# Patient Record
Sex: FEMALE | Race: WHITE | Employment: OTHER | URBAN - METROPOLITAN AREA
[De-identification: names, ages, dates, MRNs, and addresses within clinical notes are randomized per-mention and may not be internally consistent; named-entity substitution may affect disease eponyms.]

---

## 2017-01-12 RX ORDER — SIMVASTATIN 20 MG/1
20 TABLET, FILM COATED ORAL
Qty: 90 TAB | Refills: 0 | Status: SHIPPED | OUTPATIENT
Start: 2017-01-12 | End: 2017-04-09 | Stop reason: SDUPTHER

## 2017-01-12 NOTE — TELEPHONE ENCOUNTER
From: Pat Echeverria  To: Maria Isabel Murray MD  Sent: 1/11/2017 7:56 PM EST  Subject: Medication Renewal Request    Original authorizing provider: MD Pat Cunningham would like a refill of the following medications:  simvastatin (ZOCOR) 20 mg tablet Maria Isabel Murray MD]    Preferred pharmacy: [facility]/PHARMACY #[id number] [place] [address] [address]:

## 2017-01-25 DIAGNOSIS — E11.21 TYPE II DIABETES MELLITUS WITH NEPHROPATHY (HCC): ICD-10-CM

## 2017-01-26 RX ORDER — LEVOTHYROXINE SODIUM 25 UG/1
TABLET ORAL
Qty: 90 TAB | Refills: 0 | Status: SHIPPED | OUTPATIENT
Start: 2017-01-26 | End: 2017-05-14 | Stop reason: SDUPTHER

## 2017-01-26 RX ORDER — LOSARTAN POTASSIUM 25 MG/1
TABLET ORAL
Qty: 90 TAB | Refills: 0 | Status: SHIPPED | OUTPATIENT
Start: 2017-01-26 | End: 2017-05-01 | Stop reason: SDUPTHER

## 2017-03-02 RX ORDER — LOSARTAN POTASSIUM AND HYDROCHLOROTHIAZIDE 12.5; 5 MG/1; MG/1
1 TABLET ORAL 2 TIMES DAILY
Qty: 180 TAB | Refills: 0 | Status: SHIPPED | OUTPATIENT
Start: 2017-03-02 | End: 2017-05-29 | Stop reason: SDUPTHER

## 2017-03-02 NOTE — TELEPHONE ENCOUNTER
From: Divina Canales  To: Zeferino Chowdhury MD  Sent: 3/2/2017 8:33 AM EST  Subject: Medication Renewal Request    Original authorizing provider: MD Divina Martinez would like a refill of the following medications:  losartan-hydrochlorothiazide (HYZAAR) 50-12.5 mg per tablet Zeferino Chowdhury MD]    Preferred pharmacy: [facility]/PHARMACY #[id number]:

## 2017-03-22 RX ORDER — SERTRALINE HYDROCHLORIDE 100 MG/1
TABLET, FILM COATED ORAL
Qty: 90 TAB | Refills: 3 | Status: SHIPPED | OUTPATIENT
Start: 2017-03-22 | End: 2018-03-18 | Stop reason: SDUPTHER

## 2017-04-06 ENCOUNTER — HOSPITAL ENCOUNTER (OUTPATIENT)
Dept: LAB | Age: 72
Discharge: HOME | End: 2017-04-06
Payer: MEDICARE

## 2017-04-06 ENCOUNTER — OFFICE VISIT (OUTPATIENT)
Dept: INTERNAL MEDICINE CLINIC | Age: 72
End: 2017-04-06

## 2017-04-06 VITALS
DIASTOLIC BLOOD PRESSURE: 78 MMHG | HEIGHT: 62 IN | HEART RATE: 67 BPM | OXYGEN SATURATION: 96 % | WEIGHT: 205 LBS | TEMPERATURE: 97.8 F | BODY MASS INDEX: 37.73 KG/M2 | RESPIRATION RATE: 20 BRPM | SYSTOLIC BLOOD PRESSURE: 119 MMHG

## 2017-04-06 DIAGNOSIS — Z79.4 TYPE 2 DIABETES MELLITUS WITHOUT COMPLICATION, WITH LONG-TERM CURRENT USE OF INSULIN (HCC): ICD-10-CM

## 2017-04-06 DIAGNOSIS — I10 ESSENTIAL HYPERTENSION: Primary | ICD-10-CM

## 2017-04-06 DIAGNOSIS — I25.10 CORONARY ARTERY DISEASE DUE TO CALCIFIED CORONARY LESION: ICD-10-CM

## 2017-04-06 DIAGNOSIS — D72.825 BANDEMIA: ICD-10-CM

## 2017-04-06 DIAGNOSIS — E11.9 TYPE 2 DIABETES MELLITUS WITHOUT COMPLICATION, WITH LONG-TERM CURRENT USE OF INSULIN (HCC): ICD-10-CM

## 2017-04-06 DIAGNOSIS — E55.9 VITAMIN D DEFICIENCY: ICD-10-CM

## 2017-04-06 DIAGNOSIS — Z11.59 NEED FOR HEPATITIS C SCREENING TEST: ICD-10-CM

## 2017-04-06 DIAGNOSIS — I25.84 CORONARY ARTERY DISEASE DUE TO CALCIFIED CORONARY LESION: ICD-10-CM

## 2017-04-06 DIAGNOSIS — R60.0 LOCALIZED EDEMA: ICD-10-CM

## 2017-04-06 DIAGNOSIS — E03.4 HYPOTHYROIDISM DUE TO ACQUIRED ATROPHY OF THYROID: ICD-10-CM

## 2017-04-06 PROCEDURE — 80053 COMPREHEN METABOLIC PANEL: CPT

## 2017-04-06 PROCEDURE — 86803 HEPATITIS C AB TEST: CPT

## 2017-04-06 PROCEDURE — 84443 ASSAY THYROID STIM HORMONE: CPT

## 2017-04-06 PROCEDURE — 82043 UR ALBUMIN QUANTITATIVE: CPT

## 2017-04-06 PROCEDURE — 82306 VITAMIN D 25 HYDROXY: CPT

## 2017-04-06 PROCEDURE — 80061 LIPID PANEL: CPT

## 2017-04-06 PROCEDURE — 36415 COLL VENOUS BLD VENIPUNCTURE: CPT

## 2017-04-06 PROCEDURE — 83735 ASSAY OF MAGNESIUM: CPT

## 2017-04-06 NOTE — PROGRESS NOTES
Matthias Springer is a 70 y.o. female and presents with Leg Swelling and Labs  . Edema  Patient complains of edema. The location of the edema is lower leg(s) bilateral.  The edema has been mild. Onset of symptoms was 1 day ago, unchanged since that time. The edema is present on awakening. The patient states the problem is intermittent for 3 months. The swelling has been aggravated by dependency of involved area, hot weather, relieved by diuretics, and been associated with nothing. Cardiac risk factors include dyslipidemia, diabetes mellitus, obesity, sedentary life style, hypertension, post-menopausal.    cad  She was seen by Dr. Marc hamilton. An ecg was done as well as peripheral dopplers. Both were negative for cv findings. She will follow up in 3 months. Diabetes  BS have been increasing  Saw Dr. Beverly & Arnold who is managing care  a1c is stable 7.3 but no lows so will continue current care  No se of meds    Subjective:   Matthias Springer is a 70 y.o. female with hypertension. Hypertension ROS: taking medications as instructed, no medication side effects noted, no TIA's, no chest pain on exertion, no dyspnea on exertion, no swelling of ankles. New concerns:  but did not wear compression hose today    Sleep apnea  Saw Dr. Maria Elena Santo, pt is on CPAP machine  She had pressure changed but now has settled into new change  Sleeping 6-7 hours/night now     SUBJECTIVE: Matthias Springer is a 70 y.o. female here for follow up of hypothyroidism. Lab Results   Component Value Date/Time    TSH 2.290 04/27/2016 09:25 AM     Thyroid ROS: denies fatigue, weight changes, heat/cold intolerance, bowel/skin changes or CVS symptoms. Nephropathy reviewed with pt re: labs  Review of Systems  Constitutional: negative for fevers, chills, anorexia and weight loss  Eyes:   negative for visual disturbance and irritation  ENT:   negative for tinnitus,sore throat,nasal congestion,ear pains. hoarseness  Respiratory:  negative for cough, hemoptysis, dyspnea,wheezing  CV:   negative for chest pain, palpitations, lower extremity edema  GI:   negative for nausea, vomiting, diarrhea, abdominal pain,melena  Endo:               negative for polyuria,polydipsia,polyphagia,heat intolerance  Genitourinary: negative for frequency, dysuria and hematuria  Integument:  negative for rash and pruritus  Hematologic:  negative for easy bruising and gum/nose bleeding  Musculoskel: negative for myalgias, arthralgias, back pain, muscle weakness, joint pain  Neurological:  negative for headaches, dizziness, vertigo, memory problems and gait   Behavl/Psych: negative for feelings of anxiety, depression, mood changes    Past Medical History:   Diagnosis Date    Arthritis     CAD (coronary artery disease) 2006    STENT PLACED>PLAQUE BROKE OFF--HAD \"MINOR MI\"    Deviated septum 13    HAS TROUBLE BREATHING THROUGH LEFT SIDE; CT SHOWED POLYP    Diabetes ([facility] [address]     Heart disease     Hypertension     Incontinence     Nasal polyp     Unspecified sleep apnea 13    CAN'T TOLERATE CPAP     Past Surgical History:   Procedure Laterality Date    CARDIAC SURG PROCEDURE UNLIST      CARDIAC CATH;ANGIOPLASTY WITH STENT    HX  SECTION      X3    HX COLONOSCOPY  2011    HX HEENT  ?     SEPTOPLASTY     Social History     Social History    Marital status:      Spouse name: N/A    Number of children: N/A    Years of education: N/A     Social History Main Topics    Smoking status: Former Smoker     Packs/day: 1.00     Years: 5.00     Quit date: 1970    Smokeless tobacco: None    Alcohol use Yes      Comment: \"VERY LITTLE ALCOHOL\"    Drug use: No    Sexual activity: Not Asked     Other Topics Concern    None     Social History Narrative            3 children women 36, 40, 37 healthy other than skin issues     Family History   Problem Relation Age of Onset    Heart Disease Mother     Heart Surgery Mother      VALVE REPLACED AT AGE 72    Heart Attack Father 46     MASSIVE     Anesth Problems Neg Hx      Current Outpatient Prescriptions   Medication Sig Dispense Refill    sertraline (ZOLOFT) 100 mg tablet TAKE 1 TAB BY MOUTH EVERY EVENING. 90 Tab 3    losartan-hydroCHLOROthiazide (HYZAAR) 50-12.5 mg per tablet Take 1 Tab by mouth two (2) times a day. 180 Tab 0    losartan (COZAAR) 25 mg tablet TAKE 1 TABLET BY MOUTH EVERY DAY 90 Tab 0    levothyroxine (SYNTHROID) 25 mcg tablet TAKE 1 TABLET BY MOUTH EVERY DAY BEFORE BREAKFAST 90 Tab 0    simvastatin (ZOCOR) 20 mg tablet Take 1 Tab by mouth nightly. 90 Tab 0    metoprolol tartrate (LOPRESSOR) 50 mg tablet Take 1 Tab by mouth daily. 90 Tab 1    Blood Sugar Diagnostic, Drum (ACCU-CHEK COMPACT PLUS TEST) strp Use to test blood sugar 4 times daily. DX E11.9 360 Strip 6    insulin glargine (LANTUS) 100 unit/mL injection 20 Units by SubCUTAneous route two (2) times a day. 1 Vial 3    HUMALOG KWIKPEN 100 unit/mL kwikpen   3    Cholecalciferol, Vitamin D3, (VITAMIN D3) 1,000 unit cap Take 1 Cap by mouth nightly. 30 Cap 3    glimepiride (AMARYL) 4 mg tablet Take  by mouth every morning.  Aspirin, Buffered 81 mg tab Take  by mouth daily.  metFORMIN (GLUCOPHAGE) 1,000 mg tablet Take 1,000 mg by mouth two (2) times a day.  omega-3 acid ethyl esters (LOVAZA) 1 gram capsule Take 2 g by mouth two (2) times a day.  Cetirizine (ZYRTEC) 10 mg cap Take  by mouth daily.  mometasone (NASONEX) 50 mcg/actuation nasal spray 2 Sprays by Both Nostrils route daily.        Allergies   Allergen Reactions    Adhesive Rash and Itching    Advil [Ibuprofen] Swelling     FEET SWELL    Ceftin [Cefuroxime Axetil] Itching    Lotensin [Benazepril] Cough    Penicillins Swelling    Sulfa (Sulfonamide Antibiotics) Rash, Itching and Swelling       Objective:  Visit Vitals    /78 (BP 1 Location: Left arm, BP Patient Position: Sitting)    Pulse 67    Temp 97.8 °F (36.6 °C) (Oral)    Resp 20    Ht 5' 2\" (1.575 m)    Wt 205 lb (93 kg)    SpO2 96%    BMI 37.49 kg/m2     Physical Exam:   General appearance - alert, well appearing, and in no distress  Mental status - alert, oriented to person, place, and time  EYE-SHARAD, EOMI, corneas normal, no foreign bodies, visual acuity normal both eyes, no periorbital cellulitis  ENT-ENT exam normal, no neck nodes or sinus tenderness  Nose - normal and patent, no erythema, discharge or polyps  Mouth - mucous membranes moist, pharynx normal without lesions  Neck - supple, no significant adenopathy   Chest - clear to auscultation, no wheezes, rales or rhonchi, symmetric air entry   Heart - normal rate, regular rhythm, normal S1, S2, no murmurs, rubs, clicks or gallops   Abdomen - soft, nontender, nondistended, no masses or organomegaly  Lymph- no adenopathy palpable  Ext-peripheral pulses normal, no pedal edema, no clubbing or cyanosis, + 1 edema  Skin-Warm and dry. no hyperpigmentation, vitiligo, or suspicious lesions, scattered resolved superifical skin bites, no erythema  Neuro -alert, oriented, normal speech, no focal findings or movement disorder noted  Neck-normal C-spine, no tenderness, full ROM without pain      Results for orders placed or performed in visit on 07/27/16   CBC WITH AUTOMATED DIFF   Result Value Ref Range    WBC 11.7 (H) 3.4 - 10.8 x10E3/uL    RBC 4.33 3.77 - 5.28 x10E6/uL    HGB 13.2 11.1 - 15.9 g/dL    HCT 38.7 34.0 - 46.6 %    MCV 89 79 - 97 fL    MCH 30.5 26.6 - 33.0 pg    MCHC 34.1 31.5 - 35.7 g/dL    RDW 14.0 12.3 - 15.4 %    PLATELET 548 978 - 569 x10E3/uL    NEUTROPHILS 63 %    Lymphocytes 26 %    MONOCYTES 6 %    EOSINOPHILS 4 %    BASOPHILS 1 %    ABS. NEUTROPHILS 7.3 (H) 1.4 - 7.0 x10E3/uL    Abs Lymphocytes 3.0 0.7 - 3.1 x10E3/uL    ABS. MONOCYTES 0.7 0.1 - 0.9 x10E3/uL    ABS. EOSINOPHILS 0.5 (H) 0.0 - 0.4 x10E3/uL    ABS. BASOPHILS 0.1 0.0 - 0.2 x10E3/uL    IMMATURE GRANULOCYTES 0 %    ABS. IMM.  GRANS. 0.0 0.0 - 0.1 [id number]/FA   METABOLIC PANEL, COMPREHENSIVE   Result Value Ref Range    Glucose 114 (H) 65 - 99 mg/dL    BUN 20 8 - 27 mg/dL    Creatinine 0.83 0.57 - 1.00 mg/dL    GFR est non-AA 71 >59 mL/min/1.73    GFR est AA 82 >59 mL/min/1.73    BUN/Creatinine ratio 24 11 - 26    Sodium 140 134 - 144 mmol/L    Potassium 4.4 3.5 - 5.2 mmol/L    Chloride 100 97 - 108 mmol/L    CO2 20 18 - 29 mmol/L    Calcium 9.7 8.7 - 10.3 mg/dL    Protein, total 6.8 6.0 - 8.5 g/dL    Albumin 4.4 3.5 - 4.8 g/dL    GLOBULIN, TOTAL 2.4 1.5 - 4.5 g/dL    A-G Ratio 1.8 1.1 - 2.5    Bilirubin, total 0.4 0.0 - 1.2 mg/dL    Alk. phosphatase 96 39 - 117 IU/L    AST (SGOT) 23 0 - 40 IU/L    ALT (SGPT) 27 0 - 32 IU/L   MICROALBUMIN, UR, RAND W/ MICROALBUMIN/CREA RATIO   Result Value Ref Range    Creatinine, urine 81.9 Not Estab. mg/dL    Microalbumin, urine 91.2 Not Estab. ug/mL    Microalb/Creat ratio (ug/mg creat.) 111.4 (H) 0.0 - 30.0 mg/g creat     Prevention    Cardiovascular profile  Family hx  Exercising:  Water aerobics at [employer]  Blood pressure:  Health healthy diet:  Diabetes:  Cholesterol:  Renal function:      Cancer risk profile  Mammogram ordered at [address] [place] [address]  Colonoscopy 2013 at [facility], was told did not need repeat unless sx  Skin nonhealing in 2 weeks  Gyn abnormal bleeding/discharge/abd pain/pressure no issues, no symptoms      Thyroid sx  Fatigue, sleep apnea    Osteopenia prevention  Calcium 1000mg/day yes  Vitamin D 800iu/day yes    Mental health scale: 8/10  zoloft correct dose  Depression  Anxiety  Sleep # of hours:  Energy Level:        Immunizations  TDAP  Pneumonia vaccine  Flu vaccine  Shingles vaccine  HPV    Manuel Flatness was seen today for leg swelling and labs.     Diagnoses and all orders for this visit:    Need for hepatitis C screening test  -     HEPATITIS C AB    Type 2 diabetes mellitus without complication, with long-term current use of insulin (HCC)  Cont labs  -     LIPID PANEL  -     MICROALBUMIN, UR, RAND W/ MICROALBUMIN/CREA RATIO    Essential hypertension  Stable  Added hctz last visit due to edema, check labs  -     LIPID PANEL  -     MICROALBUMIN, UR, RAND W/ MICROALBUMIN/CREA RATIO  -     MAGNESIUM    Localized edema  stable  -     MAGNESIUM  -     AMB SUPPLY ORDER    Coronary artery disease due to calcified coronary lesion  -     METABOLIC PANEL, COMPREHENSIVE    Hypothyroidism due to acquired atrophy of thyroid  -     TSH 3RD GENERATION    Bandemia  Follow up with hematology    Vitamin D deficiency  -     VITAMIN D, 25 HYDROXY        Edema  Use compression moderate strength (new Pair)  ini emely increase hydrochlorothiazide, labs today mag and losartan cont    Diabetes  Leo follow up  Foot exam done today  Opthalmology dr. Bryce Baker cataract 12/216        This note will not be viewable in MyChart.

## 2017-04-06 NOTE — MR AVS SNAPSHOT
Visit Information Date & Time Provider Department Dept. Phone Encounter #  
 4/6/2017  8:15 AM Anna Marie Garcia MD [facility] [address] [id number] Your Appointments 7/17/2017  9:00 AM  
Any with Sharyn Smalls MD  
[address] [facility] ([facility]) Appt Note: Yearly F/U, bring machine. [address] [address] [facility]  
  
   
 [address] [address] Upcoming Health Maintenance Date Due Hepatitis C Screening 1945 FOOT EXAM Q1 6/20/1955 EYE EXAM RETINAL OR DILATED Q1 6/20/1955 BREAST CANCER SCRN MAMMOGRAM 6/20/1995 FOBT Q 1 YEAR AGE 50-75 6/20/1995 GLAUCOMA SCREENING Q2Y 6/20/2010 OSTEOPOROSIS SCREENING (DEXA) 6/20/2010 MEDICARE YEARLY EXAM 6/20/2010 Pneumococcal 65+ High/Highest Risk (2 of 2 - PCV13) 11/30/2011 INFLUENZA AGE 9 TO ADULT 8/1/2016 HEMOGLOBIN A1C Q6M 10/27/2016 MICROALBUMIN Q1 11/4/2017 LIPID PANEL Q1 11/4/2017 DTaP/Tdap/Td series (2 - Td) 1/15/2025 Allergies as of 4/6/2017  Review Complete On: 4/6/2017 By: Anna Marie Garcia MD  
  
 Severity Noted Reaction Type Reactions Adhesive  12/20/2013    Rash, Itching Advil [Ibuprofen]  12/20/2013    Swelling FEET SWELL Ceftin [Cefuroxime Axetil]  12/20/2013    Itching Lotensin [Benazepril]  12/20/2013    Cough Penicillins  12/20/2013    Swelling Sulfa (Sulfonamide Antibiotics)  12/20/2013    Rash, Itching, Swelling Current Immunizations  Never Reviewed Name Date Influenza High Dose Vaccine PF 9/22/2015 Pneumococcal Polysaccharide (PPSV-23) 11/30/2010 Tdap 1/15/2015 Zoster Vaccine, Live 7/27/2011 Not reviewed this visit You Were Diagnosed With   
  
 Codes Comments  Need for hepatitis C screening test    -  Primary ICD-10-CM: Z11.59 
ICD-9-CM: V73.89   
 Type 2 diabetes mellitus without complication, with long-term current use of insulin (HCC)     ICD-10-CM: E11.9, Z79.4 ICD-9-CM: 250.00, V58.67 Essential hypertension     ICD-10-CM: I10 
ICD-9-CM: 401.9 Localized edema     ICD-10-CM: R60.0 ICD-9-CM: 458. 3 Coronary artery disease due to calcified coronary lesion     ICD-10-CM: I25.10, I25.84 ICD-9-CM: 414.00, 414.4 Hypothyroidism due to acquired atrophy of thyroid     ICD-10-CM: E03.4 ICD-9-CM: 244.8, 246.8 Bandemia     ICD-10-CM: O37.479 ICD-9-CM: 420.33 Vitamin D deficiency     ICD-10-CM: E55.9 ICD-9-CM: 268.9 Vitals BP Pulse Temp Resp Height(growth percentile) Weight(growth percentile) 119/78 (BP 1 Location: Left arm, BP Patient Position: Sitting) 67 97.8 °F (36.6 °C) (Oral) 20 5' 2\" (1.575 m) 205 lb (93 kg) SpO2 BMI OB Status Smoking Status 96% 37.49 kg/m2 Postmenopausal Former Smoker Vitals History BMI and BSA Data Body Mass Index Body Surface Area  
 37.49 kg/m 2 2.02 m 2 Preferred Pharmacy Pharmacy Name Phone [facility]/PHARMACY #[id number]- [place], [address] [facility] of [phone] Your Updated Medication List  
  
   
This list is accurate as of: 4/6/17  9:04 AM.  Always use your most recent med list.  
  
  
  
  
 aspirin, buffered 81 mg Tab Take  by mouth daily. Blood Sugar Diagnostic, Drum Strp Commonly known as:  ACCU-CHEK COMPACT PLUS TEST Use to test blood sugar 4 times daily. DX E11.9 cholecalciferol 1,000 unit Cap Commonly known as:  VITAMIN D3 Take 1 Cap by mouth nightly. glimepiride 4 mg tablet Commonly known as:  AMARYL Take  by mouth every morning. HumaLOG KwikPen 100 unit/mL kwikpen Generic drug:  insulin lispro  
  
 insulin glargine 100 unit/mL injection Commonly known as:  LANTUS  
20 Units by SubCUTAneous route two (2) times a day. levothyroxine 25 mcg tablet Commonly known as:  SYNTHROID  
TAKE 1 TABLET BY MOUTH EVERY DAY BEFORE BREAKFAST  
  
 losartan 25 mg tablet Commonly known as:  COZAAR  
TAKE 1 TABLET BY MOUTH EVERY DAY  
  
 losartan-hydroCHLOROthiazide 50-12.5 mg per tablet Commonly known as:  HYZAAR Take 1 Tab by mouth two (2) times a day. LOVAZA 1 gram capsule Generic drug:  omega-3 acid ethyl esters Take 2 g by mouth two (2) times a day. metFORMIN 1,000 mg tablet Commonly known as:  GLUCOPHAGE Take 1,000 mg by mouth two (2) times a day. metoprolol tartrate 50 mg tablet Commonly known as:  LOPRESSOR Take 1 Tab by mouth daily. NASONEX 50 mcg/actuation nasal spray Generic drug:  mometasone 2 Sprays by Both Nostrils route daily. sertraline 100 mg tablet Commonly known as:  ZOLOFT  
TAKE 1 TAB BY MOUTH EVERY EVENING. simvastatin 20 mg tablet Commonly known as:  ZOCOR Take 1 Tab by mouth nightly. ZyrTEC 10 mg Cap Generic drug:  Cetirizine Take  by mouth daily. We Performed the Following AMB SUPPLY ORDER [[id number] Custom] Comments: Moderate strength 15-20 mmHg knee highs. Please show pt how to use compression hose sock nathen. HEPATITIS C AB [33890 CPT(R)] LIPID PANEL [28369 CPT(R)] MAGNESIUM [id number] CPT(R)] METABOLIC PANEL, COMPREHENSIVE [39418 CPT(R)] MICROALBUMIN, UR, RAND W/ MICROALBUMIN/CREA RATIO [id number] CPT(R)] TSH 3RD GENERATION [08228 CPT(R)] VITAMIN D, 25 HYDROXY [id number] CPT(R)] Introducing [facility]! Dear Lucila Ludwig: Thank you for requesting a [employer] account. Our records indicate that you already have an active [employer] account. You can access your account anytime at [email or web address] Did you know that you can access your hospital and ER discharge instructions at any time in [employer]? You can also review all of your test results from your hospital stay or ER visit. Additional Information If you have questions, please visit the Frequently Asked Questions section of the [employer] website at [email or web address] Remember, [employer] is NOT to be used for urgent needs. For medical emergencies, dial 911. Now available from your iPhone and Android! Please provide this summary of care documentation to your next provider. Your primary care clinician is listed as Jennifer Norris. If you have any questions after today's visit, please call [phone].

## 2017-04-07 LAB
25(OH)D3+25(OH)D2 SERPL-MCNC: 41.1 NG/ML (ref 30–100)
ALBUMIN SERPL-MCNC: 4.4 G/DL (ref 3.5–4.8)
ALBUMIN/CREAT UR: 161.6 MG/G CREAT (ref 0–30)
ALBUMIN/GLOB SERPL: 1.8 {RATIO} (ref 1.2–2.2)
ALP SERPL-CCNC: 92 IU/L (ref 39–117)
ALT SERPL-CCNC: 27 IU/L (ref 0–32)
AST SERPL-CCNC: 24 IU/L (ref 0–40)
BILIRUB SERPL-MCNC: 0.4 MG/DL (ref 0–1.2)
BUN SERPL-MCNC: 23 MG/DL (ref 8–27)
BUN/CREAT SERPL: 29 (ref 12–28)
CALCIUM SERPL-MCNC: 9.5 MG/DL (ref 8.7–10.3)
CHLORIDE SERPL-SCNC: 99 MMOL/L (ref 96–106)
CHOLEST SERPL-MCNC: 161 MG/DL (ref 100–199)
CO2 SERPL-SCNC: 20 MMOL/L (ref 18–29)
CREAT SERPL-MCNC: 0.79 MG/DL (ref 0.57–1)
CREAT UR-MCNC: 72.2 MG/DL
GLOBULIN SER CALC-MCNC: 2.5 G/DL (ref 1.5–4.5)
GLUCOSE SERPL-MCNC: 105 MG/DL (ref 65–99)
HCV AB S/CO SERPL IA: <0.1 S/CO RATIO (ref 0–0.9)
HDLC SERPL-MCNC: 59 MG/DL
INTERPRETATION, 910389: NORMAL
LDLC SERPL CALC-MCNC: 60 MG/DL (ref 0–99)
Lab: NORMAL
MAGNESIUM SERPL-MCNC: 1.3 MG/DL (ref 1.6–2.3)
MICROALBUMIN UR-MCNC: 116.7 UG/ML
POTASSIUM SERPL-SCNC: 4.1 MMOL/L (ref 3.5–5.2)
PROT SERPL-MCNC: 6.9 G/DL (ref 6–8.5)
SODIUM SERPL-SCNC: 139 MMOL/L (ref 134–144)
TRIGL SERPL-MCNC: 211 MG/DL (ref 0–149)
TSH SERPL DL<=0.005 MIU/L-ACNC: 3.72 UIU/ML (ref 0.45–4.5)
VLDLC SERPL CALC-MCNC: 42 MG/DL (ref 5–40)

## 2017-04-08 RX ORDER — LANOLIN ALCOHOL/MO/W.PET/CERES
CREAM (GRAM) TOPICAL
Qty: 40 TAB | Refills: 0 | Status: SHIPPED | OUTPATIENT
Start: 2017-04-08 | End: 2017-05-01 | Stop reason: SDUPTHER

## 2017-04-10 RX ORDER — SIMVASTATIN 20 MG/1
20 TABLET, FILM COATED ORAL
Qty: 90 TAB | Refills: 1 | Status: SHIPPED | OUTPATIENT
Start: 2017-04-10 | End: 2017-10-17 | Stop reason: SDUPTHER

## 2017-04-10 NOTE — TELEPHONE ENCOUNTER
From: Shani Lange  To: Eliezer Ch MD  Sent: 4/9/2017 4:34 PM EDT  Subject: Medication Renewal Request    Original authorizing provider: MD Shani Betancourt would like a refill of the following medications:  simvastatin (ZOCOR) 20 mg tablet Eliezer Ch MD]    Preferred pharmacy: [facility]/PHARMACY #[id number], [facility] [address]:  Please refill my prescription for Simvastatin 20 mg. tablet at [facility] .  Phone @ [phone] Thank you Navi Garrido

## 2017-05-01 DIAGNOSIS — E11.21 TYPE II DIABETES MELLITUS WITH NEPHROPATHY (HCC): ICD-10-CM

## 2017-05-02 RX ORDER — LANOLIN ALCOHOL/MO/W.PET/CERES
CREAM (GRAM) TOPICAL
Qty: 40 TAB | Refills: 0 | Status: SHIPPED | OUTPATIENT
Start: 2017-05-02 | End: 2017-05-31 | Stop reason: SDUPTHER

## 2017-05-02 RX ORDER — LOSARTAN POTASSIUM 25 MG/1
TABLET ORAL
Qty: 90 TAB | Refills: 0 | Status: SHIPPED | OUTPATIENT
Start: 2017-05-02 | End: 2017-08-07 | Stop reason: SDUPTHER

## 2017-05-09 DIAGNOSIS — E11.21 TYPE II DIABETES MELLITUS WITH NEPHROPATHY (HCC): ICD-10-CM

## 2017-05-10 RX ORDER — METOPROLOL TARTRATE 50 MG/1
TABLET ORAL
Qty: 90 TAB | Refills: 1 | Status: SHIPPED | OUTPATIENT
Start: 2017-05-10 | End: 2017-11-10 | Stop reason: SDUPTHER

## 2017-05-10 RX ORDER — LOSARTAN POTASSIUM 25 MG/1
TABLET ORAL
Qty: 90 TAB | Refills: 0 | Status: SHIPPED | OUTPATIENT
Start: 2017-05-10 | End: 2017-06-23 | Stop reason: SDUPTHER

## 2017-05-16 RX ORDER — LEVOTHYROXINE SODIUM 25 UG/1
TABLET ORAL
Qty: 90 TAB | Refills: 0 | Status: SHIPPED | OUTPATIENT
Start: 2017-05-16 | End: 2017-08-10 | Stop reason: SDUPTHER

## 2017-05-30 RX ORDER — LOSARTAN POTASSIUM AND HYDROCHLOROTHIAZIDE 12.5; 5 MG/1; MG/1
TABLET ORAL
Qty: 180 TAB | Refills: 0 | Status: SHIPPED | OUTPATIENT
Start: 2017-05-30 | End: 2017-08-24 | Stop reason: SDUPTHER

## 2017-06-01 RX ORDER — LANOLIN ALCOHOL/MO/W.PET/CERES
400 CREAM (GRAM) TOPICAL DAILY
Qty: 30 TAB | Refills: 0 | Status: SHIPPED | OUTPATIENT
Start: 2017-06-01 | End: 2017-07-03 | Stop reason: SDUPTHER

## 2017-06-18 ENCOUNTER — HOSPITAL ENCOUNTER (EMERGENCY)
Age: 72
Discharge: HOME | End: 2017-06-18
Attending: STUDENT IN AN ORGANIZED HEALTH CARE EDUCATION/TRAINING PROGRAM
Payer: MEDICARE

## 2017-06-18 ENCOUNTER — APPOINTMENT (OUTPATIENT)
Dept: GENERAL RADIOLOGY | Age: 72
End: 2017-06-18
Attending: EMERGENCY MEDICINE
Payer: MEDICARE

## 2017-06-18 VITALS
WEIGHT: 198.6 LBS | OXYGEN SATURATION: 91 % | RESPIRATION RATE: 17 BRPM | SYSTOLIC BLOOD PRESSURE: 166 MMHG | TEMPERATURE: 99.1 F | DIASTOLIC BLOOD PRESSURE: 83 MMHG | HEIGHT: 62 IN | BODY MASS INDEX: 36.55 KG/M2 | HEART RATE: 70 BPM

## 2017-06-18 DIAGNOSIS — R11.2 NAUSEA AND VOMITING, INTRACTABILITY OF VOMITING NOT SPECIFIED, UNSPECIFIED VOMITING TYPE: Primary | ICD-10-CM

## 2017-06-18 DIAGNOSIS — R19.7 DIARRHEA, UNSPECIFIED TYPE: ICD-10-CM

## 2017-06-18 DIAGNOSIS — E86.0 DEHYDRATION: ICD-10-CM

## 2017-06-18 LAB
ALBUMIN SERPL BCP-MCNC: 3.9 G/DL (ref 3.5–5)
ALBUMIN/GLOB SERPL: 1 {RATIO} (ref 1.1–2.2)
ALP SERPL-CCNC: 93 U/L (ref 45–117)
ALT SERPL-CCNC: 40 U/L (ref 12–78)
ANION GAP BLD CALC-SCNC: 10 MMOL/L (ref 5–15)
APPEARANCE UR: CLEAR
AST SERPL W P-5'-P-CCNC: 27 U/L (ref 15–37)
BACTERIA URNS QL MICRO: NEGATIVE /HPF
BASOPHILS # BLD AUTO: 0 K/UL (ref 0–0.1)
BASOPHILS # BLD: 0 % (ref 0–1)
BILIRUB SERPL-MCNC: 0.6 MG/DL (ref 0.2–1)
BILIRUB UR QL: NEGATIVE
BUN SERPL-MCNC: 22 MG/DL (ref 6–20)
BUN/CREAT SERPL: 20 (ref 12–20)
CALCIUM SERPL-MCNC: 9.3 MG/DL (ref 8.5–10.1)
CHLORIDE SERPL-SCNC: 103 MMOL/L (ref 97–108)
CO2 SERPL-SCNC: 25 MMOL/L (ref 21–32)
COLOR UR: ABNORMAL
CREAT SERPL-MCNC: 1.1 MG/DL (ref 0.55–1.02)
DIFFERENTIAL METHOD BLD: NORMAL
EOSINOPHIL # BLD: 0 K/UL (ref 0–0.4)
EOSINOPHIL NFR BLD: 0 % (ref 0–7)
EPITH CASTS URNS QL MICRO: ABNORMAL /LPF
ERYTHROCYTE [DISTWIDTH] IN BLOOD BY AUTOMATED COUNT: 13.2 % (ref 11.5–14.5)
GLOBULIN SER CALC-MCNC: 4.1 G/DL (ref 2–4)
GLUCOSE SERPL-MCNC: 115 MG/DL (ref 65–100)
GLUCOSE UR STRIP.AUTO-MCNC: NEGATIVE MG/DL
HCT VFR BLD AUTO: 39.2 % (ref 35–47)
HGB BLD-MCNC: 13.7 G/DL (ref 11.5–16)
HGB UR QL STRIP: ABNORMAL
HYALINE CASTS URNS QL MICRO: ABNORMAL /LPF (ref 0–5)
KETONES UR QL STRIP.AUTO: NEGATIVE MG/DL
LEUKOCYTE ESTERASE UR QL STRIP.AUTO: NEGATIVE
LIPASE SERPL-CCNC: 160 U/L (ref 73–393)
LYMPHOCYTES # BLD AUTO: 28 % (ref 12–49)
LYMPHOCYTES # BLD: 3.1 K/UL (ref 0.8–3.5)
MCH RBC QN AUTO: 30.2 PG (ref 26–34)
MCHC RBC AUTO-ENTMCNC: 34.9 G/DL (ref 30–36.5)
MCV RBC AUTO: 86.5 FL (ref 80–99)
MONOCYTES # BLD: 0.9 K/UL (ref 0–1)
MONOCYTES NFR BLD AUTO: 8 % (ref 5–13)
NEUTS SEG # BLD: 6.9 K/UL (ref 1.8–8)
NEUTS SEG NFR BLD AUTO: 64 % (ref 32–75)
NITRITE UR QL STRIP.AUTO: NEGATIVE
PH UR STRIP: 6 [PH] (ref 5–8)
PLATELET # BLD AUTO: 252 K/UL (ref 150–400)
POTASSIUM SERPL-SCNC: 3.6 MMOL/L (ref 3.5–5.1)
PROT SERPL-MCNC: 8 G/DL (ref 6.4–8.2)
PROT UR STRIP-MCNC: 300 MG/DL
RBC # BLD AUTO: 4.53 M/UL (ref 3.8–5.2)
RBC #/AREA URNS HPF: ABNORMAL /HPF (ref 0–5)
RBC MORPH BLD: NORMAL
SODIUM SERPL-SCNC: 138 MMOL/L (ref 136–145)
SP GR UR REFRACTOMETRY: 1.02 (ref 1–1.03)
UROBILINOGEN UR QL STRIP.AUTO: 0.2 EU/DL (ref 0.2–1)
WBC # BLD AUTO: 10.9 K/UL (ref 3.6–11)
WBC URNS QL MICRO: ABNORMAL /HPF (ref 0–4)

## 2017-06-18 PROCEDURE — 80053 COMPREHEN METABOLIC PANEL: CPT | Performed by: EMERGENCY MEDICINE

## 2017-06-18 PROCEDURE — 83690 ASSAY OF LIPASE: CPT | Performed by: EMERGENCY MEDICINE

## 2017-06-18 PROCEDURE — 96375 TX/PRO/DX INJ NEW DRUG ADDON: CPT

## 2017-06-18 PROCEDURE — 36415 COLL VENOUS BLD VENIPUNCTURE: CPT | Performed by: EMERGENCY MEDICINE

## 2017-06-18 PROCEDURE — 96376 TX/PRO/DX INJ SAME DRUG ADON: CPT

## 2017-06-18 PROCEDURE — 74020 XR ABD FLAT/ ERECT: CPT

## 2017-06-18 PROCEDURE — 99285 EMERGENCY DEPT VISIT HI MDM: CPT

## 2017-06-18 PROCEDURE — 85025 COMPLETE CBC W/AUTO DIFF WBC: CPT | Performed by: EMERGENCY MEDICINE

## 2017-06-18 PROCEDURE — 74011250636 HC RX REV CODE- 250/636: Performed by: EMERGENCY MEDICINE

## 2017-06-18 PROCEDURE — 74011000250 HC RX REV CODE- 250: Performed by: EMERGENCY MEDICINE

## 2017-06-18 PROCEDURE — 96374 THER/PROPH/DIAG INJ IV PUSH: CPT

## 2017-06-18 PROCEDURE — 96361 HYDRATE IV INFUSION ADD-ON: CPT

## 2017-06-18 PROCEDURE — 81001 URINALYSIS AUTO W/SCOPE: CPT | Performed by: EMERGENCY MEDICINE

## 2017-06-18 RX ORDER — ONDANSETRON 2 MG/ML
4 INJECTION INTRAMUSCULAR; INTRAVENOUS
Status: COMPLETED | OUTPATIENT
Start: 2017-06-18 | End: 2017-06-18

## 2017-06-18 RX ORDER — FAMOTIDINE 10 MG/ML
20 INJECTION INTRAVENOUS
Status: COMPLETED | OUTPATIENT
Start: 2017-06-18 | End: 2017-06-18

## 2017-06-18 RX ORDER — PROMETHAZINE HYDROCHLORIDE 25 MG/1
25 TABLET ORAL
Qty: 12 TAB | Refills: 0 | Status: SHIPPED | OUTPATIENT
Start: 2017-06-18 | End: 2017-07-21 | Stop reason: ALTCHOICE

## 2017-06-18 RX ORDER — ONDANSETRON 4 MG/1
4 TABLET, FILM COATED ORAL
Qty: 12 TAB | Refills: 0 | Status: SHIPPED | OUTPATIENT
Start: 2017-06-18 | End: 2017-07-21 | Stop reason: ALTCHOICE

## 2017-06-18 RX ADMIN — FAMOTIDINE 20 MG: 10 INJECTION, SOLUTION INTRAVENOUS at 14:27

## 2017-06-18 RX ADMIN — ONDANSETRON 4 MG: 2 INJECTION INTRAMUSCULAR; INTRAVENOUS at 14:27

## 2017-06-18 RX ADMIN — ONDANSETRON 4 MG: 2 INJECTION INTRAMUSCULAR; INTRAVENOUS at 15:26

## 2017-06-18 RX ADMIN — SODIUM CHLORIDE 1000 ML: 900 INJECTION, SOLUTION INTRAVENOUS at 14:27

## 2017-06-18 NOTE — ED PROVIDER NOTES
Patient is a 70 y.o. female presenting with diarrhea and vomiting. Diarrhea    Associated symptoms include diarrhea and vomiting. Pertinent negatives include no dysuria, no headaches and no back pain. Vomiting    Associated symptoms include diarrhea. Pertinent negatives include no abdominal pain, no headaches and no headaches. Pt reports abrupt onset of nausea, non bloody vomiting and non bloody diarrhea yesterday morning at home. She states that her symptoms continued throughout the day but slept well at night. She took her morning meds with water and was able to keep them down. [facility] and was sent to the ED for further evaluation. Denies fever, cold symptoms,headache, neck pain, visual changes, focal weakness or rash. Denies any difficulty breathing, difficulty swallowing, SOB, chest pain or abdominal pain. Denies any urinary symptoms. Pt. Reports that she has not had any solid food since yesterday morning. Old charts reviewed. Past Medical History:   Diagnosis Date    Arthritis     CAD (coronary artery disease)     STENT PLACED>PLAQUE BROKE OFF--HAD \"MINOR MI\"    Deviated septum 13    HAS TROUBLE BREATHING THROUGH LEFT SIDE; CT SHOWED POLYP    Diabetes ([address]     Heart disease     Hypertension     Incontinence     Nasal polyp     Unspecified sleep apnea 13    CAN'T TOLERATE CPAP       Past Surgical History:   Procedure Laterality Date    CARDIAC SURG PROCEDURE UNLIST      CARDIAC CATH;ANGIOPLASTY WITH STENT    HX  SECTION      X3    HX COLONOSCOPY  2011    HX HEENT  ?     SEPTOPLASTY         Family History:   Problem Relation Age of Onset    Heart Disease Mother     Heart Surgery Mother      VALVE REPLACED AT AGE 72    Heart Attack Father 46     MASSIVE     Anesth Problems Neg Hx        Social History     Social History    Marital status:      Spouse name: N/A    Number of children: N/A    Years of education: N/A Occupational History    Not on file. Social History Main Topics    Smoking status: Former Smoker     Packs/day: 1.00     Years: 5.00     Quit date: 12/20/1970    Smokeless tobacco: Not on file    Alcohol use Yes      Comment: \"VERY LITTLE ALCOHOL\"    Drug use: No    Sexual activity: Not on file     Other Topics Concern    Not on file     Social History Narrative            3 children women 40, 40, 37 healthy other than skin issues         ALLERGIES: Adhesive; Advil [ibuprofen]; Ceftin [cefuroxime axetil]; Lotensin [benazepril]; Penicillins; and Sulfa (sulfonamide antibiotics)    Review of Systems   Constitutional: Negative for activity change and appetite change. HENT: Negative for facial swelling, sore throat and trouble swallowing. Eyes: Negative. Respiratory: Negative for shortness of breath. Cardiovascular: Negative. Gastrointestinal: Positive for diarrhea and vomiting. Negative for abdominal pain. Genitourinary: Negative for dysuria. Musculoskeletal: Negative for back pain and neck pain. Skin: Negative for color change. Neurological: Negative for headaches. Psychiatric/Behavioral: Negative. Vitals:    06/18/17 1312 06/18/17 1352 06/18/17 1354   BP: 145/70 151/76    Pulse: 75     Resp: 16     Temp: 98.7 °F (37.1 °C)     SpO2: 95%  94%   Weight: 90.1 kg (198 lb 9.6 oz)     Height: 5' 2\" (1.575 m)              Physical Exam   Constitutional: She is oriented to person, place, and time. Obese elderly white female; former smoker;retired;    HENT:   Head: Normocephalic. Right Ear: External ear normal.   Left Ear: External ear normal.   Mouth/Throat: Oropharynx is clear and moist.   Eyes: Pupils are equal, round, and reactive to light. Neck: Normal range of motion. Neck supple. Cardiovascular: Normal rate and regular rhythm. Pulmonary/Chest: Effort normal and breath sounds normal.   Abdominal: Soft.  Bowel sounds are normal. She exhibits no distension and no mass. There is no tenderness. There is no rebound and no guarding. Musculoskeletal: Normal range of motion. She exhibits no edema. Lymphadenopathy:     She has no cervical adenopathy. Neurological: She is alert and oriented to person, place, and time. Skin: Skin is warm and dry. No rash noted. Nursing note and vitals reviewed. [facility]  ED Course       Procedures      Pt has been re-examined and is feeling slightly better. She is tolerating fluids well.  3:42 PM  Patient's results and plan of care has been reviewed with her and her . Patient and/or family have verbally conveyed their understanding and agreement of the patient's signs, symptoms, diagnosis, treatment and prognosis and additionally agree to follow up as recommended or return to the Emergency Room should her condition change prior to follow-up. Discharge instructions have also been provided to the patient with some educational information regarding her diagnosis as well a list of reasons why she would want to return to the ER prior to her follow-up appointment should her condition change. Sushma Myers NP

## 2017-06-18 NOTE — ED TRIAGE NOTES
TRIAGE NOTE: [facility] sent patient here for fluids. Patient reports vomiting and diarrhea yesterday into the night. Denies either today. Patient hasn't been able to eat. Denies abdominal pain. Labs from [facility] in hand.

## 2017-06-18 NOTE — DISCHARGE INSTRUCTIONS
We hope that we have addressed all of your medical concerns. The examination and treatment you received in the Emergency Department were for an emergent problem and were not intended as complete care. It is important that you follow up with your healthcare provider(s) for ongoing care. If your symptoms worsen or do not improve as expected, and you are unable to reach your usual health care provider(s), you should return to the Emergency Department. Today's healthcare is undergoing tremendous change, and patient satisfaction surveys are one of the many tools to assess the quality of medical care. You may receive a survey from the [employer] regarding your experience in the Emergency Department. I hope that your experience has been completely positive, particularly the medical care that I provided. As such, please participate in the survey; anything less than excellent does not meet my expectations or intentions. [facility] and [employer] participate in nationally recognized quality of care measures. If your blood pressure is greater than 120/80, as reported below, we urge that you seek medical care to address the potential of high blood pressure, commonly known as hypertension. Hypertension can be hereditary or can be caused by certain medical conditions, pain, stress, or \"white coat syndrome. \"       Please make an appointment with your health care provider(s) for follow up of your Emergency Department visit. VITALS:   Patient Vitals for the past 8 hrs:   Temp Pulse Resp BP SpO2   06/18/17 1354 - - - - 94 %   06/18/17 1352 - - - 151/76 -   06/18/17 1312 98.7 °F (37.1 °C) 75 16 145/70 95 %          Thank you for allowing us to provide you with medical care today. We realize that you have many choices for your emergency care needs. Please choose us in the future for any continued health care needs.       Shagufta Rodriguez Candie, MARLEN    [address] [facility].   Office: [phone]            Recent Results (from the past 24 hour(s))   CBC WITH AUTOMATED DIFF    Collection Time: 06/18/17  2:22 PM   Result Value Ref Range    WBC 10.9 3.6 - 11.0 K/uL    RBC 4.53 3.80 - 5.20 M/uL    HGB 13.7 11.5 - 16.0 g/dL    HCT 39.2 35.0 - 47.0 %    MCV 86.5 80.0 - 99.0 FL    MCH 30.2 26.0 - 34.0 PG    MCHC 34.9 30.0 - 36.5 g/dL    RDW 13.2 11.5 - 14.5 %    PLATELET 913 631 - 270 K/uL    NEUTROPHILS 64 32 - 75 %    LYMPHOCYTES 28 12 - 49 %    MONOCYTES 8 5 - 13 %    EOSINOPHILS 0 0 - 7 %    BASOPHILS 0 0 - 1 %    ABS. NEUTROPHILS 6.9 1.8 - 8.0 K/UL    ABS. LYMPHOCYTES 3.1 0.8 - 3.5 K/UL    ABS. MONOCYTES 0.9 0.0 - 1.0 K/UL    ABS. EOSINOPHILS 0.0 0.0 - 0.4 K/UL    ABS. BASOPHILS 0.0 0.0 - 0.1 K/UL    DF SMEAR SCANNED      RBC COMMENTS NORMOCYTIC, NORMOCHROMIC     METABOLIC PANEL, COMPREHENSIVE    Collection Time: 06/18/17  2:22 PM   Result Value Ref Range    Sodium 138 136 - 145 mmol/L    Potassium 3.6 3.5 - 5.1 mmol/L    Chloride 103 97 - 108 mmol/L    CO2 25 21 - 32 mmol/L    Anion gap 10 5 - 15 mmol/L    Glucose 115 (H) 65 - 100 mg/dL    BUN 22 (H) 6 - 20 MG/DL    Creatinine 1.10 (H) 0.55 - 1.02 MG/DL    BUN/Creatinine ratio 20 12 - 20      GFR est AA 59 (L) >60 ml/min/1.73m2    GFR est non-AA 49 (L) >60 ml/min/1.73m2    Calcium 9.3 8.5 - 10.1 MG/DL    Bilirubin, total 0.6 0.2 - 1.0 MG/DL    ALT (SGPT) 40 12 - 78 U/L    AST (SGOT) 27 15 - 37 U/L    Alk.  phosphatase 93 45 - 117 U/L    Protein, total 8.0 6.4 - 8.2 g/dL    Albumin 3.9 3.5 - 5.0 g/dL    Globulin 4.1 (H) 2.0 - 4.0 g/dL    A-G Ratio 1.0 (L) 1.1 - 2.2     LIPASE    Collection Time: 06/18/17  2:22 PM   Result Value Ref Range    Lipase 160 73 - 393 U/L   URINALYSIS W/ RFLX MICROSCOPIC    Collection Time: 06/18/17  2:29 PM   Result Value Ref Range    Color YELLOW/STRAW      Appearance CLEAR CLEAR      Specific gravity 1.022 1.003 - 1.030      pH (UA) 6.0 5.0 - 8.0      Protein 300 (A) NEG mg/dL    Glucose NEGATIVE  NEG mg/dL    Ketone NEGATIVE  NEG mg/dL    Bilirubin NEGATIVE  NEG      Blood TRACE (A) NEG      Urobilinogen 0.2 0.2 - 1.0 EU/dL    Nitrites NEGATIVE  NEG      Leukocyte Esterase NEGATIVE  NEG      WBC 0-4 0 - 4 /hpf    RBC 5-10 0 - 5 /hpf    Epithelial cells FEW FEW /lpf    Bacteria NEGATIVE  NEG /hpf    Hyaline cast 0-2 0 - 5 /lpf       Xr Abd Flat/ Erect    Result Date: 6/18/2017  Exam: 2 view abdomen Indication: Vomiting and diarrhea Supine and upright views of the abdomen demonstrate a normal bowel gas pattern. Lung bases are clear. No free air. Osseous structures are unremarkable. Impression: Normal bowel gas pattern. Dehydration: Care Instructions  Your Care Instructions  Dehydration happens when your body loses too much fluid. This might happen when you do not drink enough water or you lose large amounts of fluids from your body because of diarrhea, vomiting, or sweating. Severe dehydration can be life-threatening. Water and minerals called electrolytes help put your body fluids back in balance. Learn the early signs of fluid loss, and drink more fluids to prevent dehydration. Follow-up care is a key part of your treatment and safety. Be sure to make and go to all appointments, and call your doctor if you are having problems. It's also a good idea to know your test results and keep a list of the medicines you take. How can you care for yourself at home? · To prevent dehydration, drink plenty of fluids, enough so that your urine is light yellow or clear like water. Choose water and other caffeine-free clear liquids until you feel better. If you have kidney, heart, or liver disease and have to limit fluids, talk with your doctor before you increase the amount of fluids you drink. · If you do not feel like eating or drinking, try taking small sips of water, sports drinks, or other rehydration drinks.   · Get plenty of rest.  To prevent dehydration  · Add more fluids to your diet and daily routine, unless your doctor has told you not to. · During hot weather, drink more fluids. Drink even more fluids if you exercise a lot. Stay away from drinks with alcohol or caffeine. · Watch for the symptoms of dehydration. These include:  ¨ A dry, sticky mouth. ¨ Dark yellow urine, and not much of it. ¨ Dry and sunken eyes. ¨ Feeling very tired. · Learn what problems can lead to dehydration. These include:  ¨ Diarrhea, fever, and vomiting. ¨ Any illness with a fever, such as pneumonia or the flu. ¨ Activities that cause heavy sweating, such as endurance races and heavy outdoor work in hot or humid weather. ¨ Alcohol or drug abuse or withdrawal.  ¨ Certain medicines, such as cold and allergy pills (antihistamines), diet pills (diuretics), and laxatives. ¨ Certain diseases, such as diabetes, cancer, and heart or kidney disease. When should you call for help? Call 911 anytime you think you may need emergency care. For example, call if:  · You passed out (lost consciousness). Call your doctor now or seek immediate medical care if:  · You are confused and cannot think clearly. · You are dizzy or lightheaded, or you feel like you may faint. · You have signs of needing more fluids. You have sunken eyes and a dry mouth, and you pass only a little dark urine. · You cannot keep fluids down. Watch closely for changes in your health, and be sure to contact your doctor if:  · You are not making tears. · Your skin is very dry and sags slowly back into place after you pinch it. · Your mouth and eyes are very dry. Where can you learn more? Go to http://sammy-lainey.info/. Enter K891 in the search box to learn more about \"Dehydration: Care Instructions. \"  Current as of: May 27, 2016  Content Version: 11.2  © 7776-7598 [employer].  Care instructions adapted under license by [employer] (which disclaims liability or warranty for this information). If you have questions about a medical condition or this instruction, always ask your healthcare professional. [address] any warranty or liability for your use of this information. Diarrhea: Care Instructions  Your Care Instructions    Diarrhea is loose, watery stools (bowel movements). The exact cause is often hard to find. Sometimes diarrhea is your body's way of getting rid of what caused an upset stomach. Viruses, food poisoning, and many medicines can cause diarrhea. Some people get diarrhea in response to emotional stress, anxiety, or certain foods. Almost everyone has diarrhea now and then. It usually isn't serious, and your stools will return to normal soon. The important thing to do is replace the fluids you have lost, so you can prevent dehydration. The doctor has checked you carefully, but problems can develop later. If you notice any problems or new symptoms, get medical treatment right away. Follow-up care is a key part of your treatment and safety. Be sure to make and go to all appointments, and call your doctor if you are having problems. It's also a good idea to know your test results and keep a list of the medicines you take. How can you care for yourself at home? · Watch for signs of dehydration, which means your body has lost too much water. Dehydration is a serious condition and should be treated right away. Signs of dehydration are:  ¨ Increasing thirst and dry eyes and mouth. ¨ Feeling faint or lightheaded. ¨ Darker urine, and a smaller amount of urine than normal.  · To prevent dehydration, drink plenty of fluids, enough so that your urine is light yellow or clear like water. Choose water and other caffeine-free clear liquids until you feel better. If you have kidney, heart, or liver disease and have to limit fluids, talk with your doctor before you increase the amount of fluids you drink.   · Begin eating small amounts of mild foods the next day, if you feel like it. ¨ Try yogurt that has live cultures of Lactobacillus. (Check the label.)  ¨ Avoid spicy foods, fruits, alcohol, and caffeine until 48 hours after all symptoms are gone. ¨ Avoid chewing gum that contains sorbitol. ¨ Avoid dairy products (except for yogurt with Lactobacillus) while you have diarrhea and for 3 days after symptoms are gone. · The doctor may recommend that you take over-the-counter medicine, such as loperamide (Imodium), if you still have diarrhea after 6 hours. Read and follow all instructions on the label. Do not use this medicine if you have bloody diarrhea, a high fever, or other signs of serious illness. Call your doctor if you think you are having a problem with your medicine. When should you call for help? Call 911 anytime you think you may need emergency care. For example, call if:  · You passed out (lost consciousness). · Your stools are maroon or very bloody. Call your doctor now or seek immediate medical care if:  · You are dizzy or lightheaded, or you feel like you may faint. · Your stools are black and look like tar, or they have streaks of blood. · You have new or worse belly pain. · You have symptoms of dehydration, such as:  ¨ Dry eyes and a dry mouth. ¨ Passing only a little dark urine. ¨ Feeling thirstier than usual.  · You have a new or higher fever. Watch closely for changes in your health, and be sure to contact your doctor if:  · Your diarrhea is getting worse. · You see pus in the diarrhea. · You are not getting better after 2 days (48 hours). Where can you learn more? Go to http://sammy-lainey.info/. Enter L821 in the search box to learn more about \"Diarrhea: Care Instructions. \"  Current as of: May 27, 2016  Content Version: 11.2  © 1994-7670 [employer]. Care instructions adapted under license by [employer] (which disclaims liability or warranty for this information).  If you have questions about a medical condition or this instruction, always ask your healthcare professional. [address] any warranty or liability for your use of this information. Nausea and Vomiting: Care Instructions  Your Care Instructions    When you are nauseated, you may feel weak and sweaty and notice a lot of saliva in your mouth. Nausea often leads to vomiting. Most of the time you do not need to worry about nausea and vomiting, but they can be signs of other illnesses. Two common causes of nausea and vomiting are stomach flu and food poisoning. Nausea and vomiting from viral stomach flu will usually start to improve within 24 hours. Nausea and vomiting from food poisoning may last from 12 to 48 hours. The doctor has checked you carefully, but problems can develop later. If you notice any problems or new symptoms, get medical treatment right away. Follow-up care is a key part of your treatment and safety. Be sure to make and go to all appointments, and call your doctor if you are having problems. It's also a good idea to know your test results and keep a list of the medicines you take. How can you care for yourself at home? · To prevent dehydration, drink plenty of fluids, enough so that your urine is light yellow or clear like water. Choose water and other caffeine-free clear liquids until you feel better. If you have kidney, heart, or liver disease and have to limit fluids, talk with your doctor before you increase the amount of fluids you drink. · Rest in bed until you feel better. · When you are able to eat, try clear soups, mild foods, and liquids until all symptoms are gone for 12 to 48 hours. Other good choices include dry toast, crackers, cooked cereal, and gelatin dessert, such as Jell-O. When should you call for help? Call 911 anytime you think you may need emergency care. For example, call if:  · You passed out (lost consciousness).   Call your doctor now or seek immediate medical care if:  · You have symptoms of dehydration, such as:  ¨ Dry eyes and a dry mouth. ¨ Passing only a little dark urine. ¨ Feeling thirstier than usual.  · You have new or worsening belly pain. · You have a new or higher fever. · You vomit blood or what looks like coffee grounds. Watch closely for changes in your health, and be sure to contact your doctor if:  · You have ongoing nausea and vomiting. · Your vomiting is getting worse. · Your vomiting lasts longer than 2 days. · You are not getting better as expected. Where can you learn more? Go to http://sammy-lainey.info/. Enter 25 [id number] in the search box to learn more about \"Nausea and Vomiting: Care Instructions. \"  Current as of: May 27, 2016  Content Version: 11.2  © 3122-8702 [employer]. Care instructions adapted under license by [employer] (which disclaims liability or warranty for this information). If you have questions about a medical condition or this instruction, always ask your healthcare professional. [place] [address] any warranty or liability for your use of this information.

## 2017-06-21 ENCOUNTER — PATIENT OUTREACH (OUTPATIENT)
Dept: INTERNAL MEDICINE CLINIC | Age: 72
End: 2017-06-21

## 2017-06-21 NOTE — PROGRESS NOTES
NNTOCED Call    Patient discharged on 6/18/17 from [facility]. Diagnosis:     Diagnoses   Nausea and vomiting, intractability of vomiting not specified, unspecified vomiting type    Diarrhea, unspecified type    Dehydration      Spoke with patient's  briefly, pt sleeping (identified by 2 patient identifiers) today. Per , pt continues to have diarrhea, but doesn't know how often or if pt has blood in stool.  just picked up imodium from store to give to pt. Advised  not to give imodium unless pt is having diarrhea an excessively. Explained that if his wife has a bacterial infection in stool, that it's best to rid it this from the body.  also stated that his wife has a 101 temp today, taking Tylenol. Denies N/V. Encouraged a bland diet and increase hydration.  also reports pt's BS's have been fluctuating anywhere from 230 down to 60. Scheduled an appt to see endo tomorrow. Pt has a f/u with PCP 6/23/17 & endo 6/22/17. Explained to  that this NN will send a staff message to PCP to see if pt should  stool kit before f/u appt. Message sent. This note will not be viewable in [address]

## 2017-06-23 ENCOUNTER — OFFICE VISIT (OUTPATIENT)
Dept: INTERNAL MEDICINE CLINIC | Age: 72
End: 2017-06-23

## 2017-06-23 VITALS
DIASTOLIC BLOOD PRESSURE: 54 MMHG | RESPIRATION RATE: 18 BRPM | HEIGHT: 62 IN | TEMPERATURE: 98.4 F | OXYGEN SATURATION: 98 % | HEART RATE: 61 BPM | WEIGHT: 193.13 LBS | BODY MASS INDEX: 35.54 KG/M2 | SYSTOLIC BLOOD PRESSURE: 100 MMHG

## 2017-06-23 DIAGNOSIS — I10 ESSENTIAL HYPERTENSION: ICD-10-CM

## 2017-06-23 DIAGNOSIS — E11.9 TYPE 2 DIABETES MELLITUS WITHOUT COMPLICATION, WITH LONG-TERM CURRENT USE OF INSULIN (HCC): ICD-10-CM

## 2017-06-23 DIAGNOSIS — R19.7 DIARRHEA OF PRESUMED INFECTIOUS ORIGIN: Primary | ICD-10-CM

## 2017-06-23 DIAGNOSIS — Z79.4 TYPE 2 DIABETES MELLITUS WITHOUT COMPLICATION, WITH LONG-TERM CURRENT USE OF INSULIN (HCC): ICD-10-CM

## 2017-06-23 RX ORDER — INSULIN GLARGINE 100 [IU]/ML
INJECTION, SOLUTION SUBCUTANEOUS
Refills: 1 | COMMUNITY
Start: 2017-05-31 | End: 2017-06-23 | Stop reason: SDUPTHER

## 2017-06-23 RX ORDER — BETAMETHASONE DIPROPIONATE 0.5 MG/G
OINTMENT TOPICAL
Refills: 1 | COMMUNITY
Start: 2017-05-22 | End: 2017-06-23 | Stop reason: ALTCHOICE

## 2017-06-23 RX ORDER — METFORMIN HYDROCHLORIDE 500 MG/1
1000 TABLET, EXTENDED RELEASE ORAL 2 TIMES DAILY
Refills: 3 | COMMUNITY
Start: 2017-05-04

## 2017-06-23 NOTE — PATIENT INSTRUCTIONS
Diarrhea: Care Instructions  Your Care Instructions    Diarrhea is loose, watery stools (bowel movements). The exact cause is often hard to find. Sometimes diarrhea is your body's way of getting rid of what caused an upset stomach. Viruses, food poisoning, and many medicines can cause diarrhea. Some people get diarrhea in response to emotional stress, anxiety, or certain foods. Almost everyone has diarrhea now and then. It usually isn't serious, and your stools will return to normal soon. The important thing to do is replace the fluids you have lost, so you can prevent dehydration. The doctor has checked you carefully, but problems can develop later. If you notice any problems or new symptoms, get medical treatment right away. Follow-up care is a key part of your treatment and safety. Be sure to make and go to all appointments, and call your doctor if you are having problems. It's also a good idea to know your test results and keep a list of the medicines you take. How can you care for yourself at home? · Watch for signs of dehydration, which means your body has lost too much water. Dehydration is a serious condition and should be treated right away. Signs of dehydration are:  ¨ Increasing thirst and dry eyes and mouth. ¨ Feeling faint or lightheaded. ¨ Darker urine, and a smaller amount of urine than normal.  · To prevent dehydration, drink plenty of fluids, enough so that your urine is light yellow or clear like water. Choose water and other caffeine-free clear liquids until you feel better. If you have kidney, heart, or liver disease and have to limit fluids, talk with your doctor before you increase the amount of fluids you drink. · Begin eating small amounts of mild foods the next day, if you feel like it. ¨ Try yogurt that has live cultures of Lactobacillus. (Check the label.)  ¨ Avoid spicy foods, fruits, alcohol, and caffeine until 48 hours after all symptoms are gone.   ¨ Avoid chewing gum that contains sorbitol. ¨ Avoid dairy products (except for yogurt with Lactobacillus) while you have diarrhea and for 3 days after symptoms are gone. · The doctor may recommend that you take over-the-counter medicine, such as loperamide (Imodium), if you still have diarrhea after 6 hours. Read and follow all instructions on the label. Do not use this medicine if you have bloody diarrhea, a high fever, or other signs of serious illness. Call your doctor if you think you are having a problem with your medicine. When should you call for help? Call 911 anytime you think you may need emergency care. For example, call if:  · You passed out (lost consciousness). · Your stools are maroon or very bloody. Call your doctor now or seek immediate medical care if:  · You are dizzy or lightheaded, or you feel like you may faint. · Your stools are black and look like tar, or they have streaks of blood. · You have new or worse belly pain. · You have symptoms of dehydration, such as:  ¨ Dry eyes and a dry mouth. ¨ Passing only a little dark urine. ¨ Feeling thirstier than usual.  · You have a new or higher fever. Watch closely for changes in your health, and be sure to contact your doctor if:  · Your diarrhea is getting worse. · You see pus in the diarrhea. · You are not getting better after 2 days (48 hours). Where can you learn more? Go to http://sammy-lainey.info/. Enter H893 in the search box to learn more about \"Diarrhea: Care Instructions. \"  Current as of: March 20, 2017  Content Version: 11.3  © 5074-1281 [employer]. Care instructions adapted under license by [employer] (which disclaims liability or warranty for this information). If you have questions about a medical condition or this instruction, always ask your healthcare professional. [address] any warranty or liability for your use of this information.

## 2017-06-23 NOTE — PROGRESS NOTES
Yaneth Rhoades is a 67 y.o. female and presents with Transitions Of Care  . diarrhea  No diarrhea since yesterday 6/22/17. She reports sx started 6 days ago with profuse diarrhea, vomitting and fever. She had nausea. She took imodium otc. Her fever was intermittent and finally broke on her [birth year]. She is voiding 5-6 times per day and clear urine. She feels like she is getting a little better. She is off metformin. She is not as much nauseas as well. Diabetes  BS have been labile  She was seen by dr. Zackary Mcdonald who recommended metformin with gastroenteritis sx  Will follow up with Dr. Zackary Mcdonald after able to eat consistent food     Subjective:   Yaneth Rhoades is a 67 y.o. female with hypertension. Hypertension ROS: taking medications as instructed, no medication side effects noted, no TIA's, no chest pain on exertion, no dyspnea on exertion, no swelling of ankles. New concerns: she reports feeling a little light headed but no falls. Sleep apnea  Saw Dr. Mayra Granados, pt is on CPAP machine  She had pressure changed but now has settled into new change  Sleeping 6-7 hours/night now     SUBJECTIVE: Yaneth Rhoades is a 67 y.o. female here for follow up of hypothyroidism. Lab Results   Component Value Date/Time    TSH 3.720 04/06/2017 09:29 AM     Thyroid ROS: denies fatigue, weight changes, heat/cold intolerance, bowel/skin changes or CVS symptoms. Review of Systems  Constitutional: negative for fevers, chills, anorexia and weight loss  Eyes:   negative for visual disturbance and irritation  ENT:   negative for tinnitus,sore throat,nasal congestion,ear pains. hoarseness  Respiratory:  negative for cough, hemoptysis, dyspnea,wheezing  CV:   negative for chest pain, palpitations, lower extremity edema  GI:   negative for nausea, vomiting, diarrhea, abdominal pain,melena  Endo:               negative for polyuria,polydipsia,polyphagia,heat intolerance  Genitourinary: negative for frequency, dysuria and hematuria  Integument:  negative for rash and pruritus  Hematologic:  negative for easy bruising and gum/nose bleeding  Musculoskel: negative for myalgias, arthralgias, back pain, muscle weakness, joint pain  Neurological:  negative for headaches, dizziness, vertigo, memory problems and gait   Behavl/Psych: negative for feelings of anxiety, depression, mood changes    Past Medical History:   Diagnosis Date    Arthritis     CAD (coronary artery disease) 2006    STENT PLACED>PLAQUE BROKE OFF--HAD \"MINOR MI\"    Deviated septum 13    HAS TROUBLE BREATHING THROUGH LEFT SIDE; CT SHOWED POLYP    Diabetes ([address]     Heart disease     Hypertension     Incontinence     Nasal polyp     Unspecified sleep apnea 13    CAN'T TOLERATE CPAP     Past Surgical History:   Procedure Laterality Date    CARDIAC SURG PROCEDURE UNLIST      CARDIAC CATH;ANGIOPLASTY WITH STENT    HX  SECTION      X3    HX COLONOSCOPY      HX HEENT  ? SEPTOPLASTY     Social History     Social History    Marital status:      Spouse name: N/A    Number of children: N/A    Years of education: N/A     Social History Main Topics    Smoking status: Former Smoker     Packs/day: 1.00     Years: 5.00     Quit date: 1970    Smokeless tobacco: None    Alcohol use Yes      Comment: \"VERY LITTLE ALCOHOL\"    Drug use: No    Sexual activity: Not Asked     Other Topics Concern    None     Social History Narrative            3 children women 36, 40, 40 healthy other than skin issues     Family History   Problem Relation Age of Onset    Heart Disease Mother     Heart Surgery Mother      VALVE REPLACED AT AGE 72    Heart Attack Father 46     MASSIVE     Anesth Problems Neg Hx      Current Outpatient Prescriptions   Medication Sig Dispense Refill    ondansetron hcl (ZOFRAN) 4 mg tablet Take 1 Tab by mouth every eight (8) hours as needed for Nausea.  12 Tab 0    promethazine (PHENERGAN) 25 mg tablet Take 1 Tab by mouth every six (6) hours as needed. 12 Tab 0    magnesium oxide (MAG-OX) 400 mg tablet Take 1 Tab by mouth daily. 30 Tab 0    losartan-hydroCHLOROthiazide (HYZAAR) 50-12.5 mg per tablet TAKE 1 TAB BY MOUTH TWO (2) TIMES A DAY. 180 Tab 0    levothyroxine (SYNTHROID) 25 mcg tablet TAKE 1 TABLET BY MOUTH EVERY DAY BEFORE BREAKFAST 90 Tab 0    metoprolol tartrate (LOPRESSOR) 50 mg tablet TAKE 1 TAB BY MOUTH DAILY. 90 Tab 1    losartan (COZAAR) 25 mg tablet TAKE 1 TABLET BY MOUTH EVERY DAY 90 Tab 0    simvastatin (ZOCOR) 20 mg tablet Take 1 Tab by mouth nightly. 90 Tab 1    sertraline (ZOLOFT) 100 mg tablet TAKE 1 TAB BY MOUTH EVERY EVENING. 90 Tab 3    Blood Sugar Diagnostic, Drum (ACCU-CHEK COMPACT PLUS TEST) strp Use to test blood sugar 4 times daily. DX E11.9 360 Strip 6    insulin glargine (LANTUS) 100 unit/mL injection 20 Units by SubCUTAneous route two (2) times a day. 1 Vial 3    HUMALOG KWIKPEN 100 unit/mL kwikpen   3    Cholecalciferol, Vitamin D3, (VITAMIN D3) 1,000 unit cap Take 1 Cap by mouth nightly. 30 Cap 3    glimepiride (AMARYL) 4 mg tablet Take  by mouth every morning.  Aspirin, Buffered 81 mg tab Take  by mouth daily.  Cetirizine (ZYRTEC) 10 mg cap Take  by mouth as needed.  omega-3 acid ethyl esters (LOVAZA) 1 gram capsule Take 2 g by mouth two (2) times a day.       metFORMIN ER (GLUCOPHAGE XR) 500 mg tablet TAKE 4 TABLETS BY MOUTH EVERY DAY  3     Allergies   Allergen Reactions    Adhesive Rash and Itching    Advil [Ibuprofen] Swelling     FEET SWELL    Ceftin [Cefuroxime Axetil] Itching    Lotensin [Benazepril] Cough    Penicillins Swelling    Sulfa (Sulfonamide Antibiotics) Rash, Itching and Swelling       Objective:  Visit Vitals    /54 (BP 1 Location: Left arm, BP Patient Position: Sitting)    Pulse 61    Temp 98.4 °F (36.9 °C) (Oral)    Resp 18    Ht 5' 2\" (1.575 m)    Wt 193 lb 2 oz (87.6 kg)    SpO2 98%    BMI 35.32 kg/m2     Physical Exam:   General appearance - alert, well appearing, and in no distress  Mental status - alert, oriented to person, place, and time  EYE-SHARAD, EOMI, corneas normal, no foreign bodies, visual acuity normal both eyes, no periorbital cellulitis  ENT-ENT exam normal, no neck nodes or sinus tenderness  Nose - normal and patent, no erythema, discharge or polyps  Mouth - mucous membranes moist, pharynx normal without lesions  Neck - supple, no significant adenopathy   Chest - clear to auscultation, no wheezes, rales or rhonchi, symmetric air entry   Heart - normal rate, regular rhythm, normal S1, S2, no murmurs, rubs, clicks or gallops   Abdomen - soft, nontender, nondistended, no masses or organomegaly, obese, no rebound  Lymph- no adenopathy palpable  Ext-peripheral pulses normal, no pedal edema, no clubbing or cyanosis, + 1 edema  Skin-Warm and dry. no hyperpigmentation, vitiligo, or suspicious lesions, scattered resolved superifical skin bites, no erythema  Neuro -alert, oriented, normal speech, no focal findings or movement disorder noted  Neck-normal C-spine, no tenderness, full ROM without pain      Results for orders placed or performed during the hospital encounter of 06/18/17   CBC WITH AUTOMATED DIFF   Result Value Ref Range    WBC 10.9 3.6 - 11.0 K/uL    RBC 4.53 3.80 - 5.20 M/uL    HGB 13.7 11.5 - 16.0 g/dL    HCT 39.2 35.0 - 47.0 %    MCV 86.5 80.0 - 99.0 FL    MCH 30.2 26.0 - 34.0 PG    MCHC 34.9 30.0 - 36.5 g/dL    RDW 13.2 11.5 - 14.5 %    PLATELET 889 101 - 215 K/uL    NEUTROPHILS 64 32 - 75 %    LYMPHOCYTES 28 12 - 49 %    MONOCYTES 8 5 - 13 %    EOSINOPHILS 0 0 - 7 %    BASOPHILS 0 0 - 1 %    ABS. NEUTROPHILS 6.9 1.8 - 8.0 K/UL    ABS. LYMPHOCYTES 3.1 0.8 - 3.5 K/UL    ABS. MONOCYTES 0.9 0.0 - 1.0 K/UL    ABS. EOSINOPHILS 0.0 0.0 - 0.4 K/UL    ABS.  BASOPHILS 0.0 0.0 - 0.1 K/UL    DF SMEAR SCANNED      RBC COMMENTS NORMOCYTIC, NORMOCHROMIC     METABOLIC PANEL, COMPREHENSIVE   Result Value Ref Range Sodium 138 136 - 145 mmol/L    Potassium 3.6 3.5 - 5.1 mmol/L    Chloride 103 97 - 108 mmol/L    CO2 25 21 - 32 mmol/L    Anion gap 10 5 - 15 mmol/L    Glucose 115 (H) 65 - 100 mg/dL    BUN 22 (H) 6 - 20 MG/DL    Creatinine 1.10 (H) 0.55 - 1.02 MG/DL    BUN/Creatinine ratio 20 12 - 20      GFR est AA 59 (L) >60 ml/min/1.73m2    GFR est non-AA 49 (L) >60 ml/min/1.73m2    Calcium 9.3 8.5 - 10.1 MG/DL    Bilirubin, total 0.6 0.2 - 1.0 MG/DL    ALT (SGPT) 40 12 - 78 U/L    AST (SGOT) 27 15 - 37 U/L    Alk.  phosphatase 93 45 - 117 U/L    Protein, total 8.0 6.4 - 8.2 g/dL    Albumin 3.9 3.5 - 5.0 g/dL    Globulin 4.1 (H) 2.0 - 4.0 g/dL    A-G Ratio 1.0 (L) 1.1 - 2.2     LIPASE   Result Value Ref Range    Lipase 160 73 - 393 U/L   URINALYSIS W/ RFLX MICROSCOPIC   Result Value Ref Range    Color YELLOW/STRAW      Appearance CLEAR CLEAR      Specific gravity 1.022 1.003 - 1.030      pH (UA) 6.0 5.0 - 8.0      Protein 300 (A) NEG mg/dL    Glucose NEGATIVE  NEG mg/dL    Ketone NEGATIVE  NEG mg/dL    Bilirubin NEGATIVE  NEG      Blood TRACE (A) NEG      Urobilinogen 0.2 0.2 - 1.0 EU/dL    Nitrites NEGATIVE  NEG      Leukocyte Esterase NEGATIVE  NEG      WBC 0-4 0 - 4 /hpf    RBC 5-10 0 - 5 /hpf    Epithelial cells FEW FEW /lpf    Bacteria NEGATIVE  NEG /hpf    Hyaline cast 0-2 0 - 5 /lpf     Prevention    Cardiovascular profile  Family hx  Exercising:  Water aerobics at [employer]  Blood pressure:  Health healthy diet:  Diabetes:  Cholesterol:  Renal function:      Cancer risk profile  Mammogram ordered [address]  Colonoscopy 2013 at [facility], was told did not need repeat unless sx  Skin nonhealing in 2 weeks  Gyn abnormal bleeding/discharge/abd pain/pressure no issues, no symptoms      Thyroid sx  Fatigue, sleep apnea    Osteopenia prevention  Calcium 1000mg/day yes  Vitamin D 800iu/day yes    Mental health scale: 8/10  zoloft correct dose  Depression  Anxiety  Sleep # of hours:  Energy Level: Immunizations  TDAP  Pneumonia vaccine  Flu vaccine  Shingles vaccine  HPV    Francoise Garnica was seen today for transitions of care. Diagnoses and all orders for this visit:    Diarrhea of presumed infectious origin  Reviewed labs from Dr. Candelario Briecno with pt, wnl  Discussed BRAT diet, increase po fluids to void every 4-5 hours  rest    Essential hypertension  Will discontinue losartan 25 mg daily for now because of hx of lightheadedness and diarrhea  Will monitor off rx    Type 2 diabetes mellitus without complication, with long-term current use of insulin ([facility] [address]  Cont off metformin      Cholesterol  If develops muscle ache/pain can stop statin for 2 weeks    This note will not be viewable in MyChart.   Follow up in 1-3 months for bp check or earlier if needed

## 2017-06-23 NOTE — MR AVS SNAPSHOT
Visit Information Date & Time Provider Department Dept. Phone Encounter #  
 6/23/2017  9:30 AM Anna Marie Garcia MD [facility] [address]  [id number] Your Appointments 7/17/2017  9:00 AM  
Any with Sharyn Smalls MD  
[address] [facility] ([place]) Appt Note: Yearly F/U, bring machine. [address] [facility] [address]  
  
   
 [address] [facility] [address] Upcoming Health Maintenance Date Due  
 FOOT EXAM Q1 6/20/1955 EYE EXAM RETINAL OR DILATED Q1 6/20/1955 FOBT Q 1 YEAR AGE 50-75 6/20/1995 GLAUCOMA SCREENING Q2Y 6/20/2010 OSTEOPOROSIS SCREENING (DEXA) 6/20/2010 MEDICARE YEARLY EXAM 6/20/2010 Pneumococcal 65+ High/Highest Risk (2 of 2 - PCV13) 11/30/2011 HEMOGLOBIN A1C Q6M 10/27/2016 INFLUENZA AGE 9 TO ADULT 8/1/2017 MICROALBUMIN Q1 4/6/2018 LIPID PANEL Q1 4/6/2018 BREAST CANCER SCRN MAMMOGRAM 4/19/2019 DTaP/Tdap/Td series (2 - Td) 1/15/2025 Allergies as of 6/23/2017  Review Complete On: 6/23/2017 By: Anna Marie Garcia MD  
  
 Severity Noted Reaction Type Reactions Adhesive  12/20/2013    Rash, Itching Advil [Ibuprofen]  12/20/2013    Swelling FEET SWELL Ceftin [Cefuroxime Axetil]  12/20/2013    Itching Lotensin [Benazepril]  12/20/2013    Cough Penicillins  12/20/2013    Swelling Sulfa (Sulfonamide Antibiotics)  12/20/2013    Rash, Itching, Swelling Current Immunizations  Never Reviewed Name Date Influenza High Dose Vaccine PF 10/1/2016, 9/22/2015 Pneumococcal Polysaccharide (PPSV-23) 11/30/2010 Tdap 1/15/2015 Zoster Vaccine, Live 7/27/2011 Not reviewed this visit Vitals BP Pulse Temp Resp Height(growth percentile) Weight(growth percentile)  100/54 (BP 1 Location: Left arm, BP Patient Position: Sitting) 61 98.4 °F (36.9 °C) (Oral) 18 5' 2\" (1.575 m) 193 lb 2 oz (87.6 kg) SpO2 BMI OB Status Smoking Status 98% 35.32 kg/m2 Postmenopausal Former Smoker Vitals History BMI and BSA Data Body Mass Index Body Surface Area  
 35.32 kg/m 2 1.96 m 2 Preferred Pharmacy Pharmacy Name Phone [id number]- [place], [address] [phone] Your Updated Medication List  
  
   
This list is accurate as of: 6/23/17 10:18 AM.  Always use your most recent med list.  
  
  
  
  
 aspirin, buffered 81 mg Tab Take  by mouth daily. Blood Sugar Diagnostic, Drum Strp Commonly known as:  ACCU-CHEK COMPACT PLUS TEST Use to test blood sugar 4 times daily. DX E11.9 cholecalciferol 1,000 unit Cap Commonly known as:  VITAMIN D3 Take 1 Cap by mouth nightly. glimepiride 4 mg tablet Commonly known as:  AMARYL Take  by mouth every morning. HumaLOG KwikPen 100 unit/mL kwikpen Generic drug:  insulin lispro  
  
 insulin glargine 100 unit/mL injection Commonly known as:  LANTUS  
20 Units by SubCUTAneous route two (2) times a day. levothyroxine 25 mcg tablet Commonly known as:  SYNTHROID  
TAKE 1 TABLET BY MOUTH EVERY DAY BEFORE BREAKFAST  
  
 losartan 25 mg tablet Commonly known as:  COZAAR  
TAKE 1 TABLET BY MOUTH EVERY DAY  
  
 losartan-hydroCHLOROthiazide 50-12.5 mg per tablet Commonly known as:  HYZAAR  
TAKE 1 TAB BY MOUTH TWO (2) TIMES A DAY. LOVAZA 1 gram capsule Generic drug:  omega-3 acid ethyl esters Take 2 g by mouth two (2) times a day. magnesium oxide 400 mg tablet Commonly known as:  MAG-OX Take 1 Tab by mouth daily. metFORMIN  mg tablet Commonly known as:  GLUCOPHAGE XR  
TAKE 4 TABLETS BY MOUTH EVERY DAY  
  
 metoprolol tartrate 50 mg tablet Commonly known as:  LOPRESSOR  
TAKE 1 TAB BY MOUTH DAILY. ondansetron hcl 4 mg tablet Commonly known as:  Anisa Motto Take 1 Tab by mouth every eight (8) hours as needed for Nausea. promethazine 25 mg tablet Commonly known as:  PHENERGAN Take 1 Tab by mouth every six (6) hours as needed. sertraline 100 mg tablet Commonly known as:  ZOLOFT  
TAKE 1 TAB BY MOUTH EVERY EVENING. simvastatin 20 mg tablet Commonly known as:  ZOCOR Take 1 Tab by mouth nightly. ZyrTEC 10 mg Cap Generic drug:  Cetirizine Take  by mouth as needed. Patient Instructions Diarrhea: Care Instructions Your Care Instructions Diarrhea is loose, watery stools (bowel movements). The exact cause is often hard to find. Sometimes diarrhea is your body's way of getting rid of what caused an upset stomach. Viruses, food poisoning, and many medicines can cause diarrhea. Some people get diarrhea in response to emotional stress, anxiety, or certain foods. Almost everyone has diarrhea now and then. It usually isn't serious, and your stools will return to normal soon. The important thing to do is replace the fluids you have lost, so you can prevent dehydration. The doctor has checked you carefully, but problems can develop later. If you notice any problems or new symptoms, get medical treatment right away. Follow-up care is a key part of your treatment and safety. Be sure to make and go to all appointments, and call your doctor if you are having problems. It's also a good idea to know your test results and keep a list of the medicines you take. How can you care for yourself at home? · Watch for signs of dehydration, which means your body has lost too much water. Dehydration is a serious condition and should be treated right away. Signs of dehydration are: 
¨ Increasing thirst and dry eyes and mouth. ¨ Feeling faint or lightheaded.  
¨ Darker urine, and a smaller amount of urine than normal. 
· To prevent dehydration, drink plenty of fluids, enough so that your urine is light yellow or clear like water. Choose water and other caffeine-free clear liquids until you feel better. If you have kidney, heart, or liver disease and have to limit fluids, talk with your doctor before you increase the amount of fluids you drink. · Begin eating small amounts of mild foods the next day, if you feel like it. ¨ Try yogurt that has live cultures of Lactobacillus. (Check the label.) ¨ Avoid spicy foods, fruits, alcohol, and caffeine until 48 hours after all symptoms are gone. ¨ Avoid chewing gum that contains sorbitol. ¨ Avoid dairy products (except for yogurt with Lactobacillus) while you have diarrhea and for 3 days after symptoms are gone. · The doctor may recommend that you take over-the-counter medicine, such as loperamide (Imodium), if you still have diarrhea after 6 hours. Read and follow all instructions on the label. Do not use this medicine if you have bloody diarrhea, a high fever, or other signs of serious illness. Call your doctor if you think you are having a problem with your medicine. When should you call for help? Call 911 anytime you think you may need emergency care. For example, call if: 
· You passed out (lost consciousness). · Your stools are maroon or very bloody. Call your doctor now or seek immediate medical care if: 
· You are dizzy or lightheaded, or you feel like you may faint. · Your stools are black and look like tar, or they have streaks of blood. · You have new or worse belly pain. · You have symptoms of dehydration, such as: ¨ Dry eyes and a dry mouth. ¨ Passing only a little dark urine. ¨ Feeling thirstier than usual. 
· You have a new or higher fever. Watch closely for changes in your health, and be sure to contact your doctor if: 
· Your diarrhea is getting worse. · You see pus in the diarrhea. · You are not getting better after 2 days (48 hours). Where can you learn more? Go to http://sammy-lainey.info/. Enter L699 in the search box to learn more about \"Diarrhea: Care Instructions. \" Current as of: March 20, 2017 Content Version: 11.3 © 6239-7198 [employer]. Care instructions adapted under license by [employer] (which disclaims liability or warranty for this information). If you have questions about a medical condition or this instruction, always ask your healthcare professional. [address] any warranty or liability for your use of this information. Introducing [facility]! Dear Dianne Grimes: Thank you for requesting a [employer] Perch account. Our records indicate that you already have an active Blue Perch account. You can access your account anytime at [email or web address] Did you know that you can access your hospital and ER discharge instructions at any time in Blue Perch? You can also review all of your test results from your hospital stay or ER visit. Additional Information If you have questions, please visit the Frequently Asked Questions section of the [employer] Perch website at [email or web address] Remember, Blue Perch is NOT to be used for urgent needs. For medical emergencies, dial 911. Now available from your iPhone and Android! Please provide this summary of care documentation to your next provider. Your primary care clinician is listed as Beka Seo. If you have any questions after today's visit, please call [phone].

## 2017-07-06 RX ORDER — LANOLIN ALCOHOL/MO/W.PET/CERES
CREAM (GRAM) TOPICAL
Qty: 30 TAB | Refills: 0 | Status: SHIPPED | OUTPATIENT
Start: 2017-07-06 | End: 2017-11-05 | Stop reason: SDUPTHER

## 2017-07-10 DIAGNOSIS — E11.9 DIABETES MELLITUS WITHOUT COMPLICATION (HCC): ICD-10-CM

## 2017-07-10 RX ORDER — LANCETS
EACH MISCELLANEOUS
Qty: 357 STRIP | Refills: 3 | Status: SHIPPED | OUTPATIENT
Start: 2017-07-10 | End: 2018-02-19 | Stop reason: SDUPTHER

## 2017-07-11 ENCOUNTER — TELEPHONE (OUTPATIENT)
Dept: INTERNAL MEDICINE CLINIC | Age: 72
End: 2017-07-11

## 2017-07-11 NOTE — TELEPHONE ENCOUNTER
Please call [facility] Pharm Audit- [phone], They are looking for a form they had faxed over to you. When you call back have Dr Trini Angeles NPI number handy.

## 2017-07-17 ENCOUNTER — OFFICE VISIT (OUTPATIENT)
Dept: SLEEP MEDICINE | Age: 72
End: 2017-07-17

## 2017-07-17 VITALS
DIASTOLIC BLOOD PRESSURE: 69 MMHG | WEIGHT: 199.5 LBS | HEIGHT: 62 IN | BODY MASS INDEX: 36.71 KG/M2 | SYSTOLIC BLOOD PRESSURE: 112 MMHG | OXYGEN SATURATION: 96 % | HEART RATE: 60 BPM

## 2017-07-17 DIAGNOSIS — I10 ESSENTIAL HYPERTENSION WITH GOAL BLOOD PRESSURE LESS THAN 130/80: ICD-10-CM

## 2017-07-17 DIAGNOSIS — G47.33 OBSTRUCTIVE SLEEP APNEA SYNDROME: Primary | ICD-10-CM

## 2017-07-17 NOTE — PROGRESS NOTES
[address] [facility] [place] [place], [address]  Tel.  [phone]  Fax. [address] [facility] [address]  [place], [address] [facility]  Tel.  [phone]  Fax. [phone] [address] [address] [address]  Tel.  [phone]  Fax. [phone]     S>Quentin Sandhu is a 67 y.o. female seen for a positive airway pressure follow-up. She reports moderate problems using the device. The following problems are identified:    Drowsiness no Problems exhaling no   Snoring no Forget to put on Yes, her bedtime routing has been irregular and she has often been falling asleep in her chair for several hours at night and when she transfers to the bed, she does not always put on the CPAP   Mask Comfortable yes Can't fall asleep no   Dry Mouth yes Mask falls off no   Air Leaking no Frequent awakenings no     She falls asleep in her chair    She admits that her sleep has improved. Therapy Apnea Index averaged over PAP use: 3 /hr which reflects significantly improved sleep breathing condition. Allergies   Allergen Reactions    Adhesive Rash and Itching    Advil [Ibuprofen] Swelling     FEET SWELL    Ceftin [Cefuroxime Axetil] Itching    Lotensin [Benazepril] Cough    Penicillins Swelling    Sulfa (Sulfonamide Antibiotics) Rash, Itching and Swelling       She has a current medication list which includes the following prescription(s): accu-chek compact plus test, magnesium oxide, metformin er, losartan-hydrochlorothiazide, levothyroxine, metoprolol tartrate, losartan, simvastatin, sertraline, insulin glargine, humalog kwikpen, cholecalciferol, aspirin, buffered, omega-3 acid ethyl esters, ondansetron hcl, promethazine, glimepiride, and cetirizine. .      She  has a past medical history of Arthritis; CAD (coronary artery disease) (2006); Deviated septum (12/20/13); Diabetes ([facility] [address] Heart disease; Hypertension; Incontinence; Nasal polyp; and Unspecified sleep apnea (12/20/13).     [place] Sleepiness Score: 11   and Modified F.O.S.Q. Score Total / 2: 16.5  O>    Visit Vitals    /69 (BP 1 Location: Left arm, BP Patient Position: Sitting)    Pulse 60    Ht 5' 2\" (1.575 m)    Wt 199 lb 8 oz (90.5 kg)    SpO2 96%    BMI 36.49 kg/m2           General:   Alert, oriented, not in distress   Neck:   No JVD    Chest/Lungs:  symetrical lung expansion , no accessory muscle use    Extremities:  no obvious rashes , negative edema    Neuro:  No focal deficits ; No obvious tremor    Psych:  Normal affect ,  Normal countenance ;           A>    ICD-10-CM ICD-9-CM    1. Obstructive sleep apnea syndrome G47.33 327.23    2. Essential hypertension with goal blood pressure less than 130/80 I10 401.9      AHI = 19(3-16). On CPAP :  5-10 cmH2O. Compliant:      no    Therapeutic Response:  Positive    P>      * Follow-up Disposition:  Return in about 1 month (around 8/17/2017). Increase humidity setting. she will continue on her current pressure settings. Return in 30 days and order supplies. I have reviewed medicare requirements regarding PAP usage    * She was asked to contact our office for any problems regarding PAP therapy. *Counseling was provided regarding the importance of regular PAP use and on proper sleep hygiene and safe driving. * Re-enforced proper and regular cleaning for the device. I have counseled the patient regarding the benefits of weight loss. 2. Hypertension - she continues on her current regimen. I have reviewed the relationship between hypertension as it relates to sleep-disordered breathing.      Elio De La Rosa MD  Diplomate in Sleep Medicine  [facility]

## 2017-07-17 NOTE — PATIENT INSTRUCTIONS
[address] [place]. [place], [address]  Tel.  [phone]  Fax. [address] [facility] 60  [place], [address] [facility]  Tel.  [phone]  Fax. [phone] [address] [facility] Kacie Campos  Tel.  [phone]  Fax. [phone]     PROPER SLEEP HYGIENE    What to avoid  · Do not have drinks with caffeine, such as coffee or black tea, for 8 hours before bed. · Do not smoke or use other types of tobacco near bedtime. Nicotine is a stimulant and can keep you awake. · Avoid drinking alcohol late in the evening, because it can cause you to wake in the middle of the night. · Do not eat a big meal close to bedtime. If you are hungry, eat a light snack. · Do not drink a lot of water close to bedtime, because the need to urinate may wake you up during the night. · Do not read or watch TV in bed. Use the bed only for sleeping and sexual activity. What to try  · Go to bed at the same time every night, and wake up at the same time every morning. Do not take naps during the day. · Keep your bedroom quiet, dark, and cool. · Get regular exercise, but not within 3 to 4 hours of your bedtime. .  · Sleep on a comfortable pillow and mattress. · If watching the clock makes you anxious, turn it facing away from you so you cannot see the time. · If you worry when you lie down, start a worry book. Well before bedtime, write down your worries, and then set the book and your concerns aside. · Try meditation or other relaxation techniques before you go to bed. · If you cannot fall asleep, get up and go to another room until you feel sleepy. Do something relaxing. Repeat your bedtime routine before you go to bed again. · Make your house quiet and calm about an hour before bedtime. Turn down the lights, turn off the TV, log off the computer, and turn down the volume on music. This can help you relax after a busy day.     Drowsy Driving  The [address] [place] [address] Road Traffic Safety Administration cites drowsiness as a causing factor in more than 455,629 police reported crashes annually, resulting in 76,000 injuries and 1,500 deaths. Other surveys suggest 55% of people polled have driven while drowsy in the past year, 23% had fallen asleep but not crashed, 3% crashed, and 2% had and accident due to drowsy driving. Who is at risk? Young Drivers: One study of drowsy driving accidents states that 55% of the drivers were under 25 years. Of those, 75% were male. Shift Workers and Travelers: People who work overnight or travel across time zones frequently are at higher risk of experiencing Circadian Rhythm Disorders. They are trying to work and function when their body is programed to sleep. Sleep Deprived: Lack of sleep has a serious impact on your ability to pay attention or focus on a task. Consistently getting less than the average of 8 hours your body needs creates partial or cumulative sleep deprivation. Untreated Sleep Disorders: Sleep Apnea, Narcolepsy, R.L.S., and other sleep disorders (untreated) prevent a person from getting enough restful sleep. This leads to excessive daytime sleepiness and increases the risk for drowsy driving accidents by up to 7 times. Medications / Alcohol: Even over the counter medications can cause drowsiness. Medications that impair a drivers attention should have a warning label. Alcohol naturally makes you sleepy and on its own can cause accidents. Combined with excessive drowsiness its effects are amplified. Signs of Drowsy Driving:   * You don't remember driving the last few miles   * You may drift out of your jerry   * You are unable to focus and your thoughts wander   * You may yawn more often than normal   * You have difficulty keeping your eyes open / nodding off   * Missing traffic signs, speeding, or tailgating  Prevention-   Good sleep hygiene, lifestyle and behavioral choices have the most impact on drowsy driving.  There is no substitute for sleep and the average person requires 8 hours nightly. If you find yourself driving drowsy, stop and sleep. Consider the sleep hygiene tips provided during your visit as well. Medication Refill Policy: Refills for all medications require 1 week advance notice. Please have your pharmacy fax a refill request. We are unable to fax, or call in \"controled substance\" medications and you will need to pick these prescriptions up from our office. [employer] Activation    Thank you for requesting access to lark. Please follow the instructions below to securely access and download your online medical record. lark allows you to send messages to your doctor, view your test results, renew your prescriptions, schedule appointments, and more. How Do I Sign Up? 1. In your internet browser, go to [email or web address]. [email or web address]. 2. Click on the First Time User? Click Here link in the Sign In box. You will see the New Member Sign Up page. 3. Enter your lark Access Code exactly as it appears below. You will not need to use this code after youve completed the sign-up process. If you do not sign up before the expiration date, you must request a new code. lark Access Code: Activation code not generated  Current lark Status: Active (This is the date your lark access code will )    4. Enter the last four digits of your Social Security Number (xxxx) and Date of Birth (mm/dd/yyyy) as indicated and click Submit. You will be taken to the next sign-up page. 5. Create a lark ID. This will be your lark login ID and cannot be changed, so think of one that is secure and easy to remember. 6. Create a lark password. You can change your password at any time. 7. Enter your Password Reset Question and Answer. This can be used at a later time if you forget your password. 8. Enter your e-mail address. You will receive e-mail notification when new information is available in [address]   9. Click Sign Up. You can now view and download portions of your medical record. 10. Click the Download Summary menu link to download a portable copy of your medical information. Additional Information    If you have questions, please call [phone]. Remember, [employer] is NOT to be used for urgent needs. For medical emergencies, dial 911.

## 2017-07-21 ENCOUNTER — HOSPITAL ENCOUNTER (OUTPATIENT)
Dept: LAB | Age: 72
Discharge: HOME | End: 2017-07-21
Payer: MEDICARE

## 2017-07-21 ENCOUNTER — OFFICE VISIT (OUTPATIENT)
Dept: INTERNAL MEDICINE CLINIC | Age: 72
End: 2017-07-21

## 2017-07-21 VITALS
HEART RATE: 50 BPM | BODY MASS INDEX: 36.51 KG/M2 | DIASTOLIC BLOOD PRESSURE: 55 MMHG | HEIGHT: 62 IN | RESPIRATION RATE: 14 BRPM | OXYGEN SATURATION: 97 % | WEIGHT: 198.4 LBS | TEMPERATURE: 97.9 F | SYSTOLIC BLOOD PRESSURE: 128 MMHG

## 2017-07-21 DIAGNOSIS — E83.42 HYPOMAGNESEMIA: ICD-10-CM

## 2017-07-21 DIAGNOSIS — Z13.39 SCREENING FOR ALCOHOLISM: ICD-10-CM

## 2017-07-21 DIAGNOSIS — Z79.4 TYPE 2 DIABETES MELLITUS WITHOUT COMPLICATION, WITH LONG-TERM CURRENT USE OF INSULIN (HCC): ICD-10-CM

## 2017-07-21 DIAGNOSIS — I10 ESSENTIAL HYPERTENSION: ICD-10-CM

## 2017-07-21 DIAGNOSIS — Z13.31 SCREENING FOR DEPRESSION: ICD-10-CM

## 2017-07-21 DIAGNOSIS — E11.9 TYPE 2 DIABETES MELLITUS WITHOUT COMPLICATION, WITH LONG-TERM CURRENT USE OF INSULIN (HCC): ICD-10-CM

## 2017-07-21 DIAGNOSIS — Z00.00 MEDICARE ANNUAL WELLNESS VISIT, INITIAL: Primary | ICD-10-CM

## 2017-07-21 DIAGNOSIS — Z71.89 ADVANCED CARE PLANNING/COUNSELING DISCUSSION: ICD-10-CM

## 2017-07-21 DIAGNOSIS — Z00.00 ROUTINE GENERAL MEDICAL EXAMINATION AT A HEALTH CARE FACILITY: ICD-10-CM

## 2017-07-21 PROCEDURE — 80053 COMPREHEN METABOLIC PANEL: CPT

## 2017-07-21 PROCEDURE — 83735 ASSAY OF MAGNESIUM: CPT

## 2017-07-21 PROCEDURE — 36415 COLL VENOUS BLD VENIPUNCTURE: CPT

## 2017-07-21 NOTE — PROGRESS NOTES
Nurse Navigator Medicare Wellness Visit performed by DARRIAN Seo    This is an Initial Silvio Exam (AWV) (Performed 12 months after IPPE or effective date of Medicare Part B enrollment, Once in a lifetime)    I have reviewed the patient's medical history in detail and updated the computerized patient record. History     Past Medical History:   Diagnosis Date    Arthritis     CAD (coronary artery disease)     STENT PLACED>PLAQUE BROKE OFF--HAD \"MINOR MI\"    Deviated septum 13    HAS TROUBLE BREATHING THROUGH LEFT SIDE; CT SHOWED POLYP    Diabetes ([address]     Heart disease     Hypertension     Incontinence     Nasal polyp     Unspecified sleep apnea 13    CAN'T TOLERATE CPAP      Past Surgical History:   Procedure Laterality Date    CARDIAC SURG PROCEDURE UNLIST      CARDIAC CATH;ANGIOPLASTY WITH STENT    HX  SECTION      X3    HX COLONOSCOPY      HX HEENT  ? SEPTOPLASTY     Current Outpatient Prescriptions   Medication Sig Dispense Refill    pneumococcal 13 teresa conj dip (PREVNAR-13) 0.5 mL syrg injection 0.5 mL by IntraMUSCular route once for 1 dose. Indications: PREVENTION OF STREPTOCOCCUS PNEUMONIAE INFECTION 0.5 mL 0    ACCU-CHEK COMPACT PLUS TEST strp USE TO TEST BLOOD SUGAR 4 TIMES DAILY. DX E11.9 357 Strip 3    magnesium oxide (MAG-OX) 400 mg tablet TAKE 1 TABLET BY MOUTH EVERY DAY 30 Tab 0    metFORMIN ER (GLUCOPHAGE XR) 500 mg tablet TAKE 4 TABLETS BY MOUTH EVERY DAY  3    losartan-hydroCHLOROthiazide (HYZAAR) 50-12.5 mg per tablet TAKE 1 TAB BY MOUTH TWO (2) TIMES A DAY. 180 Tab 0    levothyroxine (SYNTHROID) 25 mcg tablet TAKE 1 TABLET BY MOUTH EVERY DAY BEFORE BREAKFAST 90 Tab 0    metoprolol tartrate (LOPRESSOR) 50 mg tablet TAKE 1 TAB BY MOUTH DAILY. 90 Tab 1    losartan (COZAAR) 25 mg tablet TAKE 1 TABLET BY MOUTH EVERY DAY 90 Tab 0    simvastatin (ZOCOR) 20 mg tablet Take 1 Tab by mouth nightly.  90 Tab 1    sertraline (ZOLOFT) 100 mg tablet TAKE 1 TAB BY MOUTH EVERY EVENING. 90 Tab 3    insulin glargine (LANTUS) 100 unit/mL injection 20 Units by SubCUTAneous route two (2) times a day. (Patient taking differently: 20 Units by SubCUTAneous route two (2) times a day. 44 in the evening) 1 Vial 3    HUMALOG KWIKPEN 100 unit/mL kwikpen   3    Cholecalciferol, Vitamin D3, (VITAMIN D3) 1,000 unit cap Take 1 Cap by mouth nightly. 30 Cap 3    Aspirin, Buffered 81 mg tab Take  by mouth daily.  omega-3 acid ethyl esters (LOVAZA) 1 gram capsule Take 2 g by mouth two (2) times a day.  glimepiride (AMARYL) 4 mg tablet Take  by mouth every morning.  Cetirizine (ZYRTEC) 10 mg cap Take  by mouth as needed. Allergies   Allergen Reactions    Adhesive Rash and Itching    Advil [Ibuprofen] Swelling     FEET SWELL    Ceftin [Cefuroxime Axetil] Itching    Lotensin [Benazepril] Cough    Penicillins Swelling    Sulfa (Sulfonamide Antibiotics) Rash, Itching and Swelling     Family History   Problem Relation Age of Onset    Heart Disease Mother     Heart Surgery Mother      VALVE REPLACED AT AGE 72    Heart Attack Father 46     MASSIVE     Anesth Problems Neg Hx      Social History   Substance Use Topics    Smoking status: Former Smoker     Packs/day: 1.00     Years: 5.00     Quit date: 12/20/1970    Smokeless tobacco: Never Used    Alcohol use Yes      Comment: \"VERY LITTLE ALCOHOL\"     Patient Active Problem List   Diagnosis Code    CAD (coronary artery disease) I25.10    Diabetes ([facility] [address] E11.9    Hypertension I10    Arthritis M19.90    Heart disease I51.9    Advanced care planning/counseling discussion Z71.89         Depression Risk Factor Screening:   Patient denies feelings of being down, depressed or hopeless at this time. Patient states that they have a strong support system within their family & friends.    PHQ over the last two weeks 7/21/2017   PHQ Not Done -   Little interest or pleasure in doing things Not at all   Feeling down, depressed or hopeless Not at all   Total Score PHQ 2 0     Alcohol Risk Factor Screening: On any occasion during the past 3 months, have you had more than 3 drinks containing alcohol? No    Do you average more than 7 drinks per week? No      Functional Ability and Level of Safety:     Hearing Loss   normal-to-mild    Activities of Daily Living   Self-care. Patient states that she lives with her  in a private residence. Patient states independence in all ADLs & denies the use of assistive devices for ambulation. NN encouraged patient to continue and/ or introduce routine physical exercise into their daily routine as applicable & as recommended by PCP. Patient verbalized understanding & agreement to take this into consideration. Patient shares that she wears a CPAP machine at night & she will provided PCP with an update today. Requires assistance with:   ADL Assessment 7/21/2017   Feeding yourself No Help Needed   Getting from bed to chair No Help Needed   Getting dressed No Help Needed   Bathing or showering No Help Needed   Walk across the room (includes cane/walker) No Help Needed   Using the telphone No Help Needed   Taking your medications No Help Needed   Preparing meals No Help Needed   Managing money (expenses/bills) No Help Needed   Moderately strenuous housework (laundry) No Help Needed   Shopping for personal items (toiletries/medicines) No Help Needed   Shopping for groceries No Help Needed   Driving No Help Needed   Climbing a flight of stairs No Help Needed   Getting to places beyond walking distances No Help Needed       Fall Risk   Fall Risk Assessment, last 12 mths 7/21/2017   Able to walk? Yes   Fall in past 12 months? No   Patient denies falls within the past year & verbalizes awareness of fall prevention strategies. Abuse Screen   Patient is not abused     Abuse Screening Questionnaire 7/21/2017   Do you ever feel afraid of your partner?  Coby Patel Are you in a relationship with someone who physically or mentally threatens you? N   Is it safe for you to go home? Y       Review of Systems   Medicare Wellness Visit    Physical Examination     No exam data present    Evaluation of Cognitive Function:  Mood/affect:  happy  Appearance: age appropriate and casually dressed  Family member/caregiver input: None present; however, patient reports a strong support system. No exam performed today, Medicare Wellness Visit. Patient Care Team:  Guido Elizondo MD as PCP - General (Internal Medicine)  Cecilio Braun RN as Ambulatory Care Navigator (Internal Medicine)    Advice/Referrals/Counseling   Education and counseling provided:  End-of-Life planning (with patient's consent)  Pneumococcal Vaccine  Influenza Vaccine  Screening Mammography  Screening Pap and pelvic (covered once every 2 years)  Colorectal cancer screening tests  Bone mass measurement (DEXA)  Screening for glaucoma  tdap & shingles vaccinations      Assessment/Plan   1. Patient states that a completed Advanced Medical Directive is at home. NN encouraged patient to bring a copy of the completed Advanced Medical Directive to the office for scanning into the medical record. Patient verbalized understanding & agreement. 2. Patient is up to date on the following immunizations:  Immunization History   Administered Date(s) Administered    Influenza High Dose Vaccine PF 09/22/2015, 10/01/2016    Pneumococcal Conjugate (PCV-13) 10/17/2016    Pneumococcal Polysaccharide (PPSV-23) 11/30/2010    Tdap 01/15/2015    Zoster Vaccine, Live 07/27/2011   Patient confirmed the aforementioned preventative immunization dates are correct. Patient's health maintenance immunization record has been updated & is current. 3. Patient states that she follows the PCP's recommendations for the following screenings: Mammography (report on file from 4/2017), pap/ pelvic, DEXA scan & colonoscopy.  Patient states that she had a screening dexa scan completed about 3 years ago at the [facility]. [facility] faxed requesting a copy of patient's last screening dexa scan report with patient's verbal approval. Patient reports having a screening colonoscopy completed in 2013 at [facility] with normal results & recommended screening follow-up in 10 years. [facility] faxed requesting a copy of patient's last colonoscopy screening report with patient's verbal approval.     4. Patient was not wearing corrective lenses. Patient reports having a routine eye exam & glaucoma screening within the last year performed by Dr. Kelsea Watts at [facility]. [facility] faxed requesting a copy of patient's last eye exam with glaucoma screening with patient's verbal approval.     Patient verbalized understanding of all information discussed. Patient was given the opportunity to ask questions. Medication reconciliation completed by MA/ LPN and reviewed by PCP. Patient provided AVS, either a printed version or electronic version in [employer], which includes Medicare Wellness Preventative Screening Table.

## 2017-07-21 NOTE — PATIENT INSTRUCTIONS
Medicare Part B Preventive Services Guidelines/Limitations Date last completed and Frequency Due Date   Bone Mass Measurement  (age 72 & older, biennial) Requires diagnosis related to osteoporosis or estrogen deficiency. Biennial benefit unless patient has history of long-term glucocorticoid tx or baseline is needed because initial test was by other method Completed about 3 years ago at [address] every 2 years As recommended by your PCP or Specialist     Cardiovascular Screening Blood Tests (every 5 years)  Total cholesterol, HDL, Triglycerides Order as a panel if possible Completed 4/2017    As recommended by your PCP As recommended by your PCP or Specialist   Colorectal Cancer Screening  -Fecal occult blood test (annual)  -Flexible sigmoidoscopy (5y)  -Screening colonoscopy (10y)  -Barium Enema Age 49-80; After age [de-identified] if history of abnormal results Completed 2013 at [address] every 5 to 10 years  As recommended by your PCP or Specialist     Counseling to Prevent Tobacco Use (up to 8 sessions per year)  - Counseling greater than 3 and up to 10 minutes  - Counseling greater than 10 minutes Patients must be asymptomatic of tobacco-related conditions to receive as preventive service N/A N/A   Diabetes Screening Tests (at least every 3 years, Medicare covers annually or at 6-month intervals for prediabetic patients)    Fasting blood sugar (FBS) or glucose tolerance test (GTT) Patient must be diagnosed with one of the following:  -Hypertension, Dyslipidemia, obesity, previous impaired FBS or GTT  Or any two of the following: overweight, FH of diabetes, age ? 72, history of gestational diabetes, birth of baby weighing more than 9 pounds Completed 4/2016    Recommended every 3-6 months for Pre-Diabetics and Diabetics As recommended by your PCP or Specialist     Glaucoma Screening (no USPSTF recommendation) Diabetes mellitus, family history, , age 48 or over,  American, age 72 or over Completed within the last year at [address] annually As recommended by your PCP or Specialist   Seasonal Influenza Vaccination (annually)  Completed 10/2016    Recommended Annually Due Fall 2017   TDAP Vaccination  Completed 1/2015    Recommended every 10 years As recommended by your PCP or Specialist   Zoster (Shingles) Vaccination Covered by Medicare Part D through the pharmacy- PCP provides prescription Completed 7/2011    Recommended once over age 48  Complete   Pneumococcal Vaccination (once after 72)  Pneumo 23- 11/2010  Recommended once over the age of 72    Prevnar 15- 10/2016 Recommended once over the age of 72 Complete        Complete   Screening Mammography (biennial age 54-69) Annually (age 36 or over) Completed 4/2017   As recommended by your PCP or Specialist     Screening Pap Tests and Pelvic Examination (up to age 79 and after 79 if unknown history or abnormal study last 8 years) Every 25 months except high risk As recommended by your PCP or Specialist   As recommended by your PCP or Specialist     Ultrasound Screening for Abdominal Aortic Aneurysm (AAA) (once) Patient must be referred through IPPE and not have had a screening for abdominal aortic aneurysm before under Medicare. Limited to patients who meet one of the following criteria:  - Men who are 73-68 years old and have smoked more than 100 cigarettes in their lifetime.  -Anyone with a FH of AAA  -Anyone recommended for screening by USPSTF Not indicated unless recommended by PCP   Not indicated unless recommended by PCP     Family Practice Management 2011    Please bring a copy of your completed advance medical directive to the office so it may be added to your medical record. Thank you. If you have any questions or concerns please feel free to contact me at [phone]. It was a pleasure meeting you today and participating in your healthcare.   Judith Richardson RN

## 2017-07-21 NOTE — PROGRESS NOTES
Sherlyn Burkett is a 67 y.o. female and presents with Medication Evaluation  . diarrhea  Improved  Noted hypomagnesia and wants to know if needs to continue magnesium    Diabetes  BS have been labile but have stabilized  She was seen by dr. Muna Art and has restarted metformin       Subjective:   Sherlyn Burkett is a 67 y.o. female with hypertension. Hypertension ROS: taking medications as instructed, no medication side effects noted, no TIA's, no chest pain on exertion, no dyspnea on exertion, no swelling of ankles. New concerns she never decreased her losartan /hctz and denied lightheadedness. Labs reviewed with pt        Sleep apnea  Saw Dr. Suki Eller, pt is on CPAP machine  She had pressure changed but now has settled into new change  Sleeping 6-7 hours/night now     SUBJECTIVE: Sherlyn Burkett is a 67 y.o. female here for follow up of hypothyroidism. Lab Results   Component Value Date/Time    TSH 3.720 04/06/2017 09:29 AM     Thyroid ROS: denies fatigue, weight changes, heat/cold intolerance, bowel/skin changes or CVS symptoms. Review of Systems  Constitutional: negative for fevers, chills, anorexia and weight loss  Eyes:   negative for visual disturbance and irritation  ENT:   negative for tinnitus,sore throat,nasal congestion,ear pains. hoarseness  Respiratory:  negative for cough, hemoptysis, dyspnea,wheezing  CV:   negative for chest pain, palpitations, lower extremity edema  GI:   negative for nausea, vomiting, diarrhea, abdominal pain,melena  Endo:               negative for polyuria,polydipsia,polyphagia,heat intolerance  Genitourinary: negative for frequency, dysuria and hematuria  Integument:  negative for rash and pruritus  Hematologic:  negative for easy bruising and gum/nose bleeding  Musculoskel: negative for myalgias, arthralgias, back pain, muscle weakness, joint pain  Neurological:  negative for headaches, dizziness, vertigo, memory problems and gait   Behavl/Psych: negative for feelings of anxiety, depression, mood changes    Past Medical History:   Diagnosis Date    Arthritis     CAD (coronary artery disease) 2006    STENT PLACED>PLAQUE BROKE OFF--HAD \"MINOR MI\"    Deviated septum 13    HAS TROUBLE BREATHING THROUGH LEFT SIDE; CT SHOWED POLYP    Diabetes ([address]     Heart disease     Hypertension     Incontinence     Nasal polyp     Unspecified sleep apnea 13    CAN'T TOLERATE CPAP     Past Surgical History:   Procedure Laterality Date    CARDIAC SURG PROCEDURE UNLIST      CARDIAC CATH;ANGIOPLASTY WITH STENT    HX  SECTION      X3    HX COLONOSCOPY      HX HEENT  ? SEPTOPLASTY     Social History     Social History    Marital status:      Spouse name: N/A    Number of children: N/A    Years of education: N/A     Social History Main Topics    Smoking status: Former Smoker     Packs/day: 1.00     Years: 5.00     Quit date: 1970    Smokeless tobacco: Never Used    Alcohol use Yes      Comment: \"VERY LITTLE ALCOHOL\"    Drug use: No    Sexual activity: Not Asked     Other Topics Concern    None     Social History Narrative            3 children women 36, 40, 40 healthy other than skin issues     Family History   Problem Relation Age of Onset    Heart Disease Mother     Heart Surgery Mother      VALVE REPLACED AT AGE 72    Heart Attack Father 46     MASSIVE     Anesth Problems Neg Hx      Current Outpatient Prescriptions   Medication Sig Dispense Refill    ACCU-CHEK COMPACT PLUS TEST strp USE TO TEST BLOOD SUGAR 4 TIMES DAILY. DX E11.9 357 Strip 3    magnesium oxide (MAG-OX) 400 mg tablet TAKE 1 TABLET BY MOUTH EVERY DAY 30 Tab 0    metFORMIN ER (GLUCOPHAGE XR) 500 mg tablet TAKE 4 TABLETS BY MOUTH EVERY DAY  3    losartan-hydroCHLOROthiazide (HYZAAR) 50-12.5 mg per tablet TAKE 1 TAB BY MOUTH TWO (2) TIMES A DAY.  180 Tab 0    levothyroxine (SYNTHROID) 25 mcg tablet TAKE 1 TABLET BY MOUTH EVERY DAY BEFORE BREAKFAST 90 Tab 0    metoprolol tartrate (LOPRESSOR) 50 mg tablet TAKE 1 TAB BY MOUTH DAILY. 90 Tab 1    losartan (COZAAR) 25 mg tablet TAKE 1 TABLET BY MOUTH EVERY DAY 90 Tab 0    simvastatin (ZOCOR) 20 mg tablet Take 1 Tab by mouth nightly. 90 Tab 1    sertraline (ZOLOFT) 100 mg tablet TAKE 1 TAB BY MOUTH EVERY EVENING. 90 Tab 3    insulin glargine (LANTUS) 100 unit/mL injection 20 Units by SubCUTAneous route two (2) times a day. (Patient taking differently: 20 Units by SubCUTAneous route two (2) times a day. 44 in the evening) 1 Vial 3    HUMALOG KWIKPEN 100 unit/mL kwikpen   3    Cholecalciferol, Vitamin D3, (VITAMIN D3) 1,000 unit cap Take 1 Cap by mouth nightly. 30 Cap 3    Aspirin, Buffered 81 mg tab Take  by mouth daily.  omega-3 acid ethyl esters (LOVAZA) 1 gram capsule Take 2 g by mouth two (2) times a day.  glimepiride (AMARYL) 4 mg tablet Take  by mouth every morning.  Cetirizine (ZYRTEC) 10 mg cap Take  by mouth as needed.        Allergies   Allergen Reactions    Adhesive Rash and Itching    Advil [Ibuprofen] Swelling     FEET SWELL    Ceftin [Cefuroxime Axetil] Itching    Lotensin [Benazepril] Cough    Penicillins Swelling    Sulfa (Sulfonamide Antibiotics) Rash, Itching and Swelling       Objective:  Visit Vitals    /55 (BP 1 Location: Left arm, BP Patient Position: Sitting)    Pulse (!) 50    Temp 97.9 °F (36.6 °C) (Oral)    Resp 14    Ht 5' 2\" (1.575 m)    Wt 198 lb 6.4 oz (90 kg)    SpO2 97%    BMI 36.29 kg/m2     Physical Exam:   General appearance - alert, well appearing, and in no distress  Mental status - alert, oriented to person, place, and time  EYE-SHARAD, EOMI, corneas normal, no foreign bodies, visual acuity normal both eyes, no periorbital cellulitis  ENT-ENT exam normal, no neck nodes or sinus tenderness  Nose - normal and patent, no erythema, discharge or polyps  Mouth - mucous membranes moist, pharynx normal without lesions  Neck - supple, no significant adenopathy Chest - clear to auscultation, no wheezes, rales or rhonchi, symmetric air entry   Heart - normal rate, regular rhythm, normal S1, S2, no murmurs, rubs, clicks or gallops   Abdomen - soft, nontender, nondistended, no masses or organomegaly, obese, no rebound  Lymph- no adenopathy palpable  Ext-peripheral pulses normal, no pedal edema, no clubbing or cyanosis, + 1 edema  Skin-Warm and dry. no hyperpigmentation, vitiligo, or suspicious lesions, scattered resolved superifical skin bites, no erythema  Neuro -alert, oriented, normal speech, no focal findings or movement disorder noted  Neck-normal C-spine, no tenderness, full ROM without pain      Results for orders placed or performed during the hospital encounter of 06/18/17   CBC WITH AUTOMATED DIFF   Result Value Ref Range    WBC 10.9 3.6 - 11.0 K/uL    RBC 4.53 3.80 - 5.20 M/uL    HGB 13.7 11.5 - 16.0 g/dL    HCT 39.2 35.0 - 47.0 %    MCV 86.5 80.0 - 99.0 FL    MCH 30.2 26.0 - 34.0 PG    MCHC 34.9 30.0 - 36.5 g/dL    RDW 13.2 11.5 - 14.5 %    PLATELET 711 443 - 767 K/uL    NEUTROPHILS 64 32 - 75 %    LYMPHOCYTES 28 12 - 49 %    MONOCYTES 8 5 - 13 %    EOSINOPHILS 0 0 - 7 %    BASOPHILS 0 0 - 1 %    ABS. NEUTROPHILS 6.9 1.8 - 8.0 K/UL    ABS. LYMPHOCYTES 3.1 0.8 - 3.5 K/UL    ABS. MONOCYTES 0.9 0.0 - 1.0 K/UL    ABS. EOSINOPHILS 0.0 0.0 - 0.4 K/UL    ABS.  BASOPHILS 0.0 0.0 - 0.1 K/UL    DF SMEAR SCANNED      RBC COMMENTS NORMOCYTIC, NORMOCHROMIC     METABOLIC PANEL, COMPREHENSIVE   Result Value Ref Range    Sodium 138 136 - 145 mmol/L    Potassium 3.6 3.5 - 5.1 mmol/L    Chloride 103 97 - 108 mmol/L    CO2 25 21 - 32 mmol/L    Anion gap 10 5 - 15 mmol/L    Glucose 115 (H) 65 - 100 mg/dL    BUN 22 (H) 6 - 20 MG/DL    Creatinine 1.10 (H) 0.55 - 1.02 MG/DL    BUN/Creatinine ratio 20 12 - 20      GFR est AA 59 (L) >60 ml/min/1.73m2    GFR est non-AA 49 (L) >60 ml/min/1.73m2    Calcium 9.3 8.5 - 10.1 MG/DL    Bilirubin, total 0.6 0.2 - 1.0 MG/DL    ALT (SGPT) 40 12 - 78 U/L    AST (SGOT) 27 15 - 37 U/L    Alk. phosphatase 93 45 - 117 U/L    Protein, total 8.0 6.4 - 8.2 g/dL    Albumin 3.9 3.5 - 5.0 g/dL    Globulin 4.1 (H) 2.0 - 4.0 g/dL    A-G Ratio 1.0 (L) 1.1 - 2.2     LIPASE   Result Value Ref Range    Lipase 160 73 - 393 U/L   URINALYSIS W/ RFLX MICROSCOPIC   Result Value Ref Range    Color YELLOW/STRAW      Appearance CLEAR CLEAR      Specific gravity 1.022 1.003 - 1.030      pH (UA) 6.0 5.0 - 8.0      Protein 300 (A) NEG mg/dL    Glucose NEGATIVE  NEG mg/dL    Ketone NEGATIVE  NEG mg/dL    Bilirubin NEGATIVE  NEG      Blood TRACE (A) NEG      Urobilinogen 0.2 0.2 - 1.0 EU/dL    Nitrites NEGATIVE  NEG      Leukocyte Esterase NEGATIVE  NEG      WBC 0-4 0 - 4 /hpf    RBC 5-10 0 - 5 /hpf    Epithelial cells FEW FEW /lpf    Bacteria NEGATIVE  NEG /hpf    Hyaline cast 0-2 0 - 5 /lpf     Tish Alatorre was seen today for medication evaluation and annual wellness visit. Diagnoses and all orders for this visit:    Hypomagnesemia  Will recheck labs and replete if needed  Discussed importance /relationship with mag and potassium so need to ensure mag optimal level  No diarrhea  -     METABOLIC PANEL, COMPREHENSIVE  -     MAGNESIUM    Type 2 diabetes mellitus without complication, with long-term current use of insulin (HCC)  Cont meds  -     METABOLIC PANEL, COMPREHENSIVE    Essential hypertension  Cont meds  I disucssed with her re: increasing her losartan to 100 mg if she can tolerate due to nephropathy and renal protection  She will montor bp at home  -     METABOLIC PANEL, COMPREHENSIVE    Advanced care planning/counseling discussion    Other orders  -     pneumococcal 13 teresa conj dip (PREVNAR-13) 0.5 mL syrg injection; 0.5 mL by IntraMUSCular route once for 1 dose. Indications: PREVENTION OF STREPTOCOCCUS PNEUMONIAE INFECTION        This note will not be viewable in [employer]. Please see VIKAS Gastelum note re: medicare wellness visit.   Pt did not have any additional questions for me re: this medicare part of visit        This note will not be viewable in MyChart.

## 2017-07-21 NOTE — MR AVS SNAPSHOT
Visit Information Date & Time Provider Department Dept. Phone Encounter #  
 7/21/2017  9:30 AM Gilford Banning, MD [facility] [address] [id number] Your Appointments 8/21/2017 11:40 AM  
Any with Gillian Stokes MD  
[address] [facility] ([facility]) Appt Note: 1 month f/up bring machine Class Central [address] [address]  
  
   
 [address] [address] Upcoming Health Maintenance Date Due  
 FOOT EXAM Q1 6/20/1955 EYE EXAM RETINAL OR DILATED Q1 6[birth year]20/1955 FOBT Q 1 YEAR AGE 50-75 6/20/1995 GLAUCOMA SCREENING Q2Y 6/20/2010 OSTEOPOROSIS SCREENING (DEXA) 6/20/2010 MEDICARE YEARLY EXAM 6/20/2010 Pneumococcal 65+ High/Highest Risk (2 of 2 - PCV13) 11/30/2011 HEMOGLOBIN A1C Q6M 10/27/2016 INFLUENZA AGE 9 TO ADULT 8/1/2017 MICROALBUMIN Q1 4/6/2018 LIPID PANEL Q1 4/6/2018 BREAST CANCER SCRN MAMMOGRAM 4/19/2019 DTaP/Tdap/Td series (2 - Td) 1/15/2025 Allergies as of 7/21/2017  Review Complete On: 7/21/2017 By: Gilford Banning, MD  
  
 Severity Noted Reaction Type Reactions Adhesive  12/20/2013    Rash, Itching Advil [Ibuprofen]  12/20/2013    Swelling FEET SWELL Ceftin [Cefuroxime Axetil]  12/20/2013    Itching Lotensin [Benazepril]  12/20/2013    Cough Penicillins  12/20/2013    Swelling Sulfa (Sulfonamide Antibiotics)  12/20/2013    Rash, Itching, Swelling Current Immunizations  Never Reviewed Name Date Influenza High Dose Vaccine PF 10/1/2016, 9/22/2015 Pneumococcal Polysaccharide (PPSV-23) 11/30/2010 Tdap 1/15/2015 Zoster Vaccine, Live 7/27/2011 Not reviewed this visit You Were Diagnosed With   
  
 Codes Comments Hypomagnesemia    -  Primary ICD-10-CM: O85.44 
ICD-9-CM: 275.2  Type 2 diabetes mellitus without complication, with long-term current use of insulin ([facility]     ICD-10-CM: E11.9, Z79.4 ICD-9-CM: 250.00, V58.67 Essential hypertension     ICD-10-CM: I10 
ICD-9-CM: 401.9 Vitals BP Pulse Temp Resp Height(growth percentile) Weight(growth percentile) 128/55 (BP 1 Location: Left arm, BP Patient Position: Sitting) (!) 50 97.9 °F (36.6 °C) (Oral) 14 5' 2\" (1.575 m) 198 lb 6.4 oz (90 kg) SpO2 BMI OB Status Smoking Status 97% 36.29 kg/m2 Postmenopausal Former Smoker Vitals History BMI and BSA Data Body Mass Index Body Surface Area  
 36.29 kg/m 2 1.98 m 2 Preferred Pharmacy Pharmacy Name Phone [id number] [place], [address] [phone] Your Updated Medication List  
  
   
This list is accurate as of: 7/21/17 10:24 AM.  Always use your most recent med list.  
  
  
  
  
 ACCU-CHEK COMPACT PLUS TEST Strp Generic drug:  Blood Sugar Diagnostic, Drum USE TO TEST BLOOD SUGAR 4 TIMES DAILY. DX E11.9  
  
 aspirin, buffered 81 mg Tab Take  by mouth daily. cholecalciferol 1,000 unit Cap Commonly known as:  VITAMIN D3 Take 1 Cap by mouth nightly. glimepiride 4 mg tablet Commonly known as:  AMARYL Take  by mouth every morning. HumaLOG KwikPen 100 unit/mL kwikpen Generic drug:  insulin lispro  
  
 insulin glargine 100 unit/mL injection Commonly known as:  LANTUS  
20 Units by SubCUTAneous route two (2) times a day. levothyroxine 25 mcg tablet Commonly known as:  SYNTHROID  
TAKE 1 TABLET BY MOUTH EVERY DAY BEFORE BREAKFAST  
  
 losartan 25 mg tablet Commonly known as:  COZAAR  
TAKE 1 TABLET BY MOUTH EVERY DAY  
  
 losartan-hydroCHLOROthiazide 50-12.5 mg per tablet Commonly known as:  HYZAAR  
TAKE 1 TAB BY MOUTH TWO (2) TIMES A DAY. LOVAZA 1 gram capsule Generic drug:  omega-3 acid ethyl esters Take 2 g by mouth two (2) times a day. magnesium oxide 400 mg tablet Commonly known as:  MAG-OX  
TAKE 1 TABLET BY MOUTH EVERY DAY  
  
 metFORMIN  mg tablet Commonly known as:  GLUCOPHAGE XR  
TAKE 4 TABLETS BY MOUTH EVERY DAY  
  
 metoprolol tartrate 50 mg tablet Commonly known as:  LOPRESSOR  
TAKE 1 TAB BY MOUTH DAILY. pneumococcal 13 teresa conj dip 0.5 mL Syrg injection Commonly known as:  PREVNAR-13  
0.5 mL by IntraMUSCular route once for 1 dose. Indications: PREVENTION OF STREPTOCOCCUS PNEUMONIAE INFECTION  
  
 sertraline 100 mg tablet Commonly known as:  ZOLOFT  
TAKE 1 TAB BY MOUTH EVERY EVENING. simvastatin 20 mg tablet Commonly known as:  ZOCOR Take 1 Tab by mouth nightly. ZyrTEC 10 mg Cap Generic drug:  Cetirizine Take  by mouth as needed. Prescriptions Printed Refills  
 pneumococcal 13 teresa conj dip (PREVNAR-13) 0.5 mL syrg injection 0 Si.5 mL by IntraMUSCular route once for 1 dose. Indications: PREVENTION OF STREPTOCOCCUS PNEUMONIAE INFECTION Class: Print Route: IntraMUSCular We Performed the Following MAGNESIUM [id number] CPT(R)] METABOLIC PANEL, COMPREHENSIVE [85694 CPT(R)] Introducing [facility]! Dear Lucio Harp: Thank you for requesting a [employer] account. Our records indicate that you already have an active [employer] account. You can access your account anytime at [email or web address] Did you know that you can access your hospital and ER discharge instructions at any time in [employer]? You can also review all of your test results from your hospital stay or ER visit. Additional Information If you have questions, please visit the Frequently Asked Questions section of the [employer] website at [email or web address] Remember, [employer] is NOT to be used for urgent needs. For medical emergencies, dial 911. Now available from your iPhone and Android! Please provide this summary of care documentation to your next provider. Your primary care clinician is listed as Laura Vargas. If you have any questions after today's visit, please call [phone].

## 2017-07-22 LAB
ALBUMIN SERPL-MCNC: 4.6 G/DL (ref 3.5–4.8)
ALBUMIN/GLOB SERPL: 1.9 {RATIO} (ref 1.2–2.2)
ALP SERPL-CCNC: 80 IU/L (ref 39–117)
ALT SERPL-CCNC: 22 IU/L (ref 0–32)
AST SERPL-CCNC: 23 IU/L (ref 0–40)
BILIRUB SERPL-MCNC: 0.5 MG/DL (ref 0–1.2)
BUN SERPL-MCNC: 27 MG/DL (ref 8–27)
BUN/CREAT SERPL: 29 (ref 12–28)
CALCIUM SERPL-MCNC: 9.9 MG/DL (ref 8.7–10.3)
CHLORIDE SERPL-SCNC: 98 MMOL/L (ref 96–106)
CO2 SERPL-SCNC: 20 MMOL/L (ref 18–29)
CREAT SERPL-MCNC: 0.94 MG/DL (ref 0.57–1)
GLOBULIN SER CALC-MCNC: 2.4 G/DL (ref 1.5–4.5)
GLUCOSE SERPL-MCNC: 96 MG/DL (ref 65–99)
MAGNESIUM SERPL-MCNC: 1.6 MG/DL (ref 1.6–2.3)
POTASSIUM SERPL-SCNC: 4.5 MMOL/L (ref 3.5–5.2)
PROT SERPL-MCNC: 7 G/DL (ref 6–8.5)
SODIUM SERPL-SCNC: 139 MMOL/L (ref 134–144)

## 2017-10-16 ENCOUNTER — OFFICE VISIT (OUTPATIENT)
Dept: SLEEP MEDICINE | Age: 72
End: 2017-10-16

## 2017-10-16 VITALS
OXYGEN SATURATION: 95 % | HEART RATE: 57 BPM | HEIGHT: 62 IN | SYSTOLIC BLOOD PRESSURE: 133 MMHG | DIASTOLIC BLOOD PRESSURE: 72 MMHG | BODY MASS INDEX: 37.45 KG/M2 | WEIGHT: 203.5 LBS

## 2017-10-16 DIAGNOSIS — I10 ESSENTIAL HYPERTENSION WITH GOAL BLOOD PRESSURE LESS THAN 130/80: ICD-10-CM

## 2017-10-16 DIAGNOSIS — G47.33 OBSTRUCTIVE SLEEP APNEA SYNDROME: Primary | ICD-10-CM

## 2017-10-16 NOTE — PATIENT INSTRUCTIONS
[address] [facility] [place]. [place], [address]  Tel.  [phone]  Fax. 100 [facility] 60  [place], [address]  Tel.  [phone]  Fax. [phone] [address]  Tel.  [phone]  Fax. [phone]     PROPER SLEEP HYGIENE    What to avoid  · Do not have drinks with caffeine, such as coffee or black tea, for 8 hours before bed. · Do not smoke or use other types of tobacco near bedtime. Nicotine is a stimulant and can keep you awake. · Avoid drinking alcohol late in the evening, because it can cause you to wake in the middle of the night. · Do not eat a big meal close to bedtime. If you are hungry, eat a light snack. · Do not drink a lot of water close to bedtime, because the need to urinate may wake you up during the night. · Do not read or watch TV in bed. Use the bed only for sleeping and sexual activity. What to try  · Go to bed at the same time every night, and wake up at the same time every morning. Do not take naps during the day. · Keep your bedroom quiet, dark, and cool. · Get regular exercise, but not within 3 to 4 hours of your bedtime. .  · Sleep on a comfortable pillow and mattress. · If watching the clock makes you anxious, turn it facing away from you so you cannot see the time. · If you worry when you lie down, start a worry book. Well before bedtime, write down your worries, and then set the book and your concerns aside. · Try meditation or other relaxation techniques before you go to bed. · If you cannot fall asleep, get up and go to another room until you feel sleepy. Do something relaxing. Repeat your bedtime routine before you go to bed again. · Make your house quiet and calm about an hour before bedtime. Turn down the lights, turn off the TV, log off the computer, and turn down the volume on music. This can help you relax after a busy day.     Drowsy Driving  The [address] [place] [address] Road Traffic Safety Administration cites drowsiness as a causing factor in more than 908,373 police reported crashes annually, resulting in 76,000 injuries and 1,500 deaths. Other surveys suggest 55% of people polled have driven while drowsy in the past year, 23% had fallen asleep but not crashed, 3% crashed, and 2% had and accident due to drowsy driving. Who is at risk? Young Drivers: One study of drowsy driving accidents states that 55% of the drivers were under 25 years. Of those, 75% were male. Shift Workers and Travelers: People who work overnight or travel across time zones frequently are at higher risk of experiencing Circadian Rhythm Disorders. They are trying to work and function when their body is programed to sleep. Sleep Deprived: Lack of sleep has a serious impact on your ability to pay attention or focus on a task. Consistently getting less than the average of 8 hours your body needs creates partial or cumulative sleep deprivation. Untreated Sleep Disorders: Sleep Apnea, Narcolepsy, R.L.S., and other sleep disorders (untreated) prevent a person from getting enough restful sleep. This leads to excessive daytime sleepiness and increases the risk for drowsy driving accidents by up to 7 times. Medications / Alcohol: Even over the counter medications can cause drowsiness. Medications that impair a drivers attention should have a warning label. Alcohol naturally makes you sleepy and on its own can cause accidents. Combined with excessive drowsiness its effects are amplified. Signs of Drowsy Driving:   * You don't remember driving the last few miles   * You may drift out of your jerry   * You are unable to focus and your thoughts wander   * You may yawn more often than normal   * You have difficulty keeping your eyes open / nodding off   * Missing traffic signs, speeding, or tailgating  Prevention-   Good sleep hygiene, lifestyle and behavioral choices have the most impact on drowsy driving.  There is no substitute for sleep and the average person requires 8 hours nightly. If you find yourself driving drowsy, stop and sleep. Consider the sleep hygiene tips provided during your visit as well. Medication Refill Policy: Refills for all medications require 1 week advance notice. Please have your pharmacy fax a refill request. We are unable to fax, or call in \"controled substance\" medications and you will need to pick these prescriptions up from our office. [employer] Activation    Thank you for requesting access to [employer]. Please follow the instructions below to securely access and download your online medical record. [employer] allows you to send messages to your doctor, view your test results, renew your prescriptions, schedule appointments, and more. How Do I Sign Up? 1. In your internet browser, go to [email or web address]. 2. Click on the First Time User? Click Here link in the Sign In box. You will see the New Member Sign Up page. 3. Enter your [employer] Access Code exactly as it appears below. You will not need to use this code after youve completed the sign-up process. If you do not sign up before the expiration date, you must request a new code. [employer] Access Code: Activation code not generated  Current [employer] Status: Active (This is the date your [employer] access code will )    4. Enter the last four digits of your Social Security Number (xxxx) and Date of Birth (mm/dd/yyyy) as indicated and click Submit. You will be taken to the next sign-up page. 5. Create a [employer] ID. This will be your [employer] login ID and cannot be changed, so think of one that is secure and easy to remember. 6. Create a [employer] password. You can change your password at any time. 7. Enter your Password Reset Question and Answer. This can be used at a later time if you forget your password. 8. Enter your e-mail address. You will receive e-mail notification when new information is available in [address]   9. Click Sign Up. You can now view and download portions of your medical record. 10. Click the Download Summary menu link to download a portable copy of your medical information. Additional Information    If you have questions, please call [phone]. Remember, [employer] is NOT to be used for urgent needs. For medical emergencies, dial 911.

## 2017-10-16 NOTE — PROGRESS NOTES
[address] [facility] [place]. [place], [address]  Tel.  [phone]  Fax. 100 [facility] [address]  [place], [address] [facility]  Tel.  [phone]  Fax. [phone] [address]   Tel.  [phone]  Fax. [phone]     S>Quentin Madison is a 67 y.o. female seen for a positive airway pressure follow-up. She reports no problems using the device. The following problems are identified:    Drowsiness no Problems exhaling no   Snoring no Forget to put on no   Mask Comfortable yes Can't fall asleep no   Dry Mouth no Mask falls off no   Air Leaking no Frequent awakenings no     Download reviewed. She admits that her sleep has improved. Therapy Apnea Index averaged over PAP use: 3 /hr which reflects improved sleep breathing condition. Allergies   Allergen Reactions    Adhesive Rash and Itching    Advil [Ibuprofen] Swelling     FEET SWELL    Ceftin [Cefuroxime Axetil] Itching    Lotensin [Benazepril] Cough    Penicillins Swelling    Sulfa (Sulfonamide Antibiotics) Rash, Itching and Swelling       She has a current medication list which includes the following prescription(s): losartan-hydrochlorothiazide, levothyroxine, losartan, accu-chek compact plus test, magnesium oxide, metformin er, metoprolol tartrate, simvastatin, sertraline, insulin glargine, humalog kwikpen, cholecalciferol, aspirin, buffered, omega-3 acid ethyl esters, glimepiride, and cetirizine. .      She  has a past medical history of Arthritis; CAD (coronary artery disease) (2006); Deviated septum (12/20/13); Diabetes ([facility] [address] Heart disease; Hypertension; Incontinence; Nasal polyp; and Unspecified sleep apnea (12/20/13). [facility] Sleepiness Score: 11   and Modified F.O.S.Q. Score Total / 2: 17   which reflect improved sleep quality over therapy time.     O>    Visit Vitals    /72    Pulse (!) 57    Ht 5' 2\" (1.575 m)    Wt 203 lb 8 oz (92.3 kg)    SpO2 95%    BMI 37.22 kg/m2 General:   Alert, oriented, not in distress   Neck:   No JVD    Chest/Lungs:  symetrical lung expansion , no accessory muscle use    Extremities:  no obvious rashes , negative edema    Neuro:  No focal deficits ; No obvious tremor    Psych:  Normal affect ,  Normal countenance ;           A>    ICD-10-CM ICD-9-CM    1. Obstructive sleep apnea syndrome G47.33 327.23 AMB SUPPLY ORDER   2. Essential hypertension with goal blood pressure less than 130/80 I10 401.9      AH = 19(3-16). On CPAP :  5-10 cmH2O. Compliant:      yes    Therapeutic Response:  Positive    P>      * Follow-up Disposition:  Return in about 1 year (around 10/16/2018). I have ordered replacement supplies  she will continue on her current pressure settings. * She was asked to contact our office for any problems regarding PAP therapy. * Counseling was provided regarding the importance of regular PAP use and on proper sleep hygiene and safe driving. * Re-enforced proper and regular cleaning for the device. I have counseled the patient regarding the benefits of weight loss. 2.Hypertension - she continues on her current regimen. I have reviewed the relationship between hypertension as it relates to sleep-disordered breathing.      Tena Aguilar MD  Diplomate in Sleep Medicine  [facility]

## 2017-10-20 ENCOUNTER — DOCUMENTATION ONLY (OUTPATIENT)
Dept: SLEEP MEDICINE | Age: 72
End: 2017-10-20

## 2017-10-26 ENCOUNTER — OFFICE VISIT (OUTPATIENT)
Dept: INTERNAL MEDICINE CLINIC | Age: 72
End: 2017-10-26

## 2017-10-26 VITALS
TEMPERATURE: 97.9 F | RESPIRATION RATE: 12 BRPM | WEIGHT: 205 LBS | HEART RATE: 58 BPM | OXYGEN SATURATION: 96 % | DIASTOLIC BLOOD PRESSURE: 58 MMHG | HEIGHT: 62 IN | SYSTOLIC BLOOD PRESSURE: 109 MMHG | BODY MASS INDEX: 37.73 KG/M2

## 2017-10-26 DIAGNOSIS — F51.01 PRIMARY INSOMNIA: ICD-10-CM

## 2017-10-26 DIAGNOSIS — I10 ESSENTIAL HYPERTENSION: Primary | ICD-10-CM

## 2017-10-26 DIAGNOSIS — I25.10 CORONARY ARTERY DISEASE DUE TO LIPID RICH PLAQUE: ICD-10-CM

## 2017-10-26 DIAGNOSIS — I25.83 CORONARY ARTERY DISEASE DUE TO LIPID RICH PLAQUE: ICD-10-CM

## 2017-10-26 DIAGNOSIS — I10 ESSENTIAL HYPERTENSION: ICD-10-CM

## 2017-10-26 DIAGNOSIS — R60.0 LOCALIZED EDEMA: ICD-10-CM

## 2017-10-26 NOTE — PROGRESS NOTES
Pat Echeverria is a 67 y.o. female and presents with Follow-up (3 month follow up )  . Diabetes  BS have been labile but have stabilized  She was seen by dr. Sowmya Ordoñez. Pt reports her a1c was 7.0 at last check. This morning she felt like her bs was low with shaking in side and bs was 90 so ate something and sx better now  She has not had in the past 3 months. She did receive her high dose flu shot  She is taking 46 units Lantus in the evening      Subjective:   Pat Echeverria is a 67 y.o. female with hypertension. Hypertension ROS: taking medications as instructed, no medication side effects noted, no TIA's, no chest pain on exertion, no dyspnea on exertion, no swelling of ankles. New concerns she is tolerating her losartan 50/12.5 and losartan 25 mg    Sleep apnea  Saw Dr. Jose M Pat, pt is on CPAP machine  She had pressure changed but now has settled into new change  Sleeping 6-7 hours/night still. When she wakes she feels well rested but by afternoon she feels tired. She has trouble initiating sleep. She is worried because she has 3 children and 6 grandchildren. SUBJECTIVE: Pat Echeverria is a 67 y.o. female here for follow up of hypothyroidism. Lab Results   Component Value Date/Time    TSH 3.720 04/06/2017 09:29 AM     Thyroid ROS: denies fatigue, weight changes, heat/cold intolerance, bowel/skin changes or CVS symptoms.   7/10   She is not sleeping well. Fatigue in the afternoon  She reprots being on higher dose of sertaline in the past. She thinks she was 150 mg in the past    CAD  She is going 3 times per week but prob 2 x/week  No cp or sob  No se of cholesterol medication    Review of Systems  Constitutional: negative for fevers, chills, anorexia and weight loss  Eyes:   negative for visual disturbance and irritation  ENT:   negative for tinnitus,sore throat,nasal congestion,ear pains. hoarseness  Respiratory:  negative for cough, hemoptysis, dyspnea,wheezing  CV:   negative for chest pain, palpitations, lower extremity edema  GI:   negative for nausea, vomiting, diarrhea, abdominal pain,melena  Endo:               negative for polyuria,polydipsia,polyphagia,heat intolerance  Genitourinary: negative for frequency, dysuria and hematuria  Integument:  negative for rash and pruritus  Hematologic:  negative for easy bruising and gum/nose bleeding  Musculoskel: negative for myalgias, arthralgias, back pain, muscle weakness, joint pain  Neurological:  negative for headaches, dizziness, vertigo, memory problems and gait   Behavl/Psych: negative for feelings of anxiety, depression, mood changes    Past Medical History:   Diagnosis Date    Arthritis     CAD (coronary artery disease) 2006    STENT PLACED>PLAQUE BROKE OFF--HAD \"MINOR MI\"    Deviated septum 13    HAS TROUBLE BREATHING THROUGH LEFT SIDE; CT SHOWED POLYP    Diabetes ([facility] [address]     Heart disease     Hypertension     Incontinence     Nasal polyp     Unspecified sleep apnea 13    CAN'T TOLERATE CPAP     Past Surgical History:   Procedure Laterality Date    CARDIAC SURG PROCEDURE UNLIST      CARDIAC CATH;ANGIOPLASTY WITH STENT    HX  SECTION      X3    HX COLONOSCOPY      HX HEENT  ?     SEPTOPLASTY     Social History     Social History    Marital status:      Spouse name: N/A    Number of children: N/A    Years of education: N/A     Social History Main Topics    Smoking status: Former Smoker     Packs/day: 1.00     Years: 5.00     Quit date: 1970    Smokeless tobacco: Never Used    Alcohol use Yes      Comment: \"VERY LITTLE ALCOHOL\"    Drug use: No    Sexual activity: Not Asked     Other Topics Concern    None     Social History Narrative            3 children women 36, 40, 37 healthy other than skin issues     Family History   Problem Relation Age of Onset    Heart Disease Mother     Heart Surgery Mother      VALVE REPLACED AT AGE 72    Heart Attack Father 46     MASSIVE     Anesth Problems Neg Hx      Current Outpatient Prescriptions   Medication Sig Dispense Refill    simvastatin (ZOCOR) 20 mg tablet TAKE 1 TABLET BY MOUTH EVERY DAY AT NIGHT 90 Tab 0    losartan-hydroCHLOROthiazide (HYZAAR) 50-12.5 mg per tablet TAKE 1 TAB BY MOUTH TWO (2) TIMES A DAY. 180 Tab 0    levothyroxine (SYNTHROID) 25 mcg tablet TAKE 1 TABLET BY MOUTH EVERY DAY BEFORE BREAKFAST 90 Tab 0    losartan (COZAAR) 25 mg tablet TAKE 1 TABLET BY MOUTH EVERY DAY 90 Tab 0    ACCU-CHEK COMPACT PLUS TEST strp USE TO TEST BLOOD SUGAR 4 TIMES DAILY. DX E11.9 357 Strip 3    magnesium oxide (MAG-OX) 400 mg tablet TAKE 1 TABLET BY MOUTH EVERY DAY 30 Tab 0    metFORMIN ER (GLUCOPHAGE XR) 500 mg tablet TAKE 4 TABLETS BY MOUTH EVERY DAY  3    metoprolol tartrate (LOPRESSOR) 50 mg tablet TAKE 1 TAB BY MOUTH DAILY. 90 Tab 1    sertraline (ZOLOFT) 100 mg tablet TAKE 1 TAB BY MOUTH EVERY EVENING. 90 Tab 3    insulin glargine (LANTUS) 100 unit/mL injection 20 Units by SubCUTAneous route two (2) times a day. (Patient taking differently: 46 Units by SubCUTAneous route two (2) times a day. 46 in the evening) 1 Vial 3    HUMALOG KWIKPEN 100 unit/mL kwikpen   3    Cholecalciferol, Vitamin D3, (VITAMIN D3) 1,000 unit cap Take 1 Cap by mouth nightly. 30 Cap 3    Aspirin, Buffered 81 mg tab Take  by mouth daily.  omega-3 acid ethyl esters (LOVAZA) 1 gram capsule Take 2 g by mouth two (2) times a day.  glimepiride (AMARYL) 4 mg tablet Take  by mouth every morning.  Cetirizine (ZYRTEC) 10 mg cap Take  by mouth as needed.        Allergies   Allergen Reactions    Adhesive Rash and Itching    Advil [Ibuprofen] Swelling     FEET SWELL    Ceftin [Cefuroxime Axetil] Itching    Lotensin [Benazepril] Cough    Penicillins Swelling    Sulfa (Sulfonamide Antibiotics) Rash, Itching and Swelling       Objective:  Visit Vitals    /58 (BP 1 Location: Left arm, BP Patient Position: Sitting)    Pulse (!) 58    Temp 97.9 °F (36.6 °C) (Oral)    Resp 12    Ht 5' 2\" (1.575 m)    Wt 205 lb (93 kg)    SpO2 96%    BMI 37.49 kg/m2     Physical Exam:   General appearance - alert, well appearing, and in no distress  Mental status - alert, oriented to person, place, and time  EYE-SHARAD, EOMI, corneas normal, no foreign bodies, visual acuity normal both eyes, no periorbital cellulitis  ENT-ENT exam normal, no neck nodes or sinus tenderness  Nose - normal and patent, no erythema, discharge or polyps  Mouth - mucous membranes moist, pharynx normal without lesions  Neck - supple, no significant adenopathy   Chest - clear to auscultation, no wheezes, rales or rhonchi, symmetric air entry   Heart - normal rate, regular rhythm, normal S1, S2, no murmurs, rubs, clicks or gallops   Abdomen - soft, nontender, nondistended, no masses or organomegaly, obese, no rebound  Lymph- no adenopathy palpable  Ext-peripheral pulses normal, no pedal edema, no clubbing or cyanosis, + 1 edema on left ankle  Skin-Warm and dry. no hyperpigmentation, vitiligo, or suspicious lesions, scattered resolved superifical skin bites, no erythema  Neuro -alert, oriented, normal speech, no focal findings or movement disorder noted  Neck-normal C-spine, no tenderness, full ROM without pain      Results for orders placed or performed in visit on 88/28/73   METABOLIC PANEL, COMPREHENSIVE   Result Value Ref Range    Glucose 96 65 - 99 mg/dL    BUN 27 8 - 27 mg/dL    Creatinine 0.94 0.57 - 1.00 mg/dL    GFR est non-AA 61 >59 mL/min/1.73    GFR est AA 70 >59 mL/min/1.73    BUN/Creatinine ratio 29 (H) 12 - 28    Sodium 139 134 - 144 mmol/L    Potassium 4.5 3.5 - 5.2 mmol/L    Chloride 98 96 - 106 mmol/L    CO2 20 18 - 29 mmol/L    Calcium 9.9 8.7 - 10.3 mg/dL    Protein, total 7.0 6.0 - 8.5 g/dL    Albumin 4.6 3.5 - 4.8 g/dL    GLOBULIN, TOTAL 2.4 1.5 - 4.5 g/dL    A-G Ratio 1.9 1.2 - 2.2    Bilirubin, total 0.5 0.0 - 1.2 mg/dL    Alk.  phosphatase 80 39 - 117 IU/L    AST (SGOT) 23 0 - 40 IU/L    ALT (SGPT) 22 0 - 32 IU/L   MAGNESIUM   Result Value Ref Range    Magnesium 1.6 1.6 - 2.3 mg/dL     Diagnoses and all orders for this visit:    1. Essential hypertension  Tolerating losartan 25 mg in addition to 50/12.5 losartan hctz  Cont meds  -     METABOLIC PANEL, COMPREHENSIVE; Future  -     MAGNESIUM; Future  -     MICROALBUMIN, UR, RAND W/ MICROALBUMIN/CREA RATIO; Future    2. Primary insomnia  Discussed use of melatonin  We discussed possibly increase in ssri to higher dose but I would like for her to try melatonin first  She defers counselor    3. Coronary artery disease due to lipid rich plaque  Cont chol meds    4. Localized edema  Compliance with stockings  Will not add lasix as bp already in good range and may cause hypotension        6 month follow up and recheck thryoid and cholesterol and cmp    This note will not be viewable in MyChart.

## 2017-10-26 NOTE — PATIENT INSTRUCTIONS
Melatonin (By mouth)   Melatonin (mannie-a-TOE-kavitha)  Treats insomnia. Brand Name(s): Advanced Sleep Melatonin, Basic's Melatonin, Finest Nutrition Melatonin, [facility] Melatonin, Nature's Blend Melatonin, Optimum Melatonin, PharmAssure Melatonin, Rite Aid Melatonin, [employer] [employer]aturals Melatonin   There may be other brand names for this medicine. When This Medicine Should Not Be Used: You should not use this medicine if you have had an allergic reaction to melatonin. How to Use This Medicine:   Capsule, Long Acting Capsule, Liquid, Tablet, Long Acting Tablet  · Your doctor will tell you how much medicine to use. Do not use more than directed. · Follow the instructions on the medicine label if you are using this medicine without a prescription. · Take your dose 20 minutes before your bedtime. You may take this medicine with or without food. · The liquid may be taken directly or combined with water or juice. If a dose is missed:   · If you miss a dose or forget to use your medicine, call your doctor or pharmacist for instructions. How to Store and Dispose of This Medicine:   · Store the medicine in a closed container at room temperature, away from heat, moisture, and direct light. · Keep all medicine out of the reach of children. Never share your medicine with anyone. · Ask your pharmacist, doctor, or health caregiver about the best way to dispose of any outdated medicine or medicine no longer needed. Drugs and Foods to Avoid:   Ask your doctor or pharmacist before using any other medicine, including over-the-counter medicines, vitamins, and herbal products. · Make sure your doctor knows if you are also using any tranquilizer medicines, or if you are also using any sedative medicines. Warnings While Using This Medicine:   · Make sure your doctor knows if you are pregnant or breast feeding, or if you have an autoimmune condition.  Make sure your doctor knows if you are feeling sad or depressed. · This medicine may make you drowsy. Avoid driving, using machines, or doing anything else that might be dangerous if you are not alert. Possible Side Effects While Using This Medicine: If you notice these less serious side effects, talk with your doctor:  · Feeling sluggish or tired in the morning. · Headache. If you notice other side effects that you think are caused by this medicine, tell your doctor. Call your doctor for medical advice about side effects. You may report side effects to FDA at 9-222-FDA-6940  © 2017 [address] Information is for End User's use only and may not be sold, redistributed or otherwise used for commercial purposes. The above information is an  only. It is not intended as medical advice for individual conditions or treatments. Talk to your doctor, nurse or pharmacist before following any medical regimen to see if it is safe and effective for you.

## 2017-10-26 NOTE — MR AVS SNAPSHOT
Visit Information Date & Time Provider Department Dept. Phone Encounter #  
 10/26/2017  8:00 AM Funmi Lauren MD [facility] [address] [facility] [id number] Your Appointments 10/22/2018 10:00 AM  
Any with Cameron Weller MD  
[address] [facility] ([facility]) Appt Note: Yearly PAP f/up bring machine [address] [facility] [address]  
  
   
 [address] [facility] [address] Upcoming Health Maintenance Date Due  
 FOOT EXAM Q1 6/20/1955 EYE EXAM RETINAL OR DILATED Q1 6/20/1955 FOBT Q 1 YEAR AGE 50-75 6/20/1995 GLAUCOMA SCREENING Q2Y 6/20/2010 OSTEOPOROSIS SCREENING (DEXA) 6/20/2010 HEMOGLOBIN A1C Q6M 10/27/2016 INFLUENZA AGE 9 TO ADULT 8/1/2017 MICROALBUMIN Q1 4/6/2018 LIPID PANEL Q1 4/6/2018 MEDICARE YEARLY EXAM 7/22/2018 BREAST CANCER SCRN MAMMOGRAM 4/19/2019 DTaP/Tdap/Td series (2 - Td) 1/15/2025 Allergies as of 10/26/2017  Review Complete On: 10/26/2017 By: Funmi Lauren MD  
  
 Severity Noted Reaction Type Reactions Adhesive  12/20/2013    Rash, Itching Advil [Ibuprofen]  12/20/2013    Swelling FEET SWELL Ceftin [Cefuroxime Axetil]  12/20/2013    Itching Lotensin [Benazepril]  12/20/2013    Cough Penicillins  12/20/2013    Swelling Sulfa (Sulfonamide Antibiotics)  12/20/2013    Rash, Itching, Swelling Current Immunizations  Never Reviewed Name Date Influenza High Dose Vaccine PF 10/1/2016, 9/22/2015 Pneumococcal Conjugate (PCV-13) 10/17/2016 Pneumococcal Polysaccharide (PPSV-23) 11/30/2010 Tdap 1/15/2015 Zoster Vaccine, Live 7/27/2011 Not reviewed this visit You Were Diagnosed With   
  
 Codes Comments Essential hypertension    -  Primary ICD-10-CM: I10 
ICD-9-CM: 401.9  Primary insomnia     ICD-10-CM: F51.01 
ICD-9-CM: 307.42   
 Coronary artery disease due to lipid rich plaque     ICD-10-CM: I25.10, I25.83 ICD-9-CM: 414.00, 414.3 Localized edema     ICD-10-CM: R60.0 ICD-9-CM: 681. 3 Vitals BP Pulse Temp Resp Height(growth percentile) Weight(growth percentile) 109/58 (BP 1 Location: Left arm, BP Patient Position: Sitting) (!) 58 97.9 °F (36.6 °C) (Oral) 12 5' 2\" (1.575 m) 205 lb (93 kg) SpO2 BMI OB Status Smoking Status 96% 37.49 kg/m2 Postmenopausal Former Smoker BMI and BSA Data Body Mass Index Body Surface Area  
 37.49 kg/m 2 2.02 m 2 Preferred Pharmacy Pharmacy Name Phone [id number] [place], [address] [phone] Your Updated Medication List  
  
   
This list is accurate as of: 10/26/17  8:57 AM.  Always use your most recent med list.  
  
  
  
  
 ACCU-CHEK COMPACT PLUS TEST Strp Generic drug:  Blood Sugar Diagnostic, Drum USE TO TEST BLOOD SUGAR 4 TIMES DAILY. DX E11.9  
  
 aspirin, buffered 81 mg Tab Take  by mouth daily. cholecalciferol 1,000 unit Cap Commonly known as:  VITAMIN D3 Take 1 Cap by mouth nightly. HumaLOG KwikPen 100 unit/mL kwikpen Generic drug:  insulin lispro  
  
 insulin glargine 100 unit/mL injection Commonly known as:  LANTUS  
20 Units by SubCUTAneous route two (2) times a day. levothyroxine 25 mcg tablet Commonly known as:  SYNTHROID  
TAKE 1 TABLET BY MOUTH EVERY DAY BEFORE BREAKFAST  
  
 losartan 25 mg tablet Commonly known as:  COZAAR  
TAKE 1 TABLET BY MOUTH EVERY DAY  
  
 losartan-hydroCHLOROthiazide 50-12.5 mg per tablet Commonly known as:  HYZAAR  
TAKE 1 TAB BY MOUTH TWO (2) TIMES A DAY. LOVAZA 1 gram capsule Generic drug:  omega-3 acid ethyl esters Take 2 g by mouth two (2) times a day. magnesium oxide 400 mg tablet Commonly known as:  MAG-OX  
TAKE 1 TABLET BY MOUTH EVERY DAY  
  
 metFORMIN  mg tablet Commonly known as:  GLUCOPHAGE XR  
TAKE 4 TABLETS BY MOUTH EVERY DAY  
  
 metoprolol tartrate 50 mg tablet Commonly known as:  LOPRESSOR  
TAKE 1 TAB BY MOUTH DAILY. sertraline 100 mg tablet Commonly known as:  ZOLOFT  
TAKE 1 TAB BY MOUTH EVERY EVENING. simvastatin 20 mg tablet Commonly known as:  ZOCOR  
TAKE 1 TABLET BY MOUTH EVERY DAY AT NIGHT To-Do List   
 10/26/2017 Lab:  MAGNESIUM   
  
 10/26/2017 Lab:  METABOLIC PANEL, COMPREHENSIVE   
  
 10/26/2017 Lab:  MICROALBUMIN, UR, RAND W/ MICROALBUMIN/CREA RATIO Patient Instructions Melatonin (By mouth) Melatonin (mannie-a-TOE-kavitha) Treats insomnia. Brand Name(s): Advanced Sleep Melatonin, Basic's Melatonin, Finest Nutrition Melatonin, [facility] Melatonin, Nature's Blend Melatonin, Optimum Melatonin, PharmAssure Melatonin, Rite Aid Melatonin, [employer]aturals Melatonin There may be other brand names for this medicine. When This Medicine Should Not Be Used: You should not use this medicine if you have had an allergic reaction to melatonin. How to Use This Medicine:  
Capsule, Long Acting Capsule, Liquid, Tablet, Long Acting Tablet · Your doctor will tell you how much medicine to use. Do not use more than directed. · Follow the instructions on the medicine label if you are using this medicine without a prescription. · Take your dose 20 minutes before your bedtime. You may take this medicine with or without food. · The liquid may be taken directly or combined with water or juice. If a dose is missed: · If you miss a dose or forget to use your medicine, call your doctor or pharmacist for instructions. How to Store and Dispose of This Medicine: · Store the medicine in a closed container at room temperature, away from heat, moisture, and direct light. · Keep all medicine out of the reach of children. Never share your medicine with anyone. · Ask your pharmacist, doctor, or health caregiver about the best way to dispose of any outdated medicine or medicine no longer needed. Drugs and Foods to Avoid: Ask your doctor or pharmacist before using any other medicine, including over-the-counter medicines, vitamins, and herbal products. · Make sure your doctor knows if you are also using any tranquilizer medicines, or if you are also using any sedative medicines. Warnings While Using This Medicine: · Make sure your doctor knows if you are pregnant or breast feeding, or if you have an autoimmune condition. Make sure your doctor knows if you are feeling sad or depressed. · This medicine may make you drowsy. Avoid driving, using machines, or doing anything else that might be dangerous if you are not alert. Possible Side Effects While Using This Medicine: If you notice these less serious side effects, talk with your doctor: · Feeling sluggish or tired in the morning. · Headache. If you notice other side effects that you think are caused by this medicine, tell your doctor. Call your doctor for medical advice about side effects. You may report side effects to FDA at 4-049-FDA-1300 © 2017 [address] Information is for End User's use only and may not be sold, redistributed or otherwise used for commercial purposes. The above information is an  only. It is not intended as medical advice for individual conditions or treatments. Talk to your doctor, nurse or pharmacist before following any medical regimen to see if it is safe and effective for you. Introducing [address]! Dear Andrea Breen: Thank you for requesting a [employer] account. Our records indicate that you already have an active [employer] account. You can access your account anytime at [email or web address]. [facility]/"[employer] Did you know that you can access your hospital and ER discharge instructions at any time in [employer]? You can also review all of your test results from your hospital stay or ER visit. Additional Information If you have questions, please visit the Frequently Asked Questions section of the [employer] website at [email or web address] Remember, [employer] is NOT to be used for urgent needs. For medical emergencies, dial 911. Now available from your iPhone and Android! Please provide this summary of care documentation to your next provider. Your primary care clinician is listed as Mela Brewer. If you have any questions after today's visit, please call [phone].

## 2017-11-02 ENCOUNTER — HOSPITAL ENCOUNTER (OUTPATIENT)
Dept: LAB | Age: 72
Discharge: HOME | End: 2017-11-02
Payer: MEDICARE

## 2017-11-02 PROCEDURE — 80053 COMPREHEN METABOLIC PANEL: CPT

## 2017-11-02 PROCEDURE — 36415 COLL VENOUS BLD VENIPUNCTURE: CPT

## 2017-11-02 PROCEDURE — 83735 ASSAY OF MAGNESIUM: CPT

## 2017-11-02 PROCEDURE — 82043 UR ALBUMIN QUANTITATIVE: CPT

## 2017-11-03 LAB
ALBUMIN SERPL-MCNC: 4.3 G/DL (ref 3.5–4.8)
ALBUMIN/CREAT UR: 710.6 MG/G CREAT (ref 0–30)
ALBUMIN/GLOB SERPL: 1.7 {RATIO} (ref 1.2–2.2)
ALP SERPL-CCNC: 90 IU/L (ref 39–117)
ALT SERPL-CCNC: 28 IU/L (ref 0–32)
AST SERPL-CCNC: 21 IU/L (ref 0–40)
BILIRUB SERPL-MCNC: 0.4 MG/DL (ref 0–1.2)
BUN SERPL-MCNC: 19 MG/DL (ref 8–27)
BUN/CREAT SERPL: 21 (ref 12–28)
CALCIUM SERPL-MCNC: 9.4 MG/DL (ref 8.7–10.3)
CHLORIDE SERPL-SCNC: 100 MMOL/L (ref 96–106)
CO2 SERPL-SCNC: 20 MMOL/L (ref 18–29)
CREAT SERPL-MCNC: 0.92 MG/DL (ref 0.57–1)
CREAT UR-MCNC: 111.8 MG/DL
GFR SERPLBLD CREATININE-BSD FMLA CKD-EPI: 62 ML/MIN/1.73
GFR SERPLBLD CREATININE-BSD FMLA CKD-EPI: 72 ML/MIN/1.73
GLOBULIN SER CALC-MCNC: 2.6 G/DL (ref 1.5–4.5)
GLUCOSE SERPL-MCNC: 148 MG/DL (ref 65–99)
MAGNESIUM SERPL-MCNC: 1.5 MG/DL (ref 1.6–2.3)
MICROALBUMIN UR-MCNC: 794.4 UG/ML
POTASSIUM SERPL-SCNC: 4.4 MMOL/L (ref 3.5–5.2)
PROT SERPL-MCNC: 6.9 G/DL (ref 6–8.5)
SODIUM SERPL-SCNC: 141 MMOL/L (ref 134–144)

## 2017-11-05 DIAGNOSIS — E83.42 HYPOMAGNESEMIA: Primary | ICD-10-CM

## 2017-11-05 RX ORDER — LANOLIN ALCOHOL/MO/W.PET/CERES
400 CREAM (GRAM) TOPICAL 2 TIMES DAILY
Qty: 60 TAB | Refills: 0 | Status: SHIPPED | OUTPATIENT
Start: 2017-11-05 | End: 2017-12-02 | Stop reason: SDUPTHER

## 2017-11-05 NOTE — PROGRESS NOTES
Please call pt and tell hr to take her mag bid. I ordered rx for her and need re check 11/20/17.  Please her mag lab

## 2017-11-08 RX ORDER — LEVOTHYROXINE SODIUM 25 UG/1
25 TABLET ORAL
Qty: 90 TAB | Refills: 0 | Status: SHIPPED | OUTPATIENT
Start: 2017-11-08 | End: 2018-02-03 | Stop reason: SDUPTHER

## 2017-11-08 RX ORDER — LANOLIN ALCOHOL/MO/W.PET/CERES
400 CREAM (GRAM) TOPICAL 2 TIMES DAILY
Qty: 60 TAB | Refills: 0 | Status: CANCELLED | OUTPATIENT
Start: 2017-11-08

## 2017-11-08 NOTE — TELEPHONE ENCOUNTER
From: Irving Goodpasture  To: Emma Chacon MD  Sent: 11/8/2017 12:56 AM EST  Subject: Medication Renewal Request    Original authorizing provider: Tish Lusher, MD Irving Goodpasture would like a refill of the following medications:  levothyroxine (SYNTHROID) 25 mcg tablet [Sophia Chung MD]  magnesium oxide (MAG-OX) 400 mg tablet Emma Chacon MD]    Preferred pharmacy: [id number]    Comment:

## 2017-11-20 DIAGNOSIS — E83.42 HYPOMAGNESEMIA: ICD-10-CM

## 2017-11-27 ENCOUNTER — HOSPITAL ENCOUNTER (OUTPATIENT)
Dept: LAB | Age: 72
Discharge: HOME | End: 2017-11-27
Payer: MEDICARE

## 2017-11-27 PROCEDURE — 36415 COLL VENOUS BLD VENIPUNCTURE: CPT

## 2017-11-27 PROCEDURE — 83735 ASSAY OF MAGNESIUM: CPT

## 2017-11-28 LAB — MAGNESIUM SERPL-MCNC: 1.8 MG/DL (ref 1.6–2.3)

## 2017-12-02 RX ORDER — LANOLIN ALCOHOL/MO/W.PET/CERES
400 CREAM (GRAM) TOPICAL 2 TIMES DAILY
Qty: 180 TAB | Refills: 1 | Status: SHIPPED | OUTPATIENT
Start: 2017-12-02 | End: 2018-05-19 | Stop reason: SDUPTHER

## 2017-12-03 NOTE — PROGRESS NOTES
Please tell pt her magnesium level is within normal limits. She can stay on current mag dose since her kidney function is wnl. If develops diarrhea may be from magnesium so she will need evaluation of mag if diarrhea develops.

## 2018-01-31 DIAGNOSIS — E11.21 TYPE II DIABETES MELLITUS WITH NEPHROPATHY (HCC): ICD-10-CM

## 2018-02-02 RX ORDER — LOSARTAN POTASSIUM 25 MG/1
TABLET ORAL
Qty: 90 TAB | Refills: 0 | Status: SHIPPED | OUTPATIENT
Start: 2018-02-02 | End: 2018-02-23 | Stop reason: ALTCHOICE

## 2018-02-07 DIAGNOSIS — E11.21 TYPE II DIABETES MELLITUS WITH NEPHROPATHY (HCC): ICD-10-CM

## 2018-02-07 RX ORDER — LOSARTAN POTASSIUM 25 MG/1
TABLET ORAL
Qty: 90 TAB | Refills: 0 | Status: SHIPPED | OUTPATIENT
Start: 2018-02-07 | End: 2018-02-23 | Stop reason: ALTCHOICE

## 2018-02-09 DIAGNOSIS — E11.21 TYPE II DIABETES MELLITUS WITH NEPHROPATHY (HCC): ICD-10-CM

## 2018-02-09 RX ORDER — LOSARTAN POTASSIUM 25 MG/1
TABLET ORAL
Qty: 90 TAB | Refills: 0 | Status: SHIPPED | OUTPATIENT
Start: 2018-02-09 | End: 2018-02-23 | Stop reason: ALTCHOICE

## 2018-02-19 DIAGNOSIS — E11.9 DIABETES MELLITUS WITHOUT COMPLICATION (HCC): ICD-10-CM

## 2018-02-19 NOTE — TELEPHONE ENCOUNTER
Patient is need FLA and forgot her test Strips Please call it in [address] [place] [address] TEST strp  To the be called at [facility] at [address]   Her no [phone]

## 2018-02-20 ENCOUNTER — PATIENT MESSAGE (OUTPATIENT)
Dept: INTERNAL MEDICINE CLINIC | Age: 73
End: 2018-02-20

## 2018-02-21 NOTE — TELEPHONE ENCOUNTER
Pts  called, upset regarding the rx request. I told  that I have faxed this rx 3 times and I have received fax confirmation each time. I apologized for the inconvenience and stated I will call the phamacist back to see what we need to do. I spoke with the pharmacist, advise that I am sending the RX again. I will follow up before lunch to make sure the fax was received. I called pharmacy, per pharmacy tech, RX was received they will take care of the rest.       Pt was informed that RX was received and to call them to see when it will be ready. Apologized again for the inconvenience.

## 2018-02-23 RX ORDER — LOSARTAN POTASSIUM AND HYDROCHLOROTHIAZIDE 12.5; 5 MG/1; MG/1
TABLET ORAL
Qty: 180 TAB | Refills: 0 | Status: SHIPPED | OUTPATIENT
Start: 2018-02-23 | End: 2018-04-26 | Stop reason: SDUPTHER

## 2018-03-26 RX ORDER — SERTRALINE HYDROCHLORIDE 100 MG/1
TABLET, FILM COATED ORAL
Qty: 90 TAB | Refills: 3 | Status: SHIPPED | OUTPATIENT
Start: 2018-03-26 | End: 2019-06-23 | Stop reason: SDUPTHER

## 2018-04-21 RX ORDER — SIMVASTATIN 20 MG/1
TABLET, FILM COATED ORAL
Qty: 90 TAB | Refills: 0 | Status: SHIPPED | OUTPATIENT
Start: 2018-04-21 | End: 2018-07-25 | Stop reason: SDUPTHER

## 2018-04-26 ENCOUNTER — OFFICE VISIT (OUTPATIENT)
Dept: INTERNAL MEDICINE CLINIC | Age: 73
End: 2018-04-26

## 2018-04-26 VITALS
OXYGEN SATURATION: 97 % | TEMPERATURE: 97.5 F | WEIGHT: 205.4 LBS | HEIGHT: 62 IN | BODY MASS INDEX: 37.8 KG/M2 | RESPIRATION RATE: 14 BRPM | DIASTOLIC BLOOD PRESSURE: 77 MMHG | SYSTOLIC BLOOD PRESSURE: 128 MMHG | HEART RATE: 58 BPM

## 2018-04-26 DIAGNOSIS — I51.9 HEART DISEASE: ICD-10-CM

## 2018-04-26 DIAGNOSIS — I10 ESSENTIAL HYPERTENSION: Primary | ICD-10-CM

## 2018-04-26 DIAGNOSIS — F41.9 ANXIETY: ICD-10-CM

## 2018-04-26 PROBLEM — E66.01 SEVERE OBESITY (BMI 35.0-39.9) WITH COMORBIDITY (HCC): Status: ACTIVE | Noted: 2018-04-26

## 2018-04-26 PROBLEM — E11.21 TYPE 2 DIABETES WITH NEPHROPATHY (HCC): Status: ACTIVE | Noted: 2018-04-26

## 2018-04-26 RX ORDER — METOPROLOL TARTRATE 50 MG/1
25 TABLET ORAL 2 TIMES DAILY
Qty: 90 TAB | Refills: 2 | Status: SHIPPED | OUTPATIENT
Start: 2018-04-26 | End: 2018-11-14 | Stop reason: SDUPTHER

## 2018-04-26 RX ORDER — LOSARTAN POTASSIUM AND HYDROCHLOROTHIAZIDE 12.5; 5 MG/1; MG/1
TABLET ORAL
Qty: 180 TAB | Refills: 0 | Status: SHIPPED | OUTPATIENT
Start: 2018-04-26 | End: 2018-08-03 | Stop reason: SDUPTHER

## 2018-04-26 NOTE — MR AVS SNAPSHOT
[address] [facility] [address] 
 
 
 [address] [place] [address] Road 
[phone] Patient: Arleen Weathers MRN: [id number] DCA:1/52/[birth year]15 Visit Information Date & Time Provider Department Dept. Phone Encounter #  
 4/26/2018  8:00 AM Crystal Hair MD [facility] [address] [id number] Your Appointments 10/22/2018 10:00 AM  
Any with Sivakumar Lake MD  
[id number] [facility] ([address]) Appt Note: Yearly PAP f/up bring machine [address] [facility] [address]  
  
   
 [address] [facility] [address] [address] Upcoming Health Maintenance Date Due  
 FOOT EXAM Q1 6/20/1955 EYE EXAM RETINAL OR DILATED Q1 6/20/1955 FOBT Q 1 YEAR AGE 50-75 6/20/1995 GLAUCOMA SCREENING Q2Y 6/20/2010 Bone Densitometry (Dexa) Screening 6/20/2010 HEMOGLOBIN A1C Q6M 7/18/2018 MEDICARE YEARLY EXAM 7/22/2018 MICROALBUMIN Q1 1/18/2019 LIPID PANEL Q1 1/18/2019 BREAST CANCER SCRN MAMMOGRAM 4/19/2019 DTaP/Tdap/Td series (2 - Td) 1/15/2025 Allergies as of 4/26/2018  Review Complete On: 4/26/2018 By: Leon Cranker, LPN Severity Noted Reaction Type Reactions Adhesive  12/20/2013    Rash, Itching Advil [Ibuprofen]  12/20/2013    Swelling FEET SWELL Ceftin [Cefuroxime Axetil]  12/20/2013    Itching Lotensin [Benazepril]  12/20/2013    Cough Penicillins  12/20/2013    Swelling Sulfa (Sulfonamide Antibiotics)  12/20/2013    Rash, Itching, Swelling Current Immunizations  Never Reviewed Name Date Influenza High Dose Vaccine PF 10/1/2016, 9/22/2015 Pneumococcal Conjugate (PCV-13) 10/17/2016 Pneumococcal Polysaccharide (PPSV-23) 11/30/2010 Tdap 1/15/2015 Zoster Vaccine, Live 7/27/2011 Not reviewed this visit You Were Diagnosed With   
  
 Codes Comments Essential hypertension    -  Primary ICD-10-CM: I10 
ICD-9-CM: 401.9 BMI 37.0-37.9, adult     ICD-10-CM: Z68.37 ICD-9-CM: V85.37 Anxiety     ICD-10-CM: F41.9 ICD-9-CM: 300.00 Heart disease     ICD-10-CM: I51.9 ICD-9-CM: 429.9 Vitals BP Pulse Temp Resp Height(growth percentile) Weight(growth percentile) 128/77 (BP 1 Location: Left arm, BP Patient Position: Sitting) (!) 58 97.5 °F (36.4 °C) (Oral) 14 5' 2\" (1.575 m) 205 lb 6.4 oz (93.2 kg) SpO2 BMI OB Status Smoking Status 97% 37.57 kg/m2 Postmenopausal Former Smoker BMI and BSA Data Body Mass Index Body Surface Area  
 37.57 kg/m 2 2.02 m 2 Preferred Pharmacy Pharmacy Name Phone [id number]- [facility], [address] [facility] [phone] Your Updated Medication List  
  
   
This list is accurate as of 4/26/18  8:47 AM.  Always use your most recent med list.  
  
  
  
  
 aspirin, buffered 81 mg Tab Take  by mouth daily. Blood Sugar Diagnostic, Yolande Potter Commonly known as:  ACCU-CHEK COMPACT PLUS TEST  
USE TO TEST BLOOD SUGAR 4 TIMES DAILY. DX E11.9 cholecalciferol 1,000 unit Cap Commonly known as:  VITAMIN D3 Take 1 Cap by mouth nightly. HumaLOG KwikPen Insulin 100 unit/mL kwikpen Generic drug:  insulin lispro  
  
 insulin glargine 100 unit/mL injection Commonly known as:  LANTUS U-100 INSULIN  
20 Units by SubCUTAneous route two (2) times a day. levothyroxine 25 mcg tablet Commonly known as:  SYNTHROID  
TAKE 1 TABLET BY MOUTH EVERY DAY BEFORE BREAKFAST  
  
 losartan-hydroCHLOROthiazide 50-12.5 mg per tablet Commonly known as:  HYZAAR  
TAKE 1 TABLET BY MOUTH TWICE A DAY  
  
 LOVAZA 1 gram capsule Generic drug:  omega-3 acid ethyl esters Take 2 g by mouth two (2) times a day. magnesium oxide 400 mg tablet Commonly known as:  MAG-OX Take 1 Tab by mouth two (2) times a day. metFORMIN  mg tablet Commonly known as:  GLUCOPHAGE XR  
TAKE 4 TABLETS BY MOUTH EVERY DAY  
  
 metoprolol tartrate 50 mg tablet Commonly known as:  LOPRESSOR Take 0.5 Tabs by mouth two (2) times a day. sertraline 100 mg tablet Commonly known as:  ZOLOFT  
TAKE 1 TABLET BY MOUTH EVERY DAY IN THE EVENING  
  
 simvastatin 20 mg tablet Commonly known as:  ZOCOR  
TAKE 1 TABLET BY MOUTH EVERY EVENING Prescriptions Sent to Pharmacy Refills  
 losartan-hydroCHLOROthiazide (HYZAAR) 50-12.5 mg per tablet 0 Sig: TAKE 1 TABLET BY MOUTH TWICE A DAY Class: Normal  
 Pharmacy: [facility]/pharmacy #[id number] [address] [address] Ph #: [phone]  
 metoprolol tartrate (LOPRESSOR) 50 mg tablet 2 Sig: Take 0.5 Tabs by mouth two (2) times a day. Class: Normal  
 Pharmacy: [facility]/pharmacy #[id number] [address] [address] [facility] Ph #: [phone] Route: Oral  
  
Introducing [facility]! Dear Kulwant Meeks: Thank you for requesting a [facility] account. Our records indicate that you have previously registered for a [facility] account but its currently inactive. Please call our [employer] support line at [phone]. Additional Information If you have questions, please visit the Frequently Asked Questions section of the [employer] website at [email or web address] Remember, [facility] is NOT to be used for urgent needs. For medical emergencies, dial 911. Now available from your iPhone and Android! Please provide this summary of care documentation to your next provider. Your primary care clinician is listed as Alex Burrell. If you have any questions after today's visit, please call [phone].

## 2018-04-26 NOTE — PROGRESS NOTES
Colin Perdomo is a 67 y.o. female and presents with Hypertension (6 month follow up ) and Diabetes  . Diabetes  BS have been labile but have stabilized  She was seen by dr. Cl Manuel. Pt reports her a1c was 7.1 at last check. This was done in Jan.  appt next month  She did receive her high dose flu shot  She is taking 46 units Lantus in the evening  Pt had a late lunch. She noted her bs at [de-identified] she gets symptomatic:  She gets shaky. she has not had any night shaking. Dr. Levy Durand + cataract, no diabetic retinopathy, Dec or Jan (opthal)  Podiatry 9 months ago becaseu of ingrown toenail.    humolag 8 units tid baseline but will increase     Subjective:   Colin Perdomo is a 67 y.o. female with hypertension. Hypertension ROS: taking medications as instructed, no medication side effects noted, no TIA's, no chest pain on exertion, no dyspnea on exertion, no swelling of ankles. New concerns she is tolerating her losartan 50/12.5 twice a day  Her microalbumin levels were discussed with her. She has mild edema in her legs. Sleep apnea  Saw Dr. Aniket Weaver, pt is on CPAP machine  She had pressure changed but now has settled into new change  Sleeping 6-7 hours/night still. When she wakes she feels well rested but by afternoon she feels tired no change. SUBJECTIVE: Colin Perdomo is a 67 y.o. female here for follow up of hypothyroidism. Lab Results   Component Value Date/Time    TSH 3.720 04/06/2017 09:29 AM     Thyroid ROS: denies fatigue, weight changes, heat/cold intolerance, bowel/skin changes or CVS symptoms. MH  7/10   She is not sleeping well. Fatigue in the afternoon- no change  She thinks her 100 mg sertraline is the right dose and does not want to decrease right now    CAD  She is follwed by Dr. Cheri Rodriguez  She is going 3 times per week but prob 2 x/week  No cp or sob at rest. She does have some sob exertion stairs. She is a little winded after 8-10 stairs but continues to push forward.     Dr. Turner Junior did echo 2017 and evaluated fro PVD  No se of cholesterol medication, simva 20 mg    Review of Systems  Constitutional: negative for fevers, chills, anorexia and weight loss  Eyes:   negative for visual disturbance and irritation  ENT:   negative for tinnitus,sore throat,nasal congestion,ear pains. hoarseness  Respiratory:  negative for cough, hemoptysis, dyspnea,wheezing  CV:   negative for chest pain, palpitations, lower extremity edema  GI:   negative for nausea, vomiting, diarrhea, abdominal pain,melena  Endo:               negative for polyuria,polydipsia,polyphagia,heat intolerance  Genitourinary: negative for frequency, dysuria and hematuria  Integument:  negative for rash and pruritus  Hematologic:  negative for easy bruising and gum/nose bleeding  Musculoskel: negative for myalgias, arthralgias, back pain, muscle weakness, joint pain  Neurological:  negative for headaches, dizziness, vertigo, memory problems and gait   Behavl/Psych: negative for feelings of anxiety, depression, mood changes    Past Medical History:   Diagnosis Date    Arthritis     CAD (coronary artery disease) 2006    STENT PLACED>PLAQUE BROKE OFF--HAD \"MINOR MI\"    Deviated septum 13    HAS TROUBLE BREATHING THROUGH LEFT SIDE; CT SHOWED POLYP    Diabetes ([address]     Heart disease     Hypertension     Incontinence     Nasal polyp     Unspecified sleep apnea 13    CAN'T TOLERATE CPAP     Past Surgical History:   Procedure Laterality Date    CARDIAC SURG PROCEDURE UNLIST      CARDIAC CATH;ANGIOPLASTY WITH STENT    HX  SECTION      X3    HX COLONOSCOPY  2011    HX HEENT  ?     SEPTOPLASTY     Social History     Social History    Marital status:      Spouse name: N/A    Number of children: N/A    Years of education: N/A     Social History Main Topics    Smoking status: Former Smoker     Packs/day: 1.00     Years: 5.00     Quit date: 1970    Smokeless tobacco: Never Used    Alcohol use Yes Comment: \"VERY LITTLE ALCOHOL\"    Drug use: No    Sexual activity: Not Asked     Other Topics Concern    None     Social History Narrative            3 children women 36, 40, 37 healthy other than skin issues     Family History   Problem Relation Age of Onset    Heart Disease Mother     Heart Surgery Mother      VALVE REPLACED AT AGE 72    Heart Attack Father 46     MASSIVE     Anesth Problems Neg Hx      Current Outpatient Prescriptions   Medication Sig Dispense Refill    simvastatin (ZOCOR) 20 mg tablet TAKE 1 TABLET BY MOUTH EVERY EVENING 90 Tab 0    sertraline (ZOLOFT) 100 mg tablet TAKE 1 TABLET BY MOUTH EVERY DAY IN THE EVENING 90 Tab 3    losartan-hydroCHLOROthiazide (HYZAAR) 50-12.5 mg per tablet TAKE 1 TABLET BY MOUTH TWICE A  Tab 0    Blood Sugar Diagnostic, Drum (ACCU-CHEK COMPACT PLUS TEST) strp USE TO TEST BLOOD SUGAR 4 TIMES DAILY. DX E11.9 357 Strip 0    levothyroxine (SYNTHROID) 25 mcg tablet TAKE 1 TABLET BY MOUTH EVERY DAY BEFORE BREAKFAST 90 Tab 0    magnesium oxide (MAG-OX) 400 mg tablet Take 1 Tab by mouth two (2) times a day. 180 Tab 1    metoprolol tartrate (LOPRESSOR) 50 mg tablet TAKE 1 TABLET BY MOUTH EVERY DAY 90 Tab 1    metFORMIN ER (GLUCOPHAGE XR) 500 mg tablet TAKE 4 TABLETS BY MOUTH EVERY DAY  3    insulin glargine (LANTUS) 100 unit/mL injection 20 Units by SubCUTAneous route two (2) times a day. (Patient taking differently: 46 Units by SubCUTAneous route two (2) times a day. 46 in the evening) 1 Vial 3    HUMALOG KWIKPEN 100 unit/mL kwikpen   3    Cholecalciferol, Vitamin D3, (VITAMIN D3) 1,000 unit cap Take 1 Cap by mouth nightly. 30 Cap 3    Aspirin, Buffered 81 mg tab Take  by mouth daily.  omega-3 acid ethyl esters (LOVAZA) 1 gram capsule Take 2 g by mouth two (2) times a day.        Allergies   Allergen Reactions    Adhesive Rash and Itching    Advil [Ibuprofen] Swelling     FEET SWELL    Ceftin [Cefuroxime Axetil] Itching    Lotensin [Benazepril] Cough    Penicillins Swelling    Sulfa (Sulfonamide Antibiotics) Rash, Itching and Swelling       Objective:  Visit Vitals    /77 (BP 1 Location: Left arm, BP Patient Position: Sitting)    Pulse (!) 58    Temp 97.5 °F (36.4 °C) (Oral)    Resp 14    Ht 5' 2\" (1.575 m)    Wt 205 lb 6.4 oz (93.2 kg)    SpO2 97%    BMI 37.57 kg/m2     Physical Exam:   General appearance - alert, well appearing, and in no distress  Mental status - alert, oriented to person, place, and time  EYE-SHARAD, EOMI, corneas normal, no foreign bodies, visual acuity normal both eyes, no periorbital cellulitis  ENT-ENT exam normal, no neck nodes or sinus tenderness  Nose - normal and patent, no erythema, discharge or polyps  Mouth - mucous membranes moist, pharynx normal without lesions  Neck - supple, no significant adenopathy   Chest - clear to auscultation, no wheezes, rales or rhonchi, symmetric air entry   Heart - normal rate, regular rhythm, normal S1, S2, no murmurs, rubs, clicks or gallops   Abdomen - soft, nontender, nondistended, no masses or organomegaly, obese, no rebound  Lymph- no adenopathy palpable  Ext-peripheral pulses normal, no pedal edema, no clubbing or cyanosis, + 1 edema on left ankle  Skin-Warm and dry. no hyperpigmentation, vitiligo, or suspicious lesions, scattered resolved superifical skin bites, no erythema  Neuro -alert, oriented, normal speech, no focal findings or movement disorder noted  Neck-normal C-spine, no tenderness, full ROM without pain      Results for orders placed or performed in visit on 11/27/17   MAGNESIUM   Result Value Ref Range    Magnesium 1.8 1.6 - 2.3 mg/dL     Diagnoses and all orders for this visit:    1. Essential hypertension  Cont meds  discuseed some edema may be due to urine protein  Cont meds for now  -     losartan-hydroCHLOROthiazide (HYZAAR) 50-12.5 mg per tablet; TAKE 1 TABLET BY MOUTH TWICE A DAY  -     metoprolol tartrate (LOPRESSOR) 50 mg tablet;  Take 0.5 Tabs by mouth two (2) times a day. 2. BMI 37.0-37.9, adult  Encouraged weight watchers  She will consider    3. Anxiety  Ssri may be causign wegiht issues  discussedw ith pt but would like to stay on for now  She has a sister with dementia and it worries her     4. Heart disease  Cont meds  Follow up with dr. Clarke Brar    Pt is seeing several specialists. endcrine and cardiac  She can see me in 6 months or sooner if needed  Should come for medicare wellness for prevention    This note will not be viewable in MyChart.

## 2018-05-19 RX ORDER — LANOLIN ALCOHOL/MO/W.PET/CERES
CREAM (GRAM) TOPICAL
Qty: 240 TAB | Refills: 1 | Status: SHIPPED | OUTPATIENT
Start: 2018-05-19 | End: 2022-08-17

## 2018-07-25 RX ORDER — SIMVASTATIN 20 MG/1
TABLET, FILM COATED ORAL
Qty: 90 TAB | Refills: 0 | Status: SHIPPED | OUTPATIENT
Start: 2018-07-25 | End: 2018-10-24 | Stop reason: SDUPTHER

## 2018-08-03 DIAGNOSIS — I10 ESSENTIAL HYPERTENSION: ICD-10-CM

## 2018-08-03 RX ORDER — LOSARTAN POTASSIUM AND HYDROCHLOROTHIAZIDE 12.5; 5 MG/1; MG/1
TABLET ORAL
Qty: 180 TAB | Refills: 0 | Status: SHIPPED | OUTPATIENT
Start: 2018-08-03 | End: 2018-11-03 | Stop reason: SDUPTHER

## 2018-08-05 DIAGNOSIS — E11.21 TYPE II DIABETES MELLITUS WITH NEPHROPATHY (HCC): ICD-10-CM

## 2018-08-05 RX ORDER — LOSARTAN POTASSIUM 25 MG/1
TABLET ORAL
Qty: 90 TAB | Refills: 0 | Status: SHIPPED | OUTPATIENT
Start: 2018-08-05 | End: 2018-11-03 | Stop reason: SDUPTHER

## 2018-08-22 RX ORDER — LEVOTHYROXINE SODIUM 25 UG/1
TABLET ORAL
Qty: 30 TAB | Refills: 0 | Status: SHIPPED | OUTPATIENT
Start: 2018-08-22 | End: 2018-08-24 | Stop reason: ALTCHOICE

## 2018-08-24 RX ORDER — LEVOTHYROXINE SODIUM 25 UG/1
TABLET ORAL
Qty: 30 TAB | Refills: 0 | Status: SHIPPED | OUTPATIENT
Start: 2018-08-24 | End: 2018-10-12 | Stop reason: SDUPTHER

## 2018-09-22 DIAGNOSIS — E11.9 DIABETES MELLITUS WITHOUT COMPLICATION (HCC): ICD-10-CM

## 2018-09-22 RX ORDER — LANCETS
EACH MISCELLANEOUS
Qty: 357 STRIP | Refills: 3 | Status: SHIPPED | OUTPATIENT
Start: 2018-09-22 | End: 2021-10-15

## 2018-10-09 ENCOUNTER — TELEPHONE (OUTPATIENT)
Dept: SLEEP MEDICINE | Age: 73
End: 2018-10-09

## 2018-10-09 DIAGNOSIS — G47.33 OBSTRUCTIVE SLEEP APNEA (ADULT) (PEDIATRIC): Primary | ICD-10-CM

## 2018-10-09 NOTE — TELEPHONE ENCOUNTER
Requests supply order for her PAP device. Patient goes to [facility]. Scheduled for follow-up 1/2/2019.

## 2018-10-11 ENCOUNTER — OFFICE VISIT (OUTPATIENT)
Dept: INTERNAL MEDICINE CLINIC | Age: 73
End: 2018-10-11

## 2018-10-11 VITALS
HEIGHT: 62 IN | RESPIRATION RATE: 12 BRPM | OXYGEN SATURATION: 96 % | TEMPERATURE: 97.4 F | HEART RATE: 66 BPM | WEIGHT: 204.8 LBS | DIASTOLIC BLOOD PRESSURE: 81 MMHG | SYSTOLIC BLOOD PRESSURE: 133 MMHG | BODY MASS INDEX: 37.69 KG/M2

## 2018-10-11 DIAGNOSIS — J02.9 SORE THROAT: Primary | ICD-10-CM

## 2018-10-11 DIAGNOSIS — J40 BRONCHITIS: ICD-10-CM

## 2018-10-11 LAB
S PYO AG THROAT QL: NEGATIVE
VALID INTERNAL CONTROL?: YES

## 2018-10-11 RX ORDER — PSEUDOEPHEDRINE HCL 30 MG
30 TABLET ORAL
Qty: 20 TAB | Refills: 0
Start: 2018-10-11 | End: 2019-09-05 | Stop reason: ALTCHOICE

## 2018-10-11 RX ORDER — BENZONATATE 200 MG/1
200 CAPSULE ORAL
Qty: 20 CAP | Refills: 0 | Status: SHIPPED | OUTPATIENT
Start: 2018-10-11 | End: 2018-10-18

## 2018-10-11 RX ORDER — BENZONATATE 200 MG/1
200 CAPSULE ORAL
Qty: 30 CAP | Refills: 0
Start: 2018-10-11 | End: 2018-10-11 | Stop reason: SDUPTHER

## 2018-10-11 RX ORDER — AZITHROMYCIN 250 MG/1
250 TABLET, FILM COATED ORAL SEE ADMIN INSTRUCTIONS
Qty: 6 TAB | Refills: 0 | Status: SHIPPED | OUTPATIENT
Start: 2018-10-11 | End: 2018-10-16

## 2018-10-11 NOTE — PROGRESS NOTES
HISTORY OF PRESENT ILLNESS  Sintia Woo is a 68 y.o. female. HPI  Upper respiratory illness:  Sintia Woo presents with complaints of congestion, sore throat, cough described as minimally productive initially but has been becoming looser, headache and yellow nasal discharge for 5 days. felt like she had a cold 2 weeks ago and symptoms improved but then started to feel symptoms return. no nausea and no vomiting . she has not had  myalgias, fever and chills. Symptoms are moderate. Over-the-counter remedies  has never been tried. She is leaving to fly to [place], [place] this Sunday to baby sit her grandchildren and is concerned about being sick and out of town. Drinking plenty of fluids: yes  Asthma?:  no  former smoker, quit  years ago  Contacts with similar infections: no         Review of Systems   Constitutional: Positive for malaise/fatigue. Negative for chills and fever. HENT: Positive for congestion and sore throat. Negative for ear pain. Respiratory: Positive for cough and sputum production. Negative for shortness of breath. Cardiovascular: Negative for chest pain and palpitations. Gastrointestinal: Negative for nausea and vomiting. Genitourinary: Negative for dysuria and frequency. Musculoskeletal: Negative for myalgias. Neurological: Positive for headaches. Negative for dizziness. /81 (BP 1 Location: Left arm, BP Patient Position: Sitting)  Pulse 66  Temp 97.4 °F (36.3 °C) (Oral)   Resp 12  Ht 5' 2\" (1.575 m)  Wt 204 lb 12.8 oz (92.9 kg)  SpO2 96%  BMI 37.46 kg/m2  Physical Exam   Constitutional: She is oriented to person, place, and time. She appears well-developed and well-nourished. HENT:   Head: Normocephalic and atraumatic. Right Ear: External ear normal.   Left Ear: External ear normal.   Nose: Mucosal edema present. Right sinus exhibits no maxillary sinus tenderness. Left sinus exhibits no maxillary sinus tenderness.    Mouth/Throat: Posterior oropharyngeal erythema present. No posterior oropharyngeal edema. Neck: Normal range of motion. Neck supple. No thyromegaly present. Cardiovascular: Normal rate and regular rhythm. Pulmonary/Chest: Effort normal. She has no wheezes. She has no rales. Upper chest congestion   Lymphadenopathy:     She has no cervical adenopathy. Neurological: She is alert and oriented to person, place, and time. Psychiatric: She has a normal mood and affect. Her behavior is normal.   Nursing note and vitals reviewed. ASSESSMENT and PLAN  Diagnoses and all orders for this visit:    1. Sore throat  -     AMB POC RAPID STREP A - negative    2. Bronchitis  -     azithromycin (ZITHROMAX) 250 mg tablet; Take 1 Tab by mouth See Admin Instructions for 5 days. -     chlorpheniramine-dm (CORICIDIN HBP COUGH AND COLD) 4-30 mg tab; Take as directed  -     pseudoephedrine (SUDAFED) 30 mg tablet; Take 1 Tab by mouth every six (6) hours as needed for Congestion.  -     benzonatate (TESSALON) 200 mg capsule; Take 1 Cap by mouth three (3) times daily as needed for Cough for up to 7 days.       lab results and schedule of future lab studies reviewed with patient  reviewed diet, exercise and weight control  reviewed medications and side effects in detail

## 2018-10-11 NOTE — PATIENT INSTRUCTIONS
Bronchitis: Care Instructions  Your Care Instructions    Bronchitis is inflammation of the bronchial tubes, which carry air to the lungs. The tubes swell and produce mucus, or phlegm. The mucus and inflamed bronchial tubes make you cough. You may have trouble breathing. Most cases of bronchitis are caused by viruses like those that cause colds. Antibiotics usually do not help and they may be harmful. Bronchitis usually develops rapidly and lasts about 2 to 3 weeks in otherwise healthy people. Follow-up care is a key part of your treatment and safety. Be sure to make and go to all appointments, and call your doctor if you are having problems. It's also a good idea to know your test results and keep a list of the medicines you take. How can you care for yourself at home? · Take all medicines exactly as prescribed. Call your doctor if you think you are having a problem with your medicine. · Get some extra rest.  · Take an over-the-counter pain medicine, such as acetaminophen (Tylenol), ibuprofen (Advil, Motrin), or naproxen (Aleve) to reduce fever and relieve body aches. Read and follow all instructions on the label. · Do not take two or more pain medicines at the same time unless the doctor told you to. Many pain medicines have acetaminophen, which is Tylenol. Too much acetaminophen (Tylenol) can be harmful. · Take an over-the-counter cough medicine that contains dextromethorphan to help quiet a dry, hacking cough so that you can sleep. Avoid cough medicines that have more than one active ingredient. Read and follow all instructions on the label. · Breathe moist air from a humidifier, hot shower, or sink filled with hot water. The heat and moisture will thin mucus so you can cough it out. · Do not smoke. Smoking can make bronchitis worse. If you need help quitting, talk to your doctor about stop-smoking programs and medicines. These can increase your chances of quitting for good.   When should you call for help? Call 911 anytime you think you may need emergency care. For example, call if:    · You have severe trouble breathing.    Call your doctor now or seek immediate medical care if:    · You have new or worse trouble breathing.     · You cough up dark brown or bloody mucus (sputum).     · You have a new or higher fever.     · You have a new rash.    Watch closely for changes in your health, and be sure to contact your doctor if:    · You cough more deeply or more often, especially if you notice more mucus or a change in the color of your mucus.     · You are not getting better as expected. Where can you learn more? Go to http://sammy-lainey.info/. Enter H333 in the search box to learn more about \"Bronchitis: Care Instructions. \"  Current as of: December 6, 2017  Content Version: 11.8  © 1927-8729 [employer]. Care instructions adapted under license by [employer] (which disclaims liability or warranty for this information). If you have questions about a medical condition or this instruction, always ask your healthcare professional. [address] any warranty or liability for your use of this information.

## 2018-10-11 NOTE — MR AVS SNAPSHOT
[address] [facility] [address] 
 
 
 [address] [facility] [address] [facility] 
[phone] Patient: Pepper Florentino MRN: [id number] SRO:9/06/[birth year] Visit Information Date & Time Provider Department Dept. Phone Encounter #  
 10/11/2018  8:20 AM Renny Flores NP [facility] [address] [facility] [id number] Your Appointments 10/26/2018  8:40 AM  
ROUTINE CARE with Miki Lai MD  
[facility] Appt Note: 6 mo fu/ labs [address] [facility] [address] Jacqu[address] Gifford [address]  
  
   
 [address] [facility]  
  
    
 1/2/2019  4:20 PM  
Any with Brisa Terry MD  
[address] [place] [address] ([facility]) Appt Note: 1 month f/u,bring device [employer] [address] [place] [address] Upcoming Health Maintenance Date Due  
 FOOT EXAM Q1 6/20/1955 EYE EXAM RETINAL OR DILATED Q1 6/20/1955 Shingrix Vaccine Age 50> (1 of 2) 6/20/1995 FOBT Q 1 YEAR AGE 50-75 6/20/1995 GLAUCOMA SCREENING Q2Y 6/20/2010 Bone Densitometry (Dexa) Screening 6/20/2010 MEDICARE YEARLY EXAM 7/22/2018 Influenza Age 5 to Adult 8/1/2018 HEMOGLOBIN A1C Q6M 12/5/2018 MICROALBUMIN Q1 1/18/2019 LIPID PANEL Q1 1/18/2019 BREAST CANCER SCRN MAMMOGRAM 4/19/2019 DTaP/Tdap/Td series (2 - Td) 1/15/2025 Allergies as of 10/11/2018  Review Complete On: 10/11/2018 By: Renny Flores NP Severity Noted Reaction Type Reactions Adhesive  12/20/2013    Rash, Itching Advil [Ibuprofen]  12/20/2013    Swelling FEET SWELL Ceftin [Cefuroxime Axetil]  12/20/2013    Itching Lotensin [Benazepril]  12/20/2013    Cough Penicillins  12/20/2013    Swelling Sulfa (Sulfonamide Antibiotics)  12/20/2013    Rash, Itching, Swelling Current Immunizations  Never Reviewed Name Date Influenza High Dose Vaccine PF 10/1/2016, 9/22/2015 Pneumococcal Conjugate (PCV-13) 10/17/2016 Pneumococcal Polysaccharide (PPSV-23) 11/30/2010 Tdap 1/15/2015 Zoster Vaccine, Live 7/27/2011 Not reviewed this visit You Were Diagnosed With   
  
 Codes Comments Sore throat    -  Primary ICD-10-CM: J02.9 ICD-9-CM: 804 Bronchitis     ICD-10-CM: J40 ICD-9-CM: 487 Vitals BP Pulse Temp Resp Height(growth percentile) Weight(growth percentile) 133/81 (BP 1 Location: Left arm, BP Patient Position: Sitting) 66 97.4 °F (36.3 °C) (Oral) 12 5' 2\" (1.575 m) 204 lb 12.8 oz (92.9 kg) SpO2 BMI OB Status Smoking Status 96% 37.46 kg/m2 Postmenopausal Former Smoker BMI and BSA Data Body Mass Index Body Surface Area  
 37.46 kg/m 2 2.02 m 2 Preferred Pharmacy Pharmacy Name Phone [id number]- [place], [address] [facility] [phone] Your Updated Medication List  
  
   
This list is accurate as of 10/11/18  8:56 AM.  Always use your most recent med list.  
  
  
  
  
 aspirin, buffered 81 mg Tab Take  by mouth daily. azithromycin 250 mg tablet Commonly known as:  Barbara Greenhouse Take 1 Tab by mouth See Admin Instructions for 5 days. benzonatate 200 mg capsule Commonly known as:  TESSALON Take 1 Cap by mouth three (3) times daily as needed for Cough for up to 7 days. * Blood Sugar Diagnostic, Drum Strp Commonly known as:  ACCU-CHEK COMPACT PLUS TEST  
USE TO TEST BLOOD SUGAR 4 TIMES DAILY. DX E11.9  
  
 * ACCU-CHEK COMPACT PLUS TEST Strp Generic drug:  Blood Sugar Diagnostic, Drum USE TO TEST BLOOD SUGAR 4 TIMES DAILY. DX E11.9 chlorpheniramine-dm 4-30 mg Tab Commonly known as:  CORICIDIN HBP COUGH AND COLD Take as directed  
  
 cholecalciferol 1,000 unit Cap Commonly known as:  VITAMIN D3  
 Take 1 Cap by mouth nightly. HumaLOG KwikPen Insulin 100 unit/mL kwikpen Generic drug:  insulin lispro  
  
 insulin glargine 100 unit/mL injection Commonly known as:  LANTUS U-100 INSULIN  
20 Units by SubCUTAneous route two (2) times a day. levothyroxine 25 mcg tablet Commonly known as:  SYNTHROID  
TAKE 1 TABLET BY MOUTH EVERY DAY (BEFORE BREAKFAST)  
  
 losartan 25 mg tablet Commonly known as:  COZAAR  
TAKE 1 TABLET BY MOUTH EVERY DAY  
  
 losartan-hydroCHLOROthiazide 50-12.5 mg per tablet Commonly known as:  HYZAAR  
TAKE 1 TABLET BY MOUTH TWICE A DAY  
  
 LOVAZA 1 gram capsule Generic drug:  omega-3 acid ethyl esters Take 2 g by mouth two (2) times a day. magnesium oxide 400 mg tablet Commonly known as:  MAG-OX  
TAKE 1 TAB BY MOUTH TWO (2) TIMES A DAY. metFORMIN  mg tablet Commonly known as:  GLUCOPHAGE XR  
TAKE 4 TABLETS BY MOUTH EVERY DAY  
  
 metoprolol tartrate 50 mg tablet Commonly known as:  LOPRESSOR Take 0.5 Tabs by mouth two (2) times a day. pseudoephedrine 30 mg tablet Commonly known as:  SUDAFED Take 1 Tab by mouth every six (6) hours as needed for Congestion. sertraline 100 mg tablet Commonly known as:  ZOLOFT  
TAKE 1 TABLET BY MOUTH EVERY DAY IN THE EVENING  
  
 simvastatin 20 mg tablet Commonly known as:  ZOCOR  
TAKE 1 TABLET BY MOUTH EVERY EVENING  
  
 * Notice: This list has 2 medication(s) that are the same as other medications prescribed for you. Read the directions carefully, and ask your doctor or other care provider to review them with you. Prescriptions Sent to Pharmacy Refills  
 azithromycin (ZITHROMAX) 250 mg tablet 0 Sig: Take 1 Tab by mouth See Admin Instructions for 5 days. Class: Normal  
 Pharmacy: [facility]/pharmacy #[id number] #: [phone] Route: Oral  
  
We Performed the Following AMB POC RAPID STREP A [54801 CPT(R)] Patient Instructions Bronchitis: Care Instructions Your Care Instructions Bronchitis is inflammation of the bronchial tubes, which carry air to the lungs. The tubes swell and produce mucus, or phlegm. The mucus and inflamed bronchial tubes make you cough. You may have trouble breathing. Most cases of bronchitis are caused by viruses like those that cause colds. Antibiotics usually do not help and they may be harmful. Bronchitis usually develops rapidly and lasts about 2 to 3 weeks in otherwise healthy people. Follow-up care is a key part of your treatment and safety. Be sure to make and go to all appointments, and call your doctor if you are having problems. It's also a good idea to know your test results and keep a list of the medicines you take. How can you care for yourself at home? · Take all medicines exactly as prescribed. Call your doctor if you think you are having a problem with your medicine. · Get some extra rest. 
· Take an over-the-counter pain medicine, such as acetaminophen (Tylenol), ibuprofen (Advil, Motrin), or naproxen (Aleve) to reduce fever and relieve body aches. Read and follow all instructions on the label. · Do not take two or more pain medicines at the same time unless the doctor told you to. Many pain medicines have acetaminophen, which is Tylenol. Too much acetaminophen (Tylenol) can be harmful. · Take an over-the-counter cough medicine that contains dextromethorphan to help quiet a dry, hacking cough so that you can sleep. Avoid cough medicines that have more than one active ingredient. Read and follow all instructions on the label. · Breathe moist air from a humidifier, hot shower, or sink filled with hot water. The heat and moisture will thin mucus so you can cough it out. · Do not smoke. Smoking can make bronchitis worse. If you need help quitting, talk to your doctor about stop-smoking programs and medicines. These can increase your chances of quitting for good. When should you call for help? Call 911 anytime you think you may need emergency care. For example, call if: 
  · You have severe trouble breathing.  
 Call your doctor now or seek immediate medical care if: 
  · You have new or worse trouble breathing.  
  · You cough up dark brown or bloody mucus (sputum).  
  · You have a new or higher fever.  
  · You have a new rash.  
 Watch closely for changes in your health, and be sure to contact your doctor if: 
  · You cough more deeply or more often, especially if you notice more mucus or a change in the color of your mucus.  
  · You are not getting better as expected. Where can you learn more? Go to http://sammy-lainey.info/. Enter H333 in the search box to learn more about \"Bronchitis: Care Instructions. \" Current as of: December 6, 2017 Content Version: 11.8 © 8280-1640 MTPV. Care instructions adapted under license by [employer] (which disclaims liability or warranty for this information). If you have questions about a medical condition or this instruction, always ask your healthcare professional. [place] [address] any warranty or liability for your use of this information. Introducing [facility]! Miguel Prieto introduces [facility] patient portal. Now you can access parts of your medical record, email your doctor's office, and request medication refills online. 1. In your internet browser, go to [email or web address]. [email or web address]/[email or web address] 2. Click on the First Time User? Click Here link in the Sign In box. You will see the New Member Sign Up page. 3. Enter your [facility] Access Code exactly as it appears below. You will not need to use this code after youve completed the sign-up process. If you do not sign up before the expiration date, you must request a new code. · [facility] Access Code: [email or web address] Expires: 1/9/2019  8:56 AM 
 
4. Enter the last four digits of your Social Security Number (xxxx) and Date of Birth (mm/dd/yyyy) as indicated and click Submit. You will be taken to the next sign-up page. 5. Create a SocialBro ID. This will be your SocialBro login ID and cannot be changed, so think of one that is secure and easy to remember. 6. Create a SocialBro password. You can change your password at any time. 7. Enter your Password Reset Question and Answer. This can be used at a later time if you forget your password. 8. Enter your e-mail address. You will receive e-mail notification when new information is available in [address] 9. Click Sign Up. You can now view and download portions of your medical record. 10. Click the Download Summary menu link to download a portable copy of your medical information. If you have questions, please visit the Frequently Asked Questions section of the SocialBro website. Remember, SocialBro is NOT to be used for urgent needs. For medical emergencies, dial 911. Now available from your iPhone and Android! Please provide this summary of care documentation to your next provider. Your primary care clinician is listed as Frederick Snow. If you have any questions after today's visit, please call [phone].

## 2018-10-12 RX ORDER — LEVOTHYROXINE SODIUM 25 UG/1
TABLET ORAL
Qty: 30 TAB | Refills: 0 | Status: SHIPPED | OUTPATIENT
Start: 2018-10-12 | End: 2018-10-28 | Stop reason: ALTCHOICE

## 2018-10-17 ENCOUNTER — DOCUMENTATION ONLY (OUTPATIENT)
Dept: SLEEP MEDICINE | Age: 73
End: 2018-10-17

## 2018-10-18 ENCOUNTER — TELEPHONE (OUTPATIENT)
Dept: INTERNAL MEDICINE CLINIC | Age: 73
End: 2018-10-18

## 2018-10-18 NOTE — TELEPHONE ENCOUNTER
The pharmacy has faxed over request for a refill on her levothyroxine (SYNTHROID) 25 mcg tablet  And she needs it called in today  [facility] [phone]  Her no is [phone]

## 2018-10-24 RX ORDER — SIMVASTATIN 20 MG/1
TABLET, FILM COATED ORAL
Qty: 90 TAB | Refills: 0 | Status: SHIPPED | OUTPATIENT
Start: 2018-10-24 | End: 2019-01-19 | Stop reason: SDUPTHER

## 2018-10-28 RX ORDER — LEVOTHYROXINE SODIUM 25 UG/1
TABLET ORAL
Qty: 30 TAB | Refills: 0 | Status: SHIPPED | OUTPATIENT
Start: 2018-10-28 | End: 2018-11-10 | Stop reason: SDUPTHER

## 2018-11-03 DIAGNOSIS — I10 ESSENTIAL HYPERTENSION: ICD-10-CM

## 2018-11-03 DIAGNOSIS — E11.21 TYPE II DIABETES MELLITUS WITH NEPHROPATHY (HCC): ICD-10-CM

## 2018-11-04 RX ORDER — LOSARTAN POTASSIUM AND HYDROCHLOROTHIAZIDE 12.5; 5 MG/1; MG/1
TABLET ORAL
Qty: 180 TAB | Refills: 0 | Status: SHIPPED | OUTPATIENT
Start: 2018-11-04 | End: 2019-02-01 | Stop reason: SDUPTHER

## 2018-11-04 RX ORDER — LOSARTAN POTASSIUM 25 MG/1
TABLET ORAL
Qty: 90 TAB | Refills: 0 | Status: SHIPPED | OUTPATIENT
Start: 2018-11-04 | End: 2018-12-08 | Stop reason: ALTCHOICE

## 2018-11-07 ENCOUNTER — HOSPITAL ENCOUNTER (OUTPATIENT)
Dept: LAB | Age: 73
Discharge: HOME | End: 2018-11-07
Payer: MEDICARE

## 2018-11-07 ENCOUNTER — OFFICE VISIT (OUTPATIENT)
Dept: INTERNAL MEDICINE CLINIC | Age: 73
End: 2018-11-07

## 2018-11-07 VITALS
HEIGHT: 62 IN | DIASTOLIC BLOOD PRESSURE: 76 MMHG | WEIGHT: 204.4 LBS | OXYGEN SATURATION: 97 % | SYSTOLIC BLOOD PRESSURE: 130 MMHG | BODY MASS INDEX: 37.61 KG/M2 | RESPIRATION RATE: 12 BRPM | HEART RATE: 62 BPM | TEMPERATURE: 98.1 F

## 2018-11-07 DIAGNOSIS — E78.2 MIXED HYPERLIPIDEMIA: ICD-10-CM

## 2018-11-07 DIAGNOSIS — E11.29 CONTROLLED TYPE 2 DIABETES MELLITUS WITH MICROALBUMINURIA, WITH LONG-TERM CURRENT USE OF INSULIN (HCC): Primary | ICD-10-CM

## 2018-11-07 DIAGNOSIS — E03.9 ACQUIRED HYPOTHYROIDISM: ICD-10-CM

## 2018-11-07 DIAGNOSIS — J06.9 VIRAL URI: ICD-10-CM

## 2018-11-07 DIAGNOSIS — I10 ESSENTIAL HYPERTENSION: ICD-10-CM

## 2018-11-07 DIAGNOSIS — R80.9 CONTROLLED TYPE 2 DIABETES MELLITUS WITH MICROALBUMINURIA, WITH LONG-TERM CURRENT USE OF INSULIN (HCC): Primary | ICD-10-CM

## 2018-11-07 DIAGNOSIS — Z79.4 CONTROLLED TYPE 2 DIABETES MELLITUS WITH MICROALBUMINURIA, WITH LONG-TERM CURRENT USE OF INSULIN (HCC): Primary | ICD-10-CM

## 2018-11-07 PROCEDURE — 84443 ASSAY THYROID STIM HORMONE: CPT

## 2018-11-07 PROCEDURE — 80061 LIPID PANEL: CPT

## 2018-11-07 PROCEDURE — 80053 COMPREHEN METABOLIC PANEL: CPT

## 2018-11-07 PROCEDURE — 82043 UR ALBUMIN QUANTITATIVE: CPT

## 2018-11-07 PROCEDURE — 36415 COLL VENOUS BLD VENIPUNCTURE: CPT

## 2018-11-07 NOTE — LETTER
11/10/2018 4:30 PM 
 
[facility] [address] [facility] [facility] [phone] Dear Eliseo Chol: 
 
Please find your most recent results below. Resulted Orders LIPID PANEL Result Value Ref Range Cholesterol, total 165 100 - 199 mg/dL Triglyceride 203 (H) 0 - 149 mg/dL HDL Cholesterol 59 >39 mg/dL VLDL, calculated 41 (H) 5 - 40 mg/dL LDL, calculated 65 0 - 99 mg/dL Narrative Performed at:  [address]  [id number] : Josiah Wilcox MD, Phone:  [phone] METABOLIC PANEL, COMPREHENSIVE Result Value Ref Range Glucose 181 (H) 65 - 99 mg/dL BUN 27 8 - 27 mg/dL Creatinine 0.96 0.57 - 1.00 mg/dL GFR est non-AA 59 (L) >59 mL/min/1.73 GFR est AA 68 >59 mL/min/1.73  
 BUN/Creatinine ratio 28 12 - 28 Sodium 138 134 - 144 mmol/L Potassium 4.3 3.5 - 5.2 mmol/L Chloride 100 96 - 106 mmol/L  
 CO2 23 20 - 29 mmol/L Calcium 9.6 8.7 - 10.3 mg/dL Protein, total 6.8 6.0 - 8.5 g/dL Albumin 4.5 3.5 - 4.8 g/dL GLOBULIN, TOTAL 2.3 1.5 - 4.5 g/dL A-G Ratio 2.0 1.2 - 2.2 Bilirubin, total 0.5 0.0 - 1.2 mg/dL Alk. phosphatase 95 39 - 117 IU/L  
 AST (SGOT) 32 0 - 40 IU/L  
 ALT (SGPT) 36 (H) 0 - 32 IU/L Narrative Performed at:  [address]  [id number] : Josiah Wilcox MD, Phone:  [phone] MICROALBUMIN, UR, RAND W/ MICROALB/CREAT RATIO Result Value Ref Range Creatinine, urine 83.7 Not Estab. mg/dL Microalbumin, urine 391.7 Not Estab. ug/mL Microalb/Creat ratio (ug/mg creat.) 468.0 (H) 0.0 - 30.0 mg/g creat Comment:  
                        Normal:                0.0 -  30.0 Albuminuria:          31.0 - 300.0 Clinical albuminuria:       >300.0 Narrative Performed at:  [address]  [id number] : Andrea Baltazar MD, Phone:  [phone] TSH 3RD GENERATION Result Value Ref Range TSH 5.240 (H) 0.450 - 4.500 uIU/mL Narrative Performed at:  [address]  [id number] : Andrea Baltazar MD, Phone:  [phone] CVD REPORT Result Value Ref Range INTERPRETATION Note Comment:  
   Supplemental report is available. PDF IMAGE Not applicable Narrative Performed at:  [address] [place] [address] [address] [place] [address]  [id number] : Inessa Reid MD, Phone:  [phone] CKD REPORT Result Value Ref Range Interpretation Note Comment:  
   ------------------------------- 
CHRONIC KIDNEY DISEASE: 
EGFR, BLOOD PRESSURE, AND PROTEINURIA ASSESSMENT Patient's eGFR was previously outside the scope of the 
program and now is 59 mL/min/1.73mE2 corresponding to CKD 
stage 3a. Multiply eGFR by 1.159 if patient is African American. Potassium is within goal and has not changed 
significantly, was 4.5 and now is 4.3 mmol/L. Albumin:Creatinine ratio is elevated and has risen, was 
214.6 and now is 468.0 mg/g creat. ADA recommends hemoglobin A1C should be obtained at least twice a year in a diabetic. EGFR, BLOOD PRESSURE, AND PROTEINURIA TREATMENT SUGGESTIONS 
- Based upon current eGFR and presence of severe proteinuria, 
patient is at very high risk for adverse outcomes such as 
CKD progression, cardiovascular disease, and mortality. Guidelines suggest a target blood pressure of 130/80 mmHg or 
less in patients with albuminuria or proteinuria to reduce 
cardiovascular risk and CKD progression. A higher blood 
pressure target ma 
y be appropriate in older individuals to 
avoid symptomatic hypotension. Proteinuria generally 
warrants use of ACEI or ARB to slow CKD progression. Weight 
loss and optimal glycemic control (in diabetes) are helpful 
in reducing proteinuria. EGFR, BLOOD PRESSURE, AND PROTEINURIA FOLLOW-UP 
- Spot Urine Panel (Albumin preferred) and fasting Renal Panel 
within 6 months; 
- 
BONE and MINERAL ASSESSMENT Calcium is within goal and has not changed significantly, 
was 9.7 and now is 9.6 mg/dL. Carbon Dioxide is within goal 
and has not changed significantly, was 24 and now is 23 
mmol/L. Guidelines recommend the measurement of 25-hydroxy 
vitamin D in patients with CKD. BONE and MINERAL TREATMENT SUGGESTIONS 
- Interpretations require simultaneous measurements of serum 
calcium and phosphorus. BONE and MINERAL FOLLOW-UP 
- 
fasting PTH with Renal Panel is recommended by KDOQI 
guidelines, at least yearly; 25-Hydroxy Vitamin D is due; 
- 
LIPIDS ASSESSMENT 
LDL-C is optimal and has not 
 changed significantly, was 72 
and now is 65 mg/dL. Triglyceride is high and has risen, was 181 and now is 203 mg/dL. Non-HDL Cholesterol is normal and 
has not changed significantly, was 108 and now is 106 mg/dL. HDL-C is normal and has risen, was 54 and now is 59 mg/dL. LIPIDS TREATMENT SUGGESTIONS 
- Therapeutic lifestyle changes are always valuable to 
maintain optimal blood lipid status (diet, exercise, weight 
management). Continue statin if in use. Consider measurement 
of LDL particle number or Apo B to adjudicate need for 
further LDL lowering therapy. If statin cannot be tolerated 
or increased, alternatives include use of an intestinal 
agent (ezetimibe or bile acid sequestrant), niacin, and/or 
fish oil. LIPIDS FOLLOW-UP 
- 
fasting Lipid Panel within 12 months; 
- 
ANEMIA ASSESSMENT Most recent order does not include a CBC Panel or iron 
studies. ------------------------------- DISCLAIMER These assessments and treatment suggestions are provided as 
a convenience  
in support of the physician-patient 
relationship and are not intended to replace the physician's 
clinical judgment. They are derived from national guidelines in addition to other evidence and expert opinion. The 
clinician should consider this information within the 
context of clinical opinion and the individual patient. SEE GUIDANCE FOR CHRONIC KIDNEY DISEASE PROGRAM: Kidney Disease Improving Global Outcomes (KDIGO) clinical practice 
guidelines are at http://kdigo. org/home/guidelines/. 
[facility] [facility] Kidney Disease Outcomes Quality Initiative (KDOQI (TM)), with its limitations and 
disclaimers, are at www.kidney. org/professionals/KDOQI. This 
program is intended for patients who have been diagnosed 
with stages 3, 4, or pre-dialysis 5 CKD. It is not intended 
for children, pregnant patients, or transplant patients. Narrative Performed at:  [address] [place] [address]  [id number] : Sabrina Ruby MD, Phone:  [phone] DIABETES PATIENT EDUCATION Result Value Ref Range PDF Image Not applicable Narrative Performed at:  [address] [place] [address]  [id number] : Sabrina Ruby MD, Phone:  [phone] RECOMMENDATIONS: 
Thank you for coming in to get your labs. I increased your thyroid medication because it appears you were hypothyroid. Also I noticed you still have some protein in your urine. We can try you on losartan 25 mg x 2 tabs to see if this helps. If you feel lightheaded or dizzy you should decrease it back to 25 mg one tablet. Please continue your losartan/hctz as it is too. Sincerely, Miki Lai MD

## 2018-11-07 NOTE — LETTER
11/10/2018 3:26 PM 
 
Ms. Keke Pennington [address] [phone] Dear Keke Katherines: 
 
Please find your most recent results below. Resulted Orders LIPID PANEL Result Value Ref Range Cholesterol, total 165 100 - 199 mg/dL Triglyceride 203 (H) 0 - 149 mg/dL HDL Cholesterol 59 >39 mg/dL VLDL, calculated 41 (H) 5 - 40 mg/dL LDL, calculated 65 0 - 99 mg/dL Narrative Performed at:  [address]  [id number] : Lauren Ríos MD, Phone:  [phone] METABOLIC PANEL, COMPREHENSIVE Result Value Ref Range Glucose 181 (H) 65 - 99 mg/dL BUN 27 8 - 27 mg/dL Creatinine 0.96 0.57 - 1.00 mg/dL GFR est non-AA 59 (L) >59 mL/min/1.73 GFR est AA 68 >59 mL/min/1.73  
 BUN/Creatinine ratio 28 12 - 28 Sodium 138 134 - 144 mmol/L Potassium 4.3 3.5 - 5.2 mmol/L Chloride 100 96 - 106 mmol/L  
 CO2 23 20 - 29 mmol/L Calcium 9.6 8.7 - 10.3 mg/dL Protein, total 6.8 6.0 - 8.5 g/dL Albumin 4.5 3.5 - 4.8 g/dL GLOBULIN, TOTAL 2.3 1.5 - 4.5 g/dL A-G Ratio 2.0 1.2 - 2.2 Bilirubin, total 0.5 0.0 - 1.2 mg/dL Alk. phosphatase 95 39 - 117 IU/L  
 AST (SGOT) 32 0 - 40 IU/L  
 ALT (SGPT) 36 (H) 0 - 32 IU/L Narrative Performed at:  [address]  [id number] : Lauren Ríos MD, Phone:  [phone] MICROALBUMIN, UR, RAND W/ MICROALB/CREAT RATIO Result Value Ref Range Creatinine, urine 83.7 Not Estab. mg/dL Microalbumin, urine 391.7 Not Estab. ug/mL Microalb/Creat ratio (ug/mg creat.) 468.0 (H) 0.0 - 30.0 mg/g creat Comment:  
                        Normal:                0.0 -  30.0 Albuminuria:          31.0 - 300.0 Clinical albuminuria:       >300.0 Narrative Performed at:  [address]  [id number] : Christiano Escudero MD, Phone:  [phone] TSH 3RD GENERATION Result Value Ref Range TSH 5.240 (H) 0.450 - 4.500 uIU/mL Narrative Performed at:  [address]  [id number] : Christiano Escudero MD, Phone:  [phone] CVD REPORT Result Value Ref Range INTERPRETATION Note Comment:  
   Supplemental report is available. PDF IMAGE Not applicable Narrative Performed at:  [address] [place] [address] [address] [place] [address]  [id number] : Jossy Vidal MD, Phone:  [phone] CKD REPORT Result Value Ref Range Interpretation Note Comment:  
   ------------------------------- 
CHRONIC KIDNEY DISEASE: 
EGFR, BLOOD PRESSURE, AND PROTEINURIA ASSESSMENT Patient's eGFR was previously outside the scope of the 
program and now is 59 mL/min/1.73mE2 corresponding to CKD 
stage 3a. Multiply eGFR by 1.159 if patient is African American. Potassium is within goal and has not changed 
significantly, was 4.5 and now is 4.3 mmol/L. Albumin:Creatinine ratio is elevated and has risen, was 
214.6 and now is 468.0 mg/g creat. ADA recommends hemoglobin A1C should be obtained at least twice a year in a diabetic. EGFR, BLOOD PRESSURE, AND PROTEINURIA TREATMENT SUGGESTIONS 
- Based upon current eGFR and presence of severe proteinuria, 
patient is at very high risk for adverse outcomes such as 
CKD progression, cardiovascular disease, and mortality. Guidelines suggest a target blood pressure of 130/80 mmHg or 
less in patients with albuminuria or proteinuria to reduce 
cardiovascular risk and CKD progression. A higher blood 
pressure target ma 
y be appropriate in older individuals to 
avoid symptomatic hypotension. Proteinuria generally 
warrants use of ACEI or ARB to slow CKD progression. Weight 
loss and optimal glycemic control (in diabetes) are helpful 
in reducing proteinuria. EGFR, BLOOD PRESSURE, AND PROTEINURIA FOLLOW-UP 
- Spot Urine Panel (Albumin preferred) and fasting Renal Panel 
within 6 months; 
- 
BONE and MINERAL ASSESSMENT Calcium is within goal and has not changed significantly, 
was 9.7 and now is 9.6 mg/dL. Carbon Dioxide is within goal 
and has not changed significantly, was 24 and now is 23 
mmol/L. Guidelines recommend the measurement of 25-hydroxy 
vitamin D in patients with CKD. BONE and MINERAL TREATMENT SUGGESTIONS 
- Interpretations require simultaneous measurements of serum 
calcium and phosphorus. BONE and MINERAL FOLLOW-UP 
- 
fasting PTH with Renal Panel is recommended by KDOQI 
guidelines, at least yearly; 25-Hydroxy Vitamin D is due; 
- 
LIPIDS ASSESSMENT 
LDL-C is optimal and has not 
 changed significantly, was 72 
and now is 65 mg/dL. Triglyceride is high and has risen, was 181 and now is 203 mg/dL. Non-HDL Cholesterol is normal and 
has not changed significantly, was 108 and now is 106 mg/dL. HDL-C is normal and has risen, was 54 and now is 59 mg/dL. LIPIDS TREATMENT SUGGESTIONS 
- Therapeutic lifestyle changes are always valuable to 
maintain optimal blood lipid status (diet, exercise, weight 
management). Continue statin if in use. Consider measurement 
of LDL particle number or Apo B to adjudicate need for 
further LDL lowering therapy. If statin cannot be tolerated 
or increased, alternatives include use of an intestinal 
agent (ezetimibe or bile acid sequestrant), niacin, and/or 
fish oil. LIPIDS FOLLOW-UP 
- 
fasting Lipid Panel within 12 months; 
- 
ANEMIA ASSESSMENT Most recent order does not include a CBC Panel or iron 
studies. ------------------------------- DISCLAIMER These assessments and treatment suggestions are provided as 
a convenience  
in support of the physician-patient 
relationship and are not intended to replace the physician's 
clinical judgment. They are derived from national guidelines in addition to other evidence and expert opinion. The 
clinician should consider this information within the 
context of clinical opinion and the individual patient. SEE GUIDANCE FOR CHRONIC KIDNEY DISEASE PROGRAM: Kidney Disease Improving Global Outcomes (KDIGO) clinical practice 
guidelines are at http://kdigo. org/home/guidelines/. 
[facility] [facility] Kidney Disease Outcomes Quality Initiative (KDOQI (TM)), with its limitations and 
disclaimers, are at www.kidney. org/professionals/KDOQI. This 
program is intended for patients who have been diagnosed 
with stages 3, 4, or pre-dialysis 5 CKD. It is not intended 
for children, pregnant patients, or transplant patients. Narrative Performed at:  [address] [place] [address]  [id number] : Cecilio Collins MD, Phone:  [phone] DIABETES PATIENT EDUCATION Result Value Ref Range PDF Image Not applicable Narrative Performed at:  [address] [place] [address]  [id number] : Cecilio Collins MD, Phone:  [phone] RECOMMENDATIONS: 
 
 
Sincerely, Jewels Lund MD

## 2018-11-07 NOTE — PROGRESS NOTES
Ashlie Leonard is a 68 y.o. female and presents with Hypertension and Diabetes Mayra Maldonado Patient presents for follow-up with regards to her hypertension and cholesterol. Since last visit she has been seen by Dr. Mustapha Vitale and was seen by podiatry for an ingrown toenail. Congestion Patient reports she had symptoms of upper respiratory infection prior to October 11. She was seen by Reva Michelle who prescribed a Z-Juan A and patient went to [place] to care for her grandkids. She reports her symptoms are better but congestion lingers. She feels pressure in her head and nasal congestionShe reports cold sx were green but now clear or white. She is sleeping at night. She got 6 hours last night. She feels well rested in am but tired in the afternoon. After resting she can rally again. No weight loss, no fever, no sob. She is concerned it is still lingering. She did not get her flu shot be Diabetes  
bs are running high from ingrown toenail and uri. She was seen by dr. Mustapha Vitale. Pt reports her a1c was 7.8 at last check. This was done in September She reports she has not received her high-dose flu shot yet. She is taking 46 units Lantus in the evening but was recommended to increase to the 50 units based on her last A1c She denies hypoglycemia Dr. Syed Chapa + cataract, no diabetic retinopathy, Dec or Jan (opthal) Podiatry 9 months ago becaseu of ingrown toenail.   
humolag 8 units tid baseline but will increase Subjective:  
Ashlie Leonard is a 68 y.o. female with hypertension. Hypertension ROS: taking medications as instructed, no medication side effects noted, no TIA's, no chest pain on exertion, no dyspnea on exertion, no swelling of ankles. New concerns she is tolerating her losartan 50/12.5 twice a day She denies edema. She has not been taking any over-the-counter medications for her congestion but thinks her blood pressure is elevated because of lack of sleep. Sleep apnea Saw Dr. Medardo Su, pt is on CPAP machine SUBJECTIVE: Mary Lou Saldaña is a 68 y.o. female here for follow up of hypothyroidism. Lab Results Component Value Date/Time TSH 3.720 04/06/2017 09:29 AM  
 
Thyroid ROS: denies fatigue, weight changes, heat/cold intolerance, bowel/skin changes or CVS symptoms. [address] We did not discuss. She will go to [place] after Thanksgiving to be with her family. She will go to a wedding and she has a great aunt of the ride. CAD She is follwed by Dr. Juana Fiore She is going 3 times per week but prob 2 x/week No cp or sob at rest. She does have some sob exertion stairs. She is a little winded after 8-10 stairs but continues to push forward. Dr. Ofelia Slater did echo 2017 and evaluated fro PVD No se of cholesterol medication, simva 20 mg Review of Systems Constitutional: negative for fevers, chills, anorexia and weight loss Eyes:   negative for visual disturbance and irritation ENT:   negative for tinnitus,sore throat,nasal congestion,ear pains. hoarseness Respiratory:  negative for cough, hemoptysis, dyspnea,wheezing CV:   negative for chest pain, palpitations, lower extremity edema GI:   negative for nausea, vomiting, diarrhea, abdominal pain,melena Endo:               negative for polyuria,polydipsia,polyphagia,heat intolerance Genitourinary: negative for frequency, dysuria and hematuria Integument:  negative for rash and pruritus Hematologic:  negative for easy bruising and gum/nose bleeding Musculoskel: negative for myalgias, arthralgias, back pain, muscle weakness, joint pain Neurological:  negative for headaches, dizziness, vertigo, memory problems and gait Behavl/Psych: negative for feelings of anxiety, depression, mood changes Past Medical History:  
Diagnosis Date  Arthritis  CAD (coronary artery disease) 2006 STENT PLACED>PLAQUE BROKE OFF--HAD \"MINOR MI\"  Deviated septum 12/20/13 HAS TROUBLE BREATHING THROUGH LEFT SIDE; CT SHOWED POLYP  Diabetes ([address]  Heart disease  Hypertension  Incontinence  Nasal polyp  Unspecified sleep apnea 13 CAN'T TOLERATE CPAP Past Surgical History:  
Procedure Laterality Date  CARDIAC SURG PROCEDURE UNLIST   CARDIAC CATH;ANGIOPLASTY WITH STENT  
 HX  SECTION    
 X3  
 HX COLONOSCOPY   [address] SEPTOPLASTY Social History Socioeconomic History  Marital status:  Spouse name: Not on file  Number of children: Not on file  Years of education: Not on file  Highest education level: Not on file Social Needs  Financial resource strain: Not on file  Food insecurity - worry: Not on file  Food insecurity - inability: Not on file  Transportation needs - medical: Not on file  Transportation needs - non-medical: Not on file Occupational History  Not on file Tobacco Use  Smoking status: Former Smoker Packs/day: 1.00 Years: 5.00 Pack years: 5.00 Last attempt to quit: 1970 Years since quittin.9  Smokeless tobacco: Never Used Substance and Sexual Activity  Alcohol use: Yes Comment: \"VERY LITTLE ALCOHOL\"  Drug use: No  
 Sexual activity: Not on file Other Topics Concern  Not on file Social History Narrative 3 children women 40, 44, 37 healthy other than skin issues Family History Problem Relation Age of Onset  Heart Disease Mother  Heart Surgery Mother VALVE REPLACED AT AGE 72  
 Heart Attack Father 46 MASSIVE  Anesth Problems Neg Hx Current Outpatient Medications Medication Sig Dispense Refill  losartan-hydroCHLOROthiazide (HYZAAR) 50-12.5 mg per tablet TAKE 1 TABLET BY MOUTH TWICE A  Tab 0  
 losartan (COZAAR) 25 mg tablet TAKE 1 TABLET BY MOUTH EVERY DAY 90 Tab 0  
  levothyroxine (SYNTHROID) 25 mcg tablet TAKE 1 TABLET BY MOUTH EVERY DAY (BEFORE BREAKFAST) 30 Tab 0  
 simvastatin (ZOCOR) 20 mg tablet TAKE 1 TABLET BY MOUTH EVERY DAY IN THE EVENING 90 Tab 0  ACCU-CHEK COMPACT PLUS TEST strp USE TO TEST BLOOD SUGAR 4 TIMES DAILY. DX E11.9 357 Strip 3  
 magnesium oxide (MAG-OX) 400 mg tablet TAKE 1 TAB BY MOUTH TWO (2) TIMES A DAY. 240 Tab 1  
 metoprolol tartrate (LOPRESSOR) 50 mg tablet Take 0.5 Tabs by mouth two (2) times a day. 90 Tab 2  
 sertraline (ZOLOFT) 100 mg tablet TAKE 1 TABLET BY MOUTH EVERY DAY IN THE EVENING 90 Tab 3  Blood Sugar Diagnostic, Drum (ACCU-CHEK COMPACT PLUS TEST) strp USE TO TEST BLOOD SUGAR 4 TIMES DAILY. DX E11.9 357 Strip 0  
 metFORMIN ER (GLUCOPHAGE XR) 500 mg tablet TAKE 4 TABLETS BY MOUTH EVERY DAY  3  
 insulin glargine (LANTUS) 100 unit/mL injection 20 Units by SubCUTAneous route two (2) times a day. (Patient taking differently: 46 Units by SubCUTAneous route two (2) times a day. 46 in the evening) 1 Vial 3  
 HUMALOG KWIKPEN 100 unit/mL kwikpen   3  Cholecalciferol, Vitamin D3, (VITAMIN D3) 1,000 unit cap Take 1 Cap by mouth nightly. 30 Cap 3  Aspirin, Buffered 81 mg tab Take  by mouth daily.  omega-3 acid ethyl esters (LOVAZA) 1 gram capsule Take 2 g by mouth two (2) times a day.  chlorpheniramine-dm (CORICIDIN HBP COUGH AND COLD) 4-30 mg tab Take as directed 20 Tab 0  pseudoephedrine (SUDAFED) 30 mg tablet Take 1 Tab by mouth every six (6) hours as needed for Congestion. 20 Tab 0 Allergies Allergen Reactions  Adhesive Rash and Itching  Advil [Ibuprofen] Swelling FEET SWELL  Ceftin [Cefuroxime Axetil] Itching  Lotensin [Benazepril] Cough  Penicillins Swelling  Sulfa (Sulfonamide Antibiotics) Rash, Itching and Swelling Objective: 
Visit Vitals /76 (BP 1 Location: Left arm, BP Patient Position: Sitting) Pulse 62 Temp 98.1 °F (36.7 °C) (Oral) Resp 12  5' 2\" (1.575 m) Wt 204 lb 6.4 oz (92.7 kg) SpO2 97% BMI 37.39 kg/m² Physical Exam:  
General appearance - alert, well appearing, and in no distress Mental status - alert, oriented to person, place, and time EYE-SHARAD, EOMI, corneas normal, no foreign bodies, visual acuity normal both eyes, no periorbital cellulitis ENT-ENT exam normal, no neck nodes or sinus tenderness Nose - normal and patent, no erythema, discharge or polyps Mouth - mucous membranes moist, pharynx normal without lesions Neck - supple, no significant adenopathy Chest - clear to auscultation, no wheezes, rales or rhonchi, symmetric air entry Heart - normal rate, regular rhythm, normal S1, S2, no murmurs, rubs, clicks or gallops Abdomen - soft, nontender, nondistended, no masses or organomegaly, obese, no rebound Lymph- no adenopathy palpable Ext-peripheral pulses normal, no pedal edema, no clubbing or cyanosis, second toe on right healing, hematoma at base of phalanges Skin-Warm and dry. no hyperpigmentation, vitiligo, or suspicious lesions, scattered resolved superifical skin bites, no erythema Neuro -alert, oriented, normal speech, no focal findings or movement disorder noted Neck-normal C-spine, no tenderness, full ROM without pain Results for orders placed or performed in visit on 10/11/18 AMB POC RAPID STREP A Result Value Ref Range VALID INTERNAL CONTROL POC Yes Group A Strep Ag Negative Negative Diagnoses and all orders for this visit: 1. Controlled type 2 diabetes mellitus with microalbuminuria, with long-term current use of insulin ([address] Not optimally controlled patient has increased her Lantus to 50 units at night and no symptoms of hypoglycemia she will follow-up with Dr. Olvin Dallas in December -     METABOLIC PANEL, COMPREHENSIVE 
-     MICROALBUMIN, UR, RAND W/ MICROALB/CREAT RATIO 2. Mixed hyperlipidemia Reviewed Dr. Yamilex Brannon labs and it appears she has not had a cholesterol since January. She has not had her liver checked since January as well so we will do today. -     LIPID PANEL 3. Acquired hypothyroidism Continue thyroid medicine and adjust as needed 
-     TSH 3RD GENERATION 4. Essential hypertension Continue medicines Discussed use of Afrin, children's pseudoephedrine and Advil for her URI symptoms. She can use these products for 2-3 days then discontinue -     LIPID PANEL 
-     METABOLIC PANEL, COMPREHENSIVE 
-     MICROALBUMIN, UR, RAND W/ MICROALB/CREAT RATIO 5. Viral URI Patient has been on a Z-Juan A and then Doxy. She is likely recovering. She does not need further antibiotics. Recommended over-the-counter agents with monitoring of her blood pressure. Follow-up in 3 months or earlier if needed. This note will not be viewable in [address]

## 2018-11-08 LAB
ALBUMIN SERPL-MCNC: 4.5 G/DL (ref 3.5–4.8)
ALBUMIN/CREAT UR: 468 MG/G CREAT (ref 0–30)
ALBUMIN/GLOB SERPL: 2 {RATIO} (ref 1.2–2.2)
ALP SERPL-CCNC: 95 IU/L (ref 39–117)
ALT SERPL-CCNC: 36 IU/L (ref 0–32)
AST SERPL-CCNC: 32 IU/L (ref 0–40)
BILIRUB SERPL-MCNC: 0.5 MG/DL (ref 0–1.2)
BUN SERPL-MCNC: 27 MG/DL (ref 8–27)
BUN/CREAT SERPL: 28 (ref 12–28)
CALCIUM SERPL-MCNC: 9.6 MG/DL (ref 8.7–10.3)
CHLORIDE SERPL-SCNC: 100 MMOL/L (ref 96–106)
CHOLEST SERPL-MCNC: 165 MG/DL (ref 100–199)
CO2 SERPL-SCNC: 23 MMOL/L (ref 20–29)
CREAT SERPL-MCNC: 0.96 MG/DL (ref 0.57–1)
CREAT UR-MCNC: 83.7 MG/DL
GLOBULIN SER CALC-MCNC: 2.3 G/DL (ref 1.5–4.5)
GLUCOSE SERPL-MCNC: 181 MG/DL (ref 65–99)
HDLC SERPL-MCNC: 59 MG/DL
INTERPRETATION, 910389: NORMAL
INTERPRETATION: NORMAL
LDLC SERPL CALC-MCNC: 65 MG/DL (ref 0–99)
Lab: NORMAL
MICROALBUMIN UR-MCNC: 391.7 UG/ML
PDF IMAGE, 910387: NORMAL
POTASSIUM SERPL-SCNC: 4.3 MMOL/L (ref 3.5–5.2)
PROT SERPL-MCNC: 6.8 G/DL (ref 6–8.5)
SODIUM SERPL-SCNC: 138 MMOL/L (ref 134–144)
TRIGL SERPL-MCNC: 203 MG/DL (ref 0–149)
TSH SERPL DL<=0.005 MIU/L-ACNC: 5.24 UIU/ML (ref 0.45–4.5)
VLDLC SERPL CALC-MCNC: 41 MG/DL (ref 5–40)

## 2018-11-10 RX ORDER — LEVOTHYROXINE SODIUM 50 UG/1
TABLET ORAL
Qty: 90 TAB | Refills: 0 | Status: SHIPPED | OUTPATIENT
Start: 2018-11-10 | End: 2019-01-26 | Stop reason: SDUPTHER

## 2018-11-14 DIAGNOSIS — I10 ESSENTIAL HYPERTENSION: ICD-10-CM

## 2018-11-14 RX ORDER — METOPROLOL TARTRATE 50 MG/1
25 TABLET ORAL 2 TIMES DAILY
Qty: 90 TAB | Refills: 2 | Status: SHIPPED | OUTPATIENT
Start: 2018-11-14 | End: 2019-08-30

## 2018-12-08 DIAGNOSIS — E11.21 TYPE II DIABETES MELLITUS WITH NEPHROPATHY (HCC): ICD-10-CM

## 2018-12-08 RX ORDER — LOSARTAN POTASSIUM 25 MG/1
TABLET ORAL
Qty: 90 TAB | Refills: 0 | Status: SHIPPED | OUTPATIENT
Start: 2018-12-08 | End: 2019-03-08 | Stop reason: SDUPTHER

## 2019-01-02 ENCOUNTER — OFFICE VISIT (OUTPATIENT)
Dept: SLEEP MEDICINE | Age: 74
End: 2019-01-02

## 2019-01-02 VITALS
BODY MASS INDEX: 37.73 KG/M2 | HEIGHT: 62 IN | HEART RATE: 65 BPM | DIASTOLIC BLOOD PRESSURE: 75 MMHG | SYSTOLIC BLOOD PRESSURE: 123 MMHG | WEIGHT: 205 LBS | OXYGEN SATURATION: 94 %

## 2019-01-02 DIAGNOSIS — G47.33 OBSTRUCTIVE SLEEP APNEA (ADULT) (PEDIATRIC): Primary | ICD-10-CM

## 2019-01-02 DIAGNOSIS — I10 ESSENTIAL HYPERTENSION WITH GOAL BLOOD PRESSURE LESS THAN 130/80: ICD-10-CM

## 2019-01-02 NOTE — PATIENT INSTRUCTIONS
[address] [facility] [place]. [place], [address]  Tel.  [phone]  Fax. [address] [facility] [address]  [place], [address]  Tel.  [phone]  Fax. [phone] [address]  Tel.  [phone]  Fax. [phone]     PROPER SLEEP HYGIENE    What to avoid  · Do not have drinks with caffeine, such as coffee or black tea, for 8 hours before bed. · Do not smoke or use other types of tobacco near bedtime. Nicotine is a stimulant and can keep you awake. · Avoid drinking alcohol late in the evening, because it can cause you to wake in the middle of the night. · Do not eat a big meal close to bedtime. If you are hungry, eat a light snack. · Do not drink a lot of water close to bedtime, because the need to urinate may wake you up during the night. · Do not read or watch TV in bed. Use the bed only for sleeping and sexual activity. What to try  · Go to bed at the same time every night, and wake up at the same time every morning. Do not take naps during the day. · Keep your bedroom quiet, dark, and cool. · Get regular exercise, but not within 3 to 4 hours of your bedtime. .  · Sleep on a comfortable pillow and mattress. · If watching the clock makes you anxious, turn it facing away from you so you cannot see the time. · If you worry when you lie down, start a worry book. Well before bedtime, write down your worries, and then set the book and your concerns aside. · Try meditation or other relaxation techniques before you go to bed. · If you cannot fall asleep, get up and go to another room until you feel sleepy. Do something relaxing. Repeat your bedtime routine before you go to bed again. · Make your house quiet and calm about an hour before bedtime. Turn down the lights, turn off the TV, log off the computer, and turn down the volume on music. This can help you relax after a busy day.     Drowsy Driving  The [address] [place] [address] Road Traffic Safety Administration cites drowsiness as a causing factor in more than 604,715 police reported crashes annually, resulting in 76,000 injuries and 1,500 deaths. Other surveys suggest 55% of people polled have driven while drowsy in the past year, 23% had fallen asleep but not crashed, 3% crashed, and 2% had and accident due to drowsy driving. Who is at risk? Young Drivers: One study of drowsy driving accidents states that 55% of the drivers were under 25 years. Of those, 75% were male. Shift Workers and Travelers: People who work overnight or travel across time zones frequently are at higher risk of experiencing Circadian Rhythm Disorders. They are trying to work and function when their body is programed to sleep. Sleep Deprived: Lack of sleep has a serious impact on your ability to pay attention or focus on a task. Consistently getting less than the average of 8 hours your body needs creates partial or cumulative sleep deprivation. Untreated Sleep Disorders: Sleep Apnea, Narcolepsy, R.L.S., and other sleep disorders (untreated) prevent a person from getting enough restful sleep. This leads to excessive daytime sleepiness and increases the risk for drowsy driving accidents by up to 7 times. Medications / Alcohol: Even over the counter medications can cause drowsiness. Medications that impair a drivers attention should have a warning label. Alcohol naturally makes you sleepy and on its own can cause accidents. Combined with excessive drowsiness its effects are amplified. Signs of Drowsy Driving:   * You don't remember driving the last few miles   * You may drift out of your jerry   * You are unable to focus and your thoughts wander   * You may yawn more often than normal   * You have difficulty keeping your eyes open / nodding off   * Missing traffic signs, speeding, or tailgating  Prevention-   Good sleep hygiene, lifestyle and behavioral choices have the most impact on drowsy driving.  There is no substitute for sleep and the average person requires 8 hours nightly. If you find yourself driving drowsy, stop and sleep. Consider the sleep hygiene tips provided during your visit as well. Medication Refill Policy: Refills for all medications require 1 week advance notice. Please have your pharmacy fax a refill request. We are unable to fax, or call in \"controled substance\" medications and you will need to pick these prescriptions up from our office. [employer] Activation    Thank you for requesting access to [employer]. Please follow the instructions below to securely access and download your online medical record. [employer] allows you to send messages to your doctor, view your test results, renew your prescriptions, schedule appointments, and more. How Do I Sign Up? 1. In your internet browser, go to [email or web address]. 2. Click on the First Time User? Click Here link in the Sign In box. You will see the New Member Sign Up page. 3. Enter your [employer] Access Code exactly as it appears below. You will not need to use this code after youve completed the sign-up process. If you do not sign up before the expiration date, you must request a new code. [employer] Access Code: [email or web address]  Expires: 2019  7:56 AM (This is the date your [employer] access code will )    4. Enter the last four digits of your Social Security Number (xxxx) and Date of Birth (mm/dd/yyyy) as indicated and click Submit. You will be taken to the next sign-up page. 5. Create a [employer] ID. This will be your [employer] login ID and cannot be changed, so think of one that is secure and easy to remember. 6. Create a [employer] password. You can change your password at any time. 7. Enter your Password Reset Question and Answer. This can be used at a later time if you forget your password. 8. Enter your e-mail address. You will receive e-mail notification when new information is available in [address] 9. Click Sign Up.  You can now view and download portions of your medical record. 10. Click the Download Summary menu link to download a portable copy of your medical information. Additional Information    If you have questions, please call [phone]. Remember, [employer] is NOT to be used for urgent needs. For medical emergencies, dial 911.

## 2019-01-02 NOTE — PROGRESS NOTES
[address] [facility] [place]. [place], [address]  Tel.  [phone]  Fax. 100 [facility] [address]  [place], [address]  Tel.  [phone]  Fax. [phone] [address] [facility] Kacie Campos   Tel.  [phone]  Fax. [phone]     S>Quentin Camarillo is a 68 y.o. female seen for a positive airway pressure follow-up. She reports minimal problems using the device. The following problems are identified:    Drowsiness no Problems exhaling no   Snoring no Forget to put on Yes, she was sick and had multiple URI's   Mask Comfortable yes Can't fall asleep no   Dry Mouth no Mask falls off yes   Air Leaking no Frequent awakenings no     She did not bring her machine on her trip    She admits that her sleep has improved. Therapy Apnea Index averaged over PAP use: 2 /hr which reflects improved sleep breathing condition. Allergies   Allergen Reactions    Adhesive Rash and Itching    Advil [Ibuprofen] Swelling     FEET SWELL    Ceftin [Cefuroxime Axetil] Itching    Lotensin [Benazepril] Cough    Penicillins Swelling    Sulfa (Sulfonamide Antibiotics) Rash, Itching and Swelling       She has a current medication list which includes the following prescription(s): losartan, metoprolol tartrate, levothyroxine, losartan-hydrochlorothiazide, simvastatin, chlorpheniramine-dm, pseudoephedrine, accu-chek compact plus test, magnesium oxide, sertraline, blood sugar diagnostic, drum, metformin er, insulin glargine, humalog kwikpen insulin, cholecalciferol, aspirin, buffered, and omega-3 acid ethyl esters. .      She  has a past medical history of Arthritis, CAD (coronary artery disease), Deviated septum, Diabetes ([address] Heart disease, Hypertension, Incontinence, Nasal polyp, and Unspecified sleep apnea.     [place] Sleepiness Score: 6   and Modified F.O.S.Q. Score Total / 2: 18.5      O>    Visit Vitals  /75 (BP 1 Location: Left arm, BP Patient Position: Sitting)   Pulse 65   Ht 5' 2\" (1.575 m)   Wt 205 lb (93 kg)   SpO2 94%   BMI 37.49 kg/m²           General:   Alert, oriented, not in distress   Neck:   No JVD    Chest/Lungs:  symetrical lung expansion , no accessory muscle use    Extremities:  no obvious rashes , negative edema    Neuro:  No focal deficits ; No obvious tremor    Psych:  Normal affect ,  Normal countenance ;           A>    ICD-10-CM ICD-9-CM    1. Obstructive sleep apnea (adult) (pediatric) G47.33 327.23 AMB SUPPLY ORDER   2. Essential hypertension with goal blood pressure less than 130/80 I10 401.9      AHI = 19(3-16). On CPAP :  5-10 cmH2O. Compliant:      no    Therapeutic Response:  Positive    P>      * Follow-up Disposition: Not on File  she will continue on her current pressure settings. I have reviewed medicare requirements regarding PAP usage  Download in 30 days then send order. * She was asked to contact our office for any problems regarding PAP therapy. *Counseling was provided regarding the importance of regular PAP use and on proper sleep hygiene and safe driving. * Re-enforced proper and regular cleaning for the device. 2. Hypertension - she continues on her current regimen. I have reviewed the relationship between hypertension as it relates to sleep-disordered breathing.      Electronically signed by    Lucero Aguilar MD  Diplomate in Sleep Medicine  [facility]

## 2019-01-07 ENCOUNTER — DOCUMENTATION ONLY (OUTPATIENT)
Dept: SLEEP MEDICINE | Age: 74
End: 2019-01-07

## 2019-01-20 RX ORDER — SIMVASTATIN 20 MG/1
TABLET, FILM COATED ORAL
Qty: 90 TAB | Refills: 0 | Status: SHIPPED | OUTPATIENT
Start: 2019-01-20 | End: 2019-04-18 | Stop reason: SDUPTHER

## 2019-01-29 LAB — HBA1C MFR BLD HPLC: 8.1 %

## 2019-02-01 DIAGNOSIS — I10 ESSENTIAL HYPERTENSION: ICD-10-CM

## 2019-02-01 RX ORDER — LOSARTAN POTASSIUM AND HYDROCHLOROTHIAZIDE 12.5; 5 MG/1; MG/1
TABLET ORAL
Qty: 180 TAB | Refills: 0 | Status: SHIPPED | OUTPATIENT
Start: 2019-02-01 | End: 2019-04-28 | Stop reason: SDUPTHER

## 2019-03-14 RX ORDER — LANOLIN ALCOHOL/MO/W.PET/CERES
400 CREAM (GRAM) TOPICAL 2 TIMES DAILY
Qty: 240 TAB | Refills: 1 | Status: SHIPPED | OUTPATIENT
Start: 2019-03-14 | End: 2019-08-16 | Stop reason: SDUPTHER

## 2019-03-15 ENCOUNTER — TELEPHONE (OUTPATIENT)
Dept: INTERNAL MEDICINE CLINIC | Age: 74
End: 2019-03-15

## 2019-03-15 NOTE — TELEPHONE ENCOUNTER
Please refer her to Dr. Lisa Julien with [address]  [facility] neurology for memory.   I will sign off

## 2019-03-15 NOTE — TELEPHONE ENCOUNTER
----- Message from Lizbeth Calderón sent at 3/15/2019 11:08 AM EDT -----  Regarding: Dr. Rita Garcia  Patient is having issues with memory loss and would like you to recommend a doctor for her to see. Her number is [phone].

## 2019-03-18 DIAGNOSIS — R41.3 MEMORY LOSS OF UNKNOWN CAUSE: Primary | ICD-10-CM

## 2019-03-18 NOTE — TELEPHONE ENCOUNTER
Spoke with patient & provided her with Dr. Elisa Elena name & office phone number as recommended by patient's PCP. Patient verbalized appreciation.

## 2019-04-19 RX ORDER — SIMVASTATIN 20 MG/1
TABLET, FILM COATED ORAL
Qty: 90 TAB | Refills: 0 | Status: SHIPPED | OUTPATIENT
Start: 2019-04-19 | End: 2019-07-19 | Stop reason: SDUPTHER

## 2019-04-25 LAB — HBA1C MFR BLD HPLC: 8.7 %

## 2019-05-31 ENCOUNTER — TELEPHONE (OUTPATIENT)
Dept: NEUROLOGY | Age: 74
End: 2019-05-31

## 2019-05-31 NOTE — TELEPHONE ENCOUNTER
----- Message from [facility] sent at 5/31/2019 11:10 AM EDT -----  Regarding: Dr. Brian Moreno  Pt [phone] needs a NP appt for memory issues

## 2019-06-06 ENCOUNTER — OFFICE VISIT (OUTPATIENT)
Dept: NEUROLOGY | Age: 74
End: 2019-06-06

## 2019-06-06 DIAGNOSIS — F03.90 DEMENTIA WITHOUT BEHAVIORAL DISTURBANCE, UNSPECIFIED DEMENTIA TYPE: ICD-10-CM

## 2019-06-06 DIAGNOSIS — F03.90 MAJOR NEUROCOGNITIVE DISORDER (HCC): Primary | ICD-10-CM

## 2019-06-06 NOTE — PROGRESS NOTES
This note will not be viewable in [address] [facility] at [place] [address], [address], [address]    Office:  [phone]  Fax: [phone]                 Initial Office Exam  Patient Name: Kimani Monreal  Age: 68 y.o. Gender: female   Handedness: right handed   Presenting Concern: Declining cognition  Primary Care Physician: Toby Goins MD  Referring Provider No ref. provider found      REASON FOR REFERRAL:  This comprehensive and medically necessary neuropsychological assessment was requested to assist a differential diagnosis of memory difficulties. The use and purpose of this examination, as well as the extent and limitations of confidentiality, were explained prior to obtaining permission to participate. Instructions were provided regarding the necessity to put forth optimal effort and answer questions truthfully in order to obtain reliable and accurate test results. PERTINENT HISTORY:  Ms. Indio Azul presented for a neuropsychological assessment at the recommendation of his treating physician secondary to complaints of declining memory and poor concentration. She has also reported symptoms that include feelings of worthlessness and increased anxiety. Ms. Toni Wagner  began noticing symptoms of memory loss as far back as 5 years ago, but more recently in the last year things have seemed worse. From a brief review of her medical and personal history there has not been any other significant neurological injury or illness noted or reported. She did report experiencing anxiety she was a child. Ms. Indio Azul does not  report any problems at birth or difficulties meeting developmental milestones. She reports that she had an adequate level of family support and was not subject to trauma or abuse as a child. Ms. Liban Vizcaino does not  report being retain in school or receiving special assistance in any of She classes or subjects. Ms. Liban Vizcaino completing 16 years of education. Ms. Liban Vizcaino does not  exercise on a regular basis and does  maintain a balanced diet. She does  report problems with sleep and does not  complain of pain. She does  participate in mentally stimulating activities. Ms. Liban Vizcaino does not  have concerns regarding prescription medications, family members, place of residence, or financial stressors. Ms. Liban Vizcaino indicated that she is independent in her instrumental activities of daily living, including shopping, meal preparation, housekeeping, doing laundry, driving a car, managing medications, and finances. Current Outpatient Medications   Medication Sig    levothyroxine (SYNTHROID) 50 mcg tablet TAKE 1 TABLET BY MOUTH EVERY DAY IN THE MORNING BEFORE BREAKFAST. PLEASE RECHECK TSH    losartan-hydroCHLOROthiazide (HYZAAR) 50-12.5 mg per tablet Take 1 Tab by mouth two (2) times a day. Please make follow up lab and med check    simvastatin (ZOCOR) 20 mg tablet TAKE 1 TABLET BY MOUTH EVERY DAY IN THE EVENING    magnesium oxide (MAG-OX) 400 mg tablet TAKE 1 TAB BY MOUTH TWO (2) TIMES A DAY.  losartan (COZAAR) 25 mg tablet Take 2 Tabs by mouth daily. If lightheaded or dizzyness decrease to 1 tab po daily    metoprolol tartrate (LOPRESSOR) 50 mg tablet Take 0.5 Tabs by mouth two (2) times a day.  chlorpheniramine-dm (CORICIDIN HBP COUGH AND COLD) 4-30 mg tab Take as directed    pseudoephedrine (SUDAFED) 30 mg tablet Take 1 Tab by mouth every six (6) hours as needed for Congestion.  ACCU-CHEK COMPACT PLUS TEST strp USE TO TEST BLOOD SUGAR 4 TIMES DAILY. DX E11.9    magnesium oxide (MAG-OX) 400 mg tablet TAKE 1 TAB BY MOUTH TWO (2) TIMES A DAY.     sertraline (ZOLOFT) 100 mg tablet TAKE 1 TABLET BY MOUTH EVERY DAY IN THE EVENING    Blood Sugar Diagnostic, Drum (ACCU-CHEK COMPACT PLUS TEST) strp USE TO TEST BLOOD SUGAR 4 TIMES DAILY. DX E11.9    metFORMIN ER (GLUCOPHAGE XR) 500 mg tablet TAKE 4 TABLETS BY MOUTH EVERY DAY    insulin glargine (LANTUS) 100 unit/mL injection 20 Units by SubCUTAneous route two (2) times a day. (Patient taking differently: 46 Units by SubCUTAneous route two (2) times a day. 46 in the evening)    HUMALOG KWIKPEN 100 unit/mL kwikpen     Cholecalciferol, Vitamin D3, (VITAMIN D3) 1,000 unit cap Take 1 Cap by mouth nightly.  Aspirin, Buffered 81 mg tab Take  by mouth daily.  omega-3 acid ethyl esters (LOVAZA) 1 gram capsule Take 2 g by mouth two (2) times a day. No current facility-administered medications for this visit. Past Medical History:   Diagnosis Date    Arthritis     CAD (coronary artery disease)     STENT PLACED>PLAQUE BROKE OFF--HAD \"MINOR MI\"    Deviated septum 13    HAS TROUBLE BREATHING THROUGH LEFT SIDE; CT SHOWED POLYP    Diabetes ([address]     Heart disease     Hypertension     Incontinence     Nasal polyp     Unspecified sleep apnea 13    CAN'T TOLERATE CPAP       No flowsheet data found. No data recorded    Past Surgical History:   Procedure Laterality Date    CARDIAC SURG PROCEDURE UNLIST      CARDIAC CATH;ANGIOPLASTY WITH STENT    HX  SECTION      X3    HX COLONOSCOPY      HX HEENT  ?     SEPTOPLASTY       Social History     Socioeconomic History    Marital status:      Spouse name: Not on file    Number of children: Not on file    Years of education: Not on file    Highest education level: Not on file   Tobacco Use    Smoking status: Former Smoker     Packs/day: 1.00     Years: 5.00     Pack years: 5.00     Last attempt to quit: 1970     Years since quittin.4    Smokeless tobacco: Never Used   Substance and Sexual Activity    Alcohol use: Yes     Comment: \"VERY LITTLE ALCOHOL\"    Drug use: No   Social History Narrative            3 children women 36, 40, 37 healthy other than skin issues       Family History   Problem Relation Age of Onset    Heart Disease Mother     Heart Surgery Mother         VALVE REPLACED AT AGE 72    Heart Attack Father 46        MASSIVE     Anesth Problems Neg Hx        CT Results (most recent):  Results from Hospital Encounter encounter on 10/30/13   CT SINUSES WITHOUT [facility]    Narrative **Final Report**       ICD Codes / Adm. Diagnosis: 473.9  470 / Unspecified sinusitis (chronic    Deviated nasal septum  Examination:  CT SINUS [facility] WO CON  - [id number] - Oct 30 2013  9:02AM  Accession No:  [id number]  Reason:  Unspecified sinusitis (chronic)      REPORT:  EXAM:  CT SINUS WITHOUT CONTRAST    INDICATION: Unspecified sinusitis and deviated nasal septum. COMPARISON: None. CONTRAST: None. TECHNIQUE:  Multislice helical CT of the paranasal sinuses was performed in the axial   plane and sagittal and coronal reformations were generated. The study was   performed according to the [place] protocol. FINDINGS:  The left frontal sinus is hypoplastic. The right frontal sinus and   frontoethmoidal recess are clear. There is opacification of the left anterior ethmoid air cells. The maxillary sinuses are clear. The maxillary sinus infundibula and   ostiomeatal units are patent. The left posterior ethmoid air cells are opacified. The right posterior   ethmoid air cells are clear. The sphenoid sinus is not pneumatized. There is a soft tissue mass in the superior left nasal cavity. The nasal   septum is midline. There is no bone destruction or bone dehiscence of the paranasal sinuses. IMPRESSION:  1. Soft tissue mass in the left superior nasal cavity with opacification of   the left ethmoid air cells. 2. Hypoplastic left frontal and sphenoid sinuses.               Signing/Reading Doctor: Cezar Santiago ([id number])    Approved: Cezar Santiago ([id number])  Oct 30 2013  9:29AM                                 MRI Results (most recent):  No results found for this or any previous visit. MENTAL STATUS:    Orientation:  Fully oriented   Eye Contact:  Adequately maintained   Motor Behavior:   Ambulated independently, stable gait   Speech:   Fluent, no evidence of aphasia or dysarthria   Thought Process: Within normal limits   Thought Content:  No evidence of hallucinations or delusions   Suicidal ideations:  Denied ideations   Mood:   Mildly anxious and depressed   Affect:   Congruent with stated mood   Concentration:   Within normal limits   Abstraction:   Within normal limits   Insight:   Adequate     On the Modified Mini-Mental Status Exam: =80/100 (1 %/ile)      DIAGNOSTIC IMPRESSIONS:    ICD-10-CM ICD-9-CM    1. Major neurocognitive disorder F01.50 294.20    2. Dementia without behavioral disturbance, unspecified dementia type F03.90 294.20              PLAN:  1. Complete a comprehensive neuropsychological assessment to provide a differential diagnosis of presenting concerns as well as to assist with disposition and treatment planning as appropriate. 2. Consider compensatory and remedial cognitive training. 3. Consider nonpharmacological interventions for anxiety. 4. Consider an adaptive driving evaluation. 96559 x 1 Review of records. Face to face interview w/ patient. Determine test protocol: 60 minutes. Total 1 unit        Maria R Hernandez, PhD, ABPP, LCP  Licensed Clinical Psychologist/ Neuropsychologist        This note will not be viewable in [address]

## 2019-06-23 RX ORDER — SERTRALINE HYDROCHLORIDE 100 MG/1
TABLET, FILM COATED ORAL
Qty: 90 TAB | Refills: 3 | Status: SHIPPED | OUTPATIENT
Start: 2019-06-23 | End: 2020-06-24

## 2019-06-30 DIAGNOSIS — E11.21 TYPE II DIABETES MELLITUS WITH NEPHROPATHY (HCC): ICD-10-CM

## 2019-07-01 RX ORDER — LOSARTAN POTASSIUM 25 MG/1
TABLET ORAL
Qty: 180 TAB | Refills: 0 | Status: SHIPPED | OUTPATIENT
Start: 2019-07-01 | End: 2019-08-25 | Stop reason: ALTCHOICE

## 2019-07-09 ENCOUNTER — TELEPHONE (OUTPATIENT)
Dept: INTERNAL MEDICINE CLINIC | Age: 74
End: 2019-07-09

## 2019-07-09 NOTE — TELEPHONE ENCOUNTER
Returned call to pt, she states she is supposed to be on Losartan 2 tabs daily but pharmacy filled rx for 1 time daily. I advised her that Dr. Adrienne Webb send it to the pharmacy as BID.  Pt voices understanding that the medication sig has not changed

## 2019-07-09 NOTE — TELEPHONE ENCOUNTER
Pt states she received a refill on Losartan but the directions have changed from 1 tab by mouth daily to 2 tabs by mouth daily. She wants to know why did it change. Thanks.

## 2019-07-11 ENCOUNTER — OFFICE VISIT (OUTPATIENT)
Dept: NEUROLOGY | Age: 74
End: 2019-07-11

## 2019-07-11 DIAGNOSIS — G31.84 AMNESTIC MCI (MILD COGNITIVE IMPAIRMENT WITH MEMORY LOSS): Primary | ICD-10-CM

## 2019-07-11 NOTE — PROGRESS NOTES
This note will not be viewable in [address] [facility] at [address]    Office:  [phone]  Fax: [phone]                                             Neuropsychological Evaluation Report    Patient Name: Gustavus Mcardle  Age: 68 y.o. Gender: female   Handedness: right handed   Presenting Concern: Declining cognition  Primary Care Physician: Velia Maciel MD  Referring Provider: Same as above       PATIENT HISTORY (OBTAINED DURING INITIAL CLINICAL EVALUATION):    REASON FOR REFERRAL:  This comprehensive and medically necessary neuropsychological assessment was requested to assist a differential diagnosis of memory difficulties. The use and purpose of this examination, as well as the extent and limitations of confidentiality, were explained prior to obtaining permission to participate. Instructions were provided regarding the necessity to put forth optimal effort and answer questions truthfully in order to obtain reliable and accurate test results.     PERTINENT HISTORY:  Ms. Cecilia Moore presented for a neuropsychological assessment at the recommendation of his treating physician secondary to complaints of declining memory and poor concentration. She has also reported symptoms that include feelings of worthlessness and increased anxiety. Ms. Rojas Polanco  began noticing symptoms of memory loss as far back as 5 years ago, but more recently in the last year things have seemed worse. From a brief review of her medical and personal history there has not been any other significant neurological injury or illness noted or reported. She did report experiencing anxiety she was a child.       Ms. Cecilia Moore does not  report any problems at birth or difficulties meeting developmental milestones.  She reports that she had an adequate level of family support and was not subject to trauma or abuse as a child.  Ms. Tiff Ponce does not  report being retain in school or receiving special assistance in any of She classes or subjects. Ms. Tiff Ponec completing 16 years of education.     Ms. Tiff Ponce does not  exercise on a regular basis and does  maintain a balanced diet. She does  report problems with sleep and does not  complain of pain. She does  participate in mentally stimulating activities. Ms. Tiff Ponce does not  have concerns regarding prescription medications, family members, place of residence, or financial stressors. Ms. Tiff Ponce indicated that she is independent in her instrumental activities of daily living, including shopping, meal preparation, housekeeping, doing laundry, driving a car, managing medications, and finances.       METHODS OF ASSESSMENT (Current Evaluation):  Clinician Administered:  Clinical Interview  Review of Medical Records  Clock Drawing Task  Modified Mini-Mental Status Exam (3MS)  Personality Assessment [place] and Depression Scale  Revised Memory and Behavior Checklist    Technician Administered:  D-KEFS:   Design Fluency   [place] Test  NAB Judgment  RBANS-A  Reliable Digit Span  [place] Functional Living Scale  [place] Making Test  [place] Card Sorting Test  Word Choice Effort Test (ACS)    TEST OBSERVATIONS:  Ms. Tiff Ponce arrived promptly for the testing session. Dress and grooming were appropriate; physical presentation was unchanged from that observed during the clinical interview. Speech was fluent, intelligible, and goal-directed. Affect was congruent with the euthymic mood conveyed. Ms. Tiff Ponce was adequately cooperative and appeared to put forth good effort throughout this examination. Report with the examiner was adequately established and maintained. Minimal prompting was required. Comprehension of test instructions was not problematic.   Performance motivation was objectively measured by three instruments (RBANS ES, Reliable Digit Span, WC Effort), and Ms. Tiff Ponce produced normal scores on these measures. Accordingly, test findings below do not appear to be the product of disingenuous effort. Given the above observations, plus comments contained in the Mental Status section, the results of this examination are regarded as reasonably reliable and valid. TEST RESULTS:  Quantitative test results are derived from comparisons to age and education corrected cohort normative data, where applicable. Percentiles are included in these instances. Qualitative test results are determined using clinical observations.              General Orientation and Awareness:       Orientation to person yes   Time yes  Place yes  Circumstance yes               Awareness of deficits: Adequate                  Cognitive performance validity testing: Valid        Attention/Concentration:                Classification:     Coding (18 percentile)                  Low Average  Simple visuomotor tracking (14 percentile)               Low Average  Digits forward (18 percentile)                 Low Average  Digits backward (31 percentile)                 Average    Visuospatial and Constructional Praxis:     Figure copy (6 percentile)                                                   Mildly Impaired  Line orientation (31 percentile)                                                  Average     Language:         Picture naming (71 percentile)                               Average  Semantic fluency (44 percentile)                    Average   Controlled oral word association (FAS) (16 percentile)   Low Average    Memory and Learning:       Immediate word list learning (18 percentile)               Low Average  Delayed word list recall (2 percentile)                  Moderately Impaired  Delayed word list recognition (0.4 percentile)               Severely Impaired  Immediate story memory (12 percentile)                  Low Average  Delayed story recall (< 0.1 percentile)                Severely Impaired  Delayed figure recall (0.2 percentile)                Severely Impaired    Cognitive Tests of Executive Functioning:     Ability to think flexibly, Trail Making B (21 percentile)              Low Average  Simple judgment in daily decision making (27 percentile)             Average  Design Fluency (64 percentile)      Average  [place] Test (5 percentile)       Mildly Impaired  WCST    Total Errors (13 percentile)      Low Average   Perseverative Responses (21 percentile)    Low Average   Categories Completed (10 percentile)    Low Average    Adaptive Behavioral Measure of Daily Functioning:   Time:     75 %ile   Memory/calculations:   75 % ile  Communication:    75 %ile   Memory:     25%ile    Total:     T= 51 (54%ile)      Intellectual and Basic Cognitive Functioning (WAIS-IV):  ACS Test of pre-morbid functioning reading recognition lower limit estimated WAIS-IV FSIQ score:       Estimated full scale IQ:            48 percentile     Average    Emotional Functioning:  Ms. Peyton Kawasaki administered a personality assessment inventory to assess her current level of emotional functioning. Examination of her validity scales indicated that she appropriately attended to the item content and that her response style did not distort her overall profile. Ms. Gallagher's clinical profile revealed no marked elevations that would be considered likely clinically significant. There are concerns regarding her physical functioning and health including vague physical symptoms and complaints of fatigue. These physical symptoms are often accompanied by depression and anxiety. She is reporting that she experiences a great deal of tension and has some difficulties relaxing. Ms. Peyton Kawasaki is self-concept appears to involve a rather negative self-evaluation is likely to be self-critical to setbacks very well may blame herself for past failures and lost opportunities.   She may tend to play down her successes and view her accomplishments as heavily dependent on the efforts of and goodwill of others. IMPRESSIONS:  Kelsey Martinez was seen for a neuropsychological evaluation to assist in the diagnosis of her memory problems. Was administered a battery of measures assessing her performance on measures of attention, visual-spatial, language, memory, and executive functioning. Her overall level of cognitive functioning was determined to be in the low average range. Her performance on measures of attention, language, and executive functioning, were within the average to low average range. Normal variability was noted on measures of these three cognitive domains. Ms. Kelsey Martinez was in the average range on her perception of Line orientation, but demonstrated mild dyspraxia her delete to copy a complex figure. Overall performance on the visual-spatial domain was in the mildly impaired range. Although Mrs. Kelsey Martinez performed in the severely impaired range on memory measures, her learning or verbal acquisition was in the low average range. In contrast her laid recall on both verbal and nonverbal measures this significantly impaired. On a behavioral measure of daily living skills she performed in the average range overall. In summary, findings are suggestive of an individual experiencing some cognitive decline, primarily in the area of her delayed memory. These findings are not consistent with a dementia at this time but do represent a decline from her estimated premorbid levels of functioning. Some of her memory difficulties may be due to poor quality of sleep, increased levels of anxiety and/or depression, or possibly represent the early stage of an as yet undefine neurocognitive decline (e.g. White matter disease). Imaging studies might be considered and useful in narrowing the differential diagnosis. DIAGNOSTIC IMPRESSIONS:    ICD-10-CM ICD-9-CM    1.  [place] MCI (mild cognitive impairment with memory loss) G31.84 331.83 [place] NEUROPSYCHOLOGICAL TST EVAL PHYS/QHP 1ST HOUR     780.93 PA PSYL/NRPSYCL TST PHYS/QHP 2+ TST 1ST 30 MIN      PA PSYCL/NRPSYCL TST PHYS/QHP 2+ TST EA ADDL 30 MIN      PA PSYCL/NRPSYCL TST TECH 2+ TST 1ST 30 MIN      PA PSYCL/NRPSYCL TST TECH 2+ TST EA ADDL 30 MIN      PA PSYCL/NRPSYCL TST TECH 2+ TST EA ADDL 30 MIN      PA PSYCL/NRPSYCL TST TECH 2+ TST EA ADDL 30 MIN      PA PSYCL/NRPSYCL TST TECH 2+ TST EA ADDL 30 MIN      PA PSYCL/NRPSYCL TST TECH 2+ TST EA ADDL 30 MIN         RECOMMENDATIONS:   1. Findings should be reviewed with Ms. Jayce Lester to insure her understanding and discuss the potential implications. 2. Consider possible referral for neuroradiological studies of the brain. 2. Emphasis should be placed on Ms. Jayce Lester obtaining good sleep hygiene and maintaining adequate physical exercise to promote good brain health. 3. Ms. Jayce Lester may benefit from a referral to psychotherapy to address anxiety and work on adequate stress management skills. 4. Ms. Jayce Lester is encouraged to seek out various forms of mental stimulation that would help to \"exercise\" her brain. This might include learning a new skill, hobby, travel, attending lectures, or evening reading or listening to podcasts or videos on topics of her interest.      Thank you for allowing me the opportunity to assist you in Ms. Gallagher's care. Please do not hesitate to contact me should you have additional questions that I may not have addressed. 76353 x 1  96138 x 1  96139 x 6  96132 x 1  96133 x 2          Mejia Rebollar, Ph.D., ABPP  Licensed Clinical Psychologist  Neuropsychologist  Board Certified Rehabilitation Psychologist      Time Documentation:     20995 x 1: Neurobehavioral Status Exam/Clinical Interview: (1 hour, (already billed on first date of service)     45927*3 Neuropsych testing/data gathering by Neuropsychologist (35 additional minutes, see above)      96138 x 1  96139 x 6 Test Administration/Data Gathering By Technician: (3.5 hours).  74598 x 1 (first 30 minutes), 12035 x 7 (each additional 30 minutes)     96132 x 1  96133 x 1 Testing Evaluation Services by Neuropsychologist (1 hour, 50 minutes) 96132 x 1 (first hour), 96133 x 1 (50 minutes)     Definitions:       69924/12705:  Neurobehavioral Status Exam, Clinical interview. Clinical assessment of thinking, reasoning and judgment, by neuropsychologist, both face to face time with patient and time interpreting those test results and reporting, first and subsequent hours)     40032/46790: Neuropsychological Test Administration by Technician/Psychometrist, first 30 minutes and each additional 30 minutes. The above includes: Record review. Review of history provided by patient. Review of collaborative information. Testing by Clinician. Review of raw data. Scoring. Report writing of individual tests administered by Clinician. Integration of individual tests administered by psychometrist with NSE/testing by clinician, review of records/history/collaborative information, case Conceptualization, treatment planning, clinical decision making, report writing, coordination Of Care. Psychometry test codes as time spent by psychometrist administering and scoring neurocognitive/psychological tests under supervision of neuropsychologist.  Integral services including scoring of raw data, data interpretation, case conceptualization, report writing etcetera were initiated after the patient finished testing/raw data collected and was completed on the date the report was signed. This note will not be viewable in [address]

## 2019-07-11 NOTE — LETTER
7/19/19 Patient: Waqas Gonzalez YOB: 1945 Date of Visit: 7/11/2019 Dalia Sorenson MD 
[place] [address] [address] [facility] 99 [address] VIA In Basket Dear Dalia Sorenson MD, Thank you for referring Ms. Raiza Dorado to [address] [place] [address] for evaluation. My notes for this consultation are attached. If you have questions, please do not hesitate to call me. I look forward to following your patient along with you.  
 
 
Sincerely, 
 
Maryanne Cooper, PHD

## 2019-07-19 RX ORDER — SIMVASTATIN 20 MG/1
TABLET, FILM COATED ORAL
Qty: 90 TAB | Refills: 0 | Status: SHIPPED | OUTPATIENT
Start: 2019-07-19 | End: 2019-08-30 | Stop reason: ALTCHOICE

## 2019-07-23 ENCOUNTER — TELEPHONE (OUTPATIENT)
Dept: NEUROLOGY | Age: 74
End: 2019-07-23

## 2019-08-01 LAB — HBA1C MFR BLD HPLC: 7.7 %

## 2019-08-08 DIAGNOSIS — E03.9 ACQUIRED HYPOTHYROIDISM: ICD-10-CM

## 2019-08-08 RX ORDER — LEVOTHYROXINE SODIUM 50 UG/1
TABLET ORAL
Qty: 20 TAB | Refills: 0 | Status: SHIPPED | OUTPATIENT
Start: 2019-08-08 | End: 2019-08-28 | Stop reason: SDUPTHER

## 2019-08-09 ENCOUNTER — HOSPITAL ENCOUNTER (OUTPATIENT)
Dept: LAB | Age: 74
Discharge: HOME | End: 2019-08-09
Payer: MEDICARE

## 2019-08-09 ENCOUNTER — OFFICE VISIT (OUTPATIENT)
Dept: INTERNAL MEDICINE CLINIC | Age: 74
End: 2019-08-09

## 2019-08-09 ENCOUNTER — TELEPHONE (OUTPATIENT)
Dept: NEUROLOGY | Age: 74
End: 2019-08-09

## 2019-08-09 VITALS
BODY MASS INDEX: 38.53 KG/M2 | OXYGEN SATURATION: 90 % | SYSTOLIC BLOOD PRESSURE: 155 MMHG | DIASTOLIC BLOOD PRESSURE: 86 MMHG | HEART RATE: 72 BPM | RESPIRATION RATE: 14 BRPM | HEIGHT: 62 IN | TEMPERATURE: 98.1 F | WEIGHT: 209.4 LBS

## 2019-08-09 DIAGNOSIS — Z79.4 CONTROLLED TYPE 2 DIABETES MELLITUS WITH DIABETIC POLYNEUROPATHY, WITH LONG-TERM CURRENT USE OF INSULIN (HCC): ICD-10-CM

## 2019-08-09 DIAGNOSIS — R41.3 MEMORY CHANGES: ICD-10-CM

## 2019-08-09 DIAGNOSIS — E07.9 THYROID DISORDER: ICD-10-CM

## 2019-08-09 DIAGNOSIS — I10 ESSENTIAL HYPERTENSION: Primary | ICD-10-CM

## 2019-08-09 DIAGNOSIS — E11.42 CONTROLLED TYPE 2 DIABETES MELLITUS WITH DIABETIC POLYNEUROPATHY, WITH LONG-TERM CURRENT USE OF INSULIN (HCC): ICD-10-CM

## 2019-08-09 DIAGNOSIS — E78.2 MIXED HYPERLIPIDEMIA: ICD-10-CM

## 2019-08-09 DIAGNOSIS — E53.8 B12 DEFICIENCY: ICD-10-CM

## 2019-08-09 PROCEDURE — 82607 VITAMIN B-12: CPT

## 2019-08-09 PROCEDURE — 83735 ASSAY OF MAGNESIUM: CPT

## 2019-08-09 PROCEDURE — 84443 ASSAY THYROID STIM HORMONE: CPT

## 2019-08-09 PROCEDURE — 80053 COMPREHEN METABOLIC PANEL: CPT

## 2019-08-09 PROCEDURE — 82043 UR ALBUMIN QUANTITATIVE: CPT

## 2019-08-09 PROCEDURE — 36415 COLL VENOUS BLD VENIPUNCTURE: CPT

## 2019-08-09 NOTE — PROGRESS NOTES
Reid Pritchett is a 76 y.o. female and presents with Thyroid Problem; Medication Refill; and Heartburn  . Patient presents with her  Joyce Wharton. Patient presents today to go over her memory test results from Dr. Chelo Hills. She is here to also follow-up with regards to her blood pressure and cholesterol. Since last visit she has been seen by Dr. Brandon Lawrence. Diabetes   bs are running high from ingrown toenail and uri. She was seen by dr. Brandon Lawrence. This was done in September  She reports she has not received her high-dose flu shot yet. She denies hypoglycemia  Dr. Sage Oliva + cataract, no diabetic retinopathy, Dec or Jan (opthal)  Podiatry 9 months ago becaseu of ingrown toenail.    humolag 8 units tid baseline but will increase   Patient's last hemoglobin A1c was 7.7 this month in August.  Her  reports that the patient does not sleep well due to checking her blood sugar to see if it is low. Patient reports she is feels shaky around 100. If she wakes up and her blood sugar is at 100 she will go and eat a snack in the kitchen. She is not comfortable with finding an appropriate snack. She has not discussed this with her diabetic nutritionist at Dr. Jennifer Witt office. Subjective:   Reid Pritchett is a 76 y.o. female with hypertension. Hypertension ROS: taking medications as instructed, no medication side effects noted, no TIA's, no chest pain on exertion, no dyspnea on exertion, no swelling of ankles. New concerns she is tolerating her losartan 50/12.5 and losartan 25 mg. She denies edema. Saw Dr. Rajendra Hardy, pt is on CPAP machine  Patient admits she is not checking her blood pressure at home. SUBJECTIVE: Reid Pritchett is a 76 y.o. female here for follow up of hypothyroidism. Lab Results   Component Value Date/Time    TSH 5.240 (H) 11/07/2018 09:24 AM     Thyroid ROS: denies fatigue, weight changes, heat/cold intolerance, bowel/skin changes or CVS symptoms.   Patient is currently not being followed by Dr. Meghna Mix for her thyroid. Memory issues  Report from Dr. Delmer Byrd has psychology reviewed with patient and . She reports she is not sleeping well. He reports she is not sleeping well due to her blood sugar lows. CAD  She is follwed by Dr. Amisha Magallon. Patient reports she has been seen by Dr. Amisha Magallon once. She would like to make a follow-up. Her cholesterol was reviewed with her and her  at this visit. It was last done in November 2018. She is going 3 times per week but prob 2 x/week  No cp or sob at rest. She does have some sob exertion stairs. She is a little winded after 8-10 stairs but continues to push forward. Dr. Emory Treadwell did echo 2017 and evaluated fro PVD  No se of cholesterol medication, simva 20 mg    Review of Systems  Constitutional: negative for fevers, chills, anorexia and weight loss  Eyes:   negative for visual disturbance and irritation  ENT:   negative for tinnitus,sore throat,nasal congestion,ear pains. hoarseness  Respiratory:  negative for cough, hemoptysis, dyspnea,wheezing  CV:   negative for chest pain, palpitations, lower extremity edema  GI:   negative for nausea, vomiting, diarrhea, abdominal pain,melena  Endo:               negative for polyuria,polydipsia,polyphagia,heat intolerance  Genitourinary: negative for frequency, dysuria and hematuria  Integument:  negative for rash and pruritus  Hematologic:  negative for easy bruising and gum/nose bleeding  Musculoskel: negative for myalgias, arthralgias, back pain, muscle weakness, joint pain  Neurological:  negative for headaches, dizziness, vertigo, memory problems and gait   Behavl/Psych: negative for feelings of anxiety, depression, mood changes    Past Medical History:   Diagnosis Date    Arthritis     CAD (coronary artery disease) 2006    STENT PLACED>PLAQUE BROKE OFF--HAD \"MINOR MI\"    Deviated septum 12/20/13    HAS TROUBLE BREATHING THROUGH LEFT SIDE; CT SHOWED POLYP    Diabetes ([facility] [address]     Heart disease     Hypertension     Incontinence     Nasal polyp     Unspecified sleep apnea 13    CAN'T TOLERATE CPAP     Past Surgical History:   Procedure Laterality Date    CARDIAC SURG PROCEDURE UNLIST      CARDIAC CATH;ANGIOPLASTY WITH STENT    HX  SECTION      X3    HX COLONOSCOPY      HX HEENT  ? SEPTOPLASTY     Social History     Socioeconomic History    Marital status:      Spouse name: Not on file    Number of children: Not on file    Years of education: Not on file    Highest education level: Not on file   Tobacco Use    Smoking status: Former Smoker     Packs/day: 1.00     Years: 5.00     Pack years: 5.00     Last attempt to quit: 1970     Years since quittin.6    Smokeless tobacco: Never Used   Substance and Sexual Activity    Alcohol use: Yes     Comment: \"VERY LITTLE ALCOHOL\"    Drug use: No   Social History Narrative            3 children women 36, 40, 40 healthy other than skin issues     Family History   Problem Relation Age of Onset    Heart Disease Mother     Heart Surgery Mother         VALVE REPLACED AT AGE 72    Heart Attack Father 46        MASSIVE     Anesth Problems Neg Hx      Current Outpatient Medications   Medication Sig Dispense Refill    levothyroxine (SYNTHROID) 50 mcg tablet TAKE 1 TABLET BY MOUTH EVERY MORNING 20 Tab 0    simvastatin (ZOCOR) 20 mg tablet TAKE 1 TABLET BY MOUTH EVERY DAY IN THE EVENING 90 Tab 0    losartan (COZAAR) 25 mg tablet TAKE 2 TABS BY MOUTH DAILY. IF LIGHTHEADED OR DIZZYNESS DECREASE TO 1 TAB BY MOUTH DAILY 180 Tab 0    sertraline (ZOLOFT) 100 mg tablet TAKE 1 TABLET BY MOUTH EVERY DAY IN THE EVENING 90 Tab 3    losartan-hydroCHLOROthiazide (HYZAAR) 50-12.5 mg per tablet Take 1 Tab by mouth two (2) times a day. Please make follow up lab and med check 180 Tab 0    metoprolol tartrate (LOPRESSOR) 50 mg tablet Take 0.5 Tabs by mouth two (2) times a day.  90 Tab 2    ACCU-CHEK COMPACT PLUS TEST strp USE TO TEST BLOOD SUGAR 4 TIMES DAILY. DX E11.9 357 Strip 3    magnesium oxide (MAG-OX) 400 mg tablet TAKE 1 TAB BY MOUTH TWO (2) TIMES A DAY. 240 Tab 1    Blood Sugar Diagnostic, Drum (ACCU-CHEK COMPACT PLUS TEST) strp USE TO TEST BLOOD SUGAR 4 TIMES DAILY. DX E11.9 357 Strip 0    metFORMIN ER (GLUCOPHAGE XR) 500 mg tablet TAKE 4 TABLETS BY MOUTH EVERY DAY  3    insulin glargine (LANTUS) 100 unit/mL injection 20 Units by SubCUTAneous route two (2) times a day. (Patient taking differently: 46 Units by SubCUTAneous route two (2) times a day. 46 in the evening) 1 Vial 3    HUMALOG KWIKPEN 100 unit/mL kwikpen   3    Cholecalciferol, Vitamin D3, (VITAMIN D3) 1,000 unit cap Take 1 Cap by mouth nightly. 30 Cap 3    Aspirin, Buffered 81 mg tab Take  by mouth daily.  omega-3 acid ethyl esters (LOVAZA) 1 gram capsule Take 2 g by mouth two (2) times a day.  magnesium oxide (MAG-OX) 400 mg tablet TAKE 1 TAB BY MOUTH TWO (2) TIMES A DAY. 240 Tab 1    chlorpheniramine-dm (CORICIDIN HBP COUGH AND COLD) 4-30 mg tab Take as directed 20 Tab 0    pseudoephedrine (SUDAFED) 30 mg tablet Take 1 Tab by mouth every six (6) hours as needed for Congestion.  20 Tab 0     Allergies   Allergen Reactions    Adhesive Rash and Itching    Advil [Ibuprofen] Swelling     FEET SWELL    Ceftin [Cefuroxime Axetil] Itching    Lotensin [Benazepril] Cough    Penicillins Swelling    Sulfa (Sulfonamide Antibiotics) Rash, Itching and Swelling       Objective:  Visit Vitals  /86 (BP 1 Location: Left arm, BP Patient Position: Sitting)   Pulse 72   Temp 98.1 °F (36.7 °C) (Oral)   Resp 14   Ht 5' 2\" (1.575 m)   Wt 209 lb 6.4 oz (95 kg)   SpO2 90%   BMI 38.30 kg/m²     Physical Exam:   General appearance - alert, well appearing, and in no distress  Mental status - alert, oriented to person, place, and time  EYE-SHARAD, EOMI, corneas normal, no foreign bodies, visual acuity normal both eyes, no periorbital cellulitis  ENT-ENT exam normal, no neck nodes or sinus tenderness  Nose - normal and patent, no erythema, discharge or polyps  Mouth - mucous membranes moist, pharynx normal without lesions  Neck - supple, no significant adenopathy   Chest - clear to auscultation, no wheezes, rales or rhonchi, symmetric air entry   Heart - normal rate, regular rhythm, normal S1, S2, no murmurs, rubs, clicks or gallops   Abdomen - soft, nontender, nondistended, no masses or organomegaly, obese, no rebound  Lymph- no adenopathy palpable  Ext-peripheral pulses normal, no pedal edema, no clubbing or cyanosis, second toe on right healing, hematoma at base of phalanges  Skin-Warm and dry. no hyperpigmentation, vitiligo, or suspicious lesions, scattered resolved superifical skin bites, no erythema  Neuro -alert, oriented, normal speech, no focal findings or movement disorder noted  Neck-normal C-spine, no tenderness, full ROM without pain      Results for orders placed or performed in visit on 08/02/19   AMB EXT HGBA1C   Result Value Ref Range    Hemoglobin A1c, External 7.7      Diagnoses and all orders for this visit:    1. Essential hypertension  Not optimally controlled I asked for patient to check her blood pressure and let me know how it is running. I told her I would like to see her at 130/80 as a goal.  If her losartan and hydrochlorothiazide are not holding her at home I will change her to Benicar hydrochlorothiazide. She will follow-up with me in 1 month. Low threshold to transition her to Benicar 50/88  -     METABOLIC PANEL, COMPREHENSIVE  -     MAGNESIUM  -     MICROALBUMIN, UR, RAND W/ MICROALB/CREAT RATIO    2. Thyroid disorder  Patient appears to be hypothyroid at her last lab. This can affect her memory as well as her diabetes. She will need to have her thyroid optimized to help with both her brain memory and diabetes. -     TSH 3RD GENERATION    3.  B12 deficiency  Patient has been on metformin for several years. Assess for B12 deficiency  May help with her memory issues  -     VITAMIN B12 & FOLATE    4. Memory changes  Report reviewed with patient and . Discussed that we need to optimize behavioral things such as improving her sleep, diabetes control, thyroid optimization at goal, blood pressure stabilization. Also I noted to her if she has stress to continue melatonin at night and if needed I can add on BuSpar in the future. 5. Controlled type 2 diabetes mellitus with diabetic polyneuropathy, with long-term current use of insulin ([address]  Not optimally controlled  She will follow-up with her diabetic nutritionist and find out what her blood sugar goal should be for the daytime as well as nighttime. She will also asked the nutritionist what kind of snack she can eat when she wakes up in the middle the night. 6. Mixed hyperlipidemia  Cholesterol optimally controlled  I discussed with her that she may want to do at cardiac heart scan. She is high risk and may have some left anterior descending disease. We will ask Hayley Ayon to pull Dr. Jose Schwab's note. I also think she needs to be transitioned from simvastatin to Crestor if she can tolerate it. I spent 40 minutes with this patient and  and greater than 50% of the time spent in management and counseling with regards to her cardiovascular risk optimization as it affects her memory, thyroid optimization, compliance with blood pressure checks, transitioning some of her medications  for better efficacy namely transition to Benicar hydrochlorothiazide and Crestor. She also may benefit from a heart scan to give us another tool to look at her cardiovascular risk.

## 2019-08-09 NOTE — TELEPHONE ENCOUNTER
----- Message from Gertrudis Frausto sent at 8/9/2019  9:04 AM EDT -----  Regarding: Dr. Nancy Dejesus  Pt requesting a call back for test results. Best contact [phone].

## 2019-08-10 LAB
ALBUMIN SERPL-MCNC: 4.5 G/DL (ref 3.5–4.8)
ALBUMIN/CREAT UR: 1870.2 MG/G CREAT (ref 0–30)
ALBUMIN/GLOB SERPL: 1.8 {RATIO} (ref 1.2–2.2)
ALP SERPL-CCNC: 107 IU/L (ref 39–117)
ALT SERPL-CCNC: 37 IU/L (ref 0–32)
AST SERPL-CCNC: 30 IU/L (ref 0–40)
BILIRUB SERPL-MCNC: 0.5 MG/DL (ref 0–1.2)
BUN SERPL-MCNC: 21 MG/DL (ref 8–27)
BUN/CREAT SERPL: 22 (ref 12–28)
CALCIUM SERPL-MCNC: 9.7 MG/DL (ref 8.7–10.3)
CHLORIDE SERPL-SCNC: 99 MMOL/L (ref 96–106)
CO2 SERPL-SCNC: 21 MMOL/L (ref 20–29)
CREAT SERPL-MCNC: 0.96 MG/DL (ref 0.57–1)
CREAT UR-MCNC: 107.4 MG/DL
FOLATE SERPL-MCNC: 18.5 NG/ML
GLOBULIN SER CALC-MCNC: 2.5 G/DL (ref 1.5–4.5)
GLUCOSE SERPL-MCNC: 222 MG/DL (ref 65–99)
INTERPRETATION: NORMAL
MAGNESIUM SERPL-MCNC: 1.7 MG/DL (ref 1.6–2.3)
MICROALBUMIN UR-MCNC: 2008.6 UG/ML
POTASSIUM SERPL-SCNC: 4.5 MMOL/L (ref 3.5–5.2)
PROT SERPL-MCNC: 7 G/DL (ref 6–8.5)
SODIUM SERPL-SCNC: 140 MMOL/L (ref 134–144)
TSH SERPL DL<=0.005 MIU/L-ACNC: 2.89 UIU/ML (ref 0.45–4.5)
VIT B12 SERPL-MCNC: 465 PG/ML (ref 232–1245)

## 2019-08-11 DIAGNOSIS — E11.42 CONTROLLED TYPE 2 DIABETES MELLITUS WITH DIABETIC POLYNEUROPATHY, WITH LONG-TERM CURRENT USE OF INSULIN (HCC): Primary | ICD-10-CM

## 2019-08-11 DIAGNOSIS — Z79.4 CONTROLLED TYPE 2 DIABETES MELLITUS WITH DIABETIC POLYNEUROPATHY, WITH LONG-TERM CURRENT USE OF INSULIN (HCC): Primary | ICD-10-CM

## 2019-08-14 ENCOUNTER — TELEPHONE (OUTPATIENT)
Dept: INTERNAL MEDICINE CLINIC | Age: 74
End: 2019-08-14

## 2019-08-14 NOTE — TELEPHONE ENCOUNTER
Pt was seen at [facility] in [address] for potential infected gallbladder. Wanted to inform Dr. Gato Casillas they will be sending copies of notes and tests. Thanks.

## 2019-08-15 NOTE — TELEPHONE ENCOUNTER
Returned call to pt. I spoke with pts  who states pt was seen at [facility] in [facility] as she was having epigastric pain and they wanted to rule out any cardiac problems. She was admitted for evaluation. EKG, troponin level, abdominal ultrasound, chest xray, and HIDA scan were normal. WBC's were 20,000. IV antibiotics were administered as it was believed her gallstones were causing her increased WBC's. Pts  reports that the pt has had gallstones for years but has never had further evaluation for it as she has been asymptomatic. Pt does have diarrhea and persistent epigastric pain, denies nausea, vomiting, fever, shortness of breath, or chest pain. Pt was scheduled with NP tomorrow. She was rescheduled with MD per NP request. Dr. Mahsa Andrade is out of the office tomorrow, she has been rescheduled to see Dr Jackelin Chaudhary. I advised pts  that Dr Jackelin Chaudhary may evaluate her current acute symptoms, but she needs to schedule a follow up appointment with Dr. Mahsa Andrade to follow up with anything further. He voices understanding and thanks.

## 2019-08-15 NOTE — TELEPHONE ENCOUNTER
----- Message from Zackary Jorge sent at 8/15/2019  4:46 PM EDT -----  Regarding: Dr. Lesley Beaver Patient return call    Caller's first and last name and relationship (if not the patient):      Best contact number(s): [phone]      Whose call is being returned: Returning a missed call from Heather Hernández.       Details to clarify the request:      Zackary Jorge

## 2019-08-15 NOTE — TELEPHONE ENCOUNTER
Attempted to obtain records from [facility]. records department is closed and will be back open tomorrow at 9am.

## 2019-08-16 ENCOUNTER — OFFICE VISIT (OUTPATIENT)
Dept: INTERNAL MEDICINE CLINIC | Age: 74
End: 2019-08-16

## 2019-08-16 ENCOUNTER — HOSPITAL ENCOUNTER (OUTPATIENT)
Dept: LAB | Age: 74
Discharge: HOME | End: 2019-08-16
Payer: MEDICARE

## 2019-08-16 VITALS
TEMPERATURE: 97.6 F | WEIGHT: 205 LBS | HEIGHT: 62 IN | BODY MASS INDEX: 37.73 KG/M2 | DIASTOLIC BLOOD PRESSURE: 74 MMHG | SYSTOLIC BLOOD PRESSURE: 164 MMHG | HEART RATE: 58 BPM | RESPIRATION RATE: 18 BRPM | OXYGEN SATURATION: 93 %

## 2019-08-16 DIAGNOSIS — I10 ESSENTIAL HYPERTENSION: ICD-10-CM

## 2019-08-16 DIAGNOSIS — E07.9 THYROID DISORDER: ICD-10-CM

## 2019-08-16 DIAGNOSIS — R10.13 EPIGASTRIC PAIN: Primary | ICD-10-CM

## 2019-08-16 DIAGNOSIS — E11.21 TYPE 2 DIABETES WITH NEPHROPATHY (HCC): ICD-10-CM

## 2019-08-16 PROCEDURE — 85025 COMPLETE CBC W/AUTO DIFF WBC: CPT

## 2019-08-16 PROCEDURE — 80053 COMPREHEN METABOLIC PANEL: CPT

## 2019-08-16 PROCEDURE — 36415 COLL VENOUS BLD VENIPUNCTURE: CPT

## 2019-08-16 RX ORDER — METRONIDAZOLE 500 MG/1
TABLET ORAL 3 TIMES DAILY
COMMUNITY
End: 2019-09-05 | Stop reason: ALTCHOICE

## 2019-08-16 RX ORDER — LEVOFLOXACIN 750 MG/1
750 TABLET ORAL DAILY
COMMUNITY
End: 2019-09-05 | Stop reason: ALTCHOICE

## 2019-08-16 NOTE — PROGRESS NOTES
HISTORY OF PRESENT ILLNESS  Kimo Short is a 76 y.o. female. HPI Pt presents with    Epigastric pain: Pt recalls that a many years ago she had a scan done and she was informed she has gallbladder stones. Her  notes that she was taken to the ED on Sunday c/o constant epigastric distress. In the ED EKG, Robin Scan,  and troponin with normal findings. 6 hours later still normal. Chest XR normal findings. They decided to admit her to US abdomen and CT of abdomen and they found enlarged liver and gallstones. She was discharged and rx'd Levaquin and Flagil due to elevated WC count. She has continued a clear bland diet since the hospital. She notes that discomfort feels like she has something \"stuck in her throat\". Denies food trigger, cough, CP, SOB, abnormal BM's. Diabetic Review of Systems - further diabetic ROS: no polyuria or polydipsia, no chest pain, dyspnea or TIA's, no numbness, tingling or pain in extremities. Other symptoms and concerns:  Pt states that she thinks her sugars are stable. Her  thinks that her sugars are not stable. Hypertension ROS: taking medications as instructed, no medication side effects noted, no TIA's, no chest pain on exertion, no dyspnea on exertion, no swelling of ankles. New concerns: BP in office today is 164/74. Thyroid disease. Lab Results   Component Value Date/Time    TSH 2.890 08/09/2019 11:30 AM     Thyroid ROS: denies fatigue, weight changes, heat/cold intolerance, bowel/skin changes or CVS symptoms. Review of Systems   Gastrointestinal:        Epigastric distress   All other systems reviewed and are negative. Physical Exam   Constitutional: She is oriented to person, place, and time. She appears well-developed and well-nourished. HENT:   Head: Normocephalic and atraumatic.    Right Ear: External ear normal.   Left Ear: External ear normal.   Nose: Nose normal.   Mouth/Throat: Oropharynx is clear and moist.   Eyes: Pupils are equal, round, and reactive to light. Conjunctivae and EOM are normal.   Neck: Normal range of motion. Neck supple. Carotid bruit is not present. No thyroid mass and no thyromegaly present. Cardiovascular: Normal rate, regular rhythm, normal heart sounds and intact distal pulses. Pulmonary/Chest: Effort normal and breath sounds normal. Right breast exhibits no inverted nipple, no mass, no nipple discharge, no skin change and no tenderness. Left breast exhibits no inverted nipple, no mass, no nipple discharge, no skin change and no tenderness. No breast swelling, tenderness, discharge or bleeding. Breasts are symmetrical.   Abdominal: Soft. Bowel sounds are normal.   Slight tenderness in RUQ upon palpation   Genitourinary: Rectum normal and vagina normal. Rectal exam shows anal tone normal and guaiac negative stool. No breast swelling, tenderness, discharge or bleeding. Musculoskeletal: Normal range of motion. Neurological: She is alert and oriented to person, place, and time. She has normal strength. No cranial nerve deficit or sensory deficit. Coordination normal.   Skin: Skin is warm and dry. No abrasion and no rash noted. Psychiatric: She has a normal mood and affect. Her behavior is normal. Judgment and thought content normal.   Nursing note and vitals reviewed. ASSESSMENT and PLAN  Diagnoses and all orders for this visit:    1. Epigastric pain  Ordered labs to compare results with that of the ED. Discussed with pt that her pain is likely caused by a GI issue. Reccommended pt consider seeing a GI to perform an endoscopy to evaluate her epigastric pain. Advised pt to advance her bland diet to her diabetic diet as tolerated. -     CBC WITH AUTOMATED DIFF  -     METABOLIC PANEL, COMPREHENSIVE    2. Type 2 diabetes with nephropathy (HCC)  Sugars stable. Continue to follow diabetic diet and monitor sugars.      3. Essential hypertension  Discussed with pt that her sx could also be caused by an underlying heart condition. Recommended pt to see a cardiologist to evaluate her heart. 4. Thyroid disorder  Thyroid stable. I do not recommend a change in medications. Lab results and schedule of future lab studies reviewed with patient. Reviewed diet, exercise and weight control. Written by Americo Aguilera, as dictated by Jarett Lauren MD.     Current diagnosis and concerns discussed with pt at length. Understands risks and benefits or current treatment plan and medications and accepts the treatment and medication with any possible risks. Pt asks appropriate questions which were answered. Pt instructed to call with any concerns or problems. This note will not be viewable in [address]

## 2019-08-17 LAB
ALBUMIN SERPL-MCNC: 4.5 G/DL (ref 3.5–4.8)
ALBUMIN/GLOB SERPL: 1.9 {RATIO} (ref 1.2–2.2)
ALP SERPL-CCNC: 87 IU/L (ref 39–117)
ALT SERPL-CCNC: 36 IU/L (ref 0–32)
AST SERPL-CCNC: 34 IU/L (ref 0–40)
BASOPHILS # BLD AUTO: 0 X10E3/UL (ref 0–0.2)
BASOPHILS NFR BLD AUTO: 0 %
BILIRUB SERPL-MCNC: 0.6 MG/DL (ref 0–1.2)
BUN SERPL-MCNC: 15 MG/DL (ref 8–27)
BUN/CREAT SERPL: 14 (ref 12–28)
CALCIUM SERPL-MCNC: 10.5 MG/DL (ref 8.7–10.3)
CHLORIDE SERPL-SCNC: 100 MMOL/L (ref 96–106)
CO2 SERPL-SCNC: 22 MMOL/L (ref 20–29)
CREAT SERPL-MCNC: 1.11 MG/DL (ref 0.57–1)
EOSINOPHIL # BLD AUTO: 0.3 X10E3/UL (ref 0–0.4)
EOSINOPHIL NFR BLD AUTO: 3 %
ERYTHROCYTE [DISTWIDTH] IN BLOOD BY AUTOMATED COUNT: 14 % (ref 12.3–15.4)
GLOBULIN SER CALC-MCNC: 2.4 G/DL (ref 1.5–4.5)
GLUCOSE SERPL-MCNC: 166 MG/DL (ref 65–99)
HCT VFR BLD AUTO: 39.7 % (ref 34–46.6)
HGB BLD-MCNC: 13.9 G/DL (ref 11.1–15.9)
IMM GRANULOCYTES # BLD AUTO: 0 X10E3/UL (ref 0–0.1)
IMM GRANULOCYTES NFR BLD AUTO: 0 %
INTERPRETATION: NORMAL
LYMPHOCYTES # BLD AUTO: 2.3 X10E3/UL (ref 0.7–3.1)
LYMPHOCYTES NFR BLD AUTO: 19 %
MCH RBC QN AUTO: 32.7 PG (ref 26.6–33)
MCHC RBC AUTO-ENTMCNC: 35 G/DL (ref 31.5–35.7)
MCV RBC AUTO: 93 FL (ref 79–97)
MONOCYTES # BLD AUTO: 0.8 X10E3/UL (ref 0.1–0.9)
MONOCYTES NFR BLD AUTO: 7 %
NEUTROPHILS # BLD AUTO: 8.6 X10E3/UL (ref 1.4–7)
NEUTROPHILS NFR BLD AUTO: 71 %
PLATELET # BLD AUTO: 267 X10E3/UL (ref 150–450)
POTASSIUM SERPL-SCNC: 4.1 MMOL/L (ref 3.5–5.2)
PROT SERPL-MCNC: 6.9 G/DL (ref 6–8.5)
RBC # BLD AUTO: 4.25 X10E6/UL (ref 3.77–5.28)
SODIUM SERPL-SCNC: 139 MMOL/L (ref 134–144)
WBC # BLD AUTO: 11.9 X10E3/UL (ref 3.4–10.8)

## 2019-08-19 ENCOUNTER — TELEPHONE (OUTPATIENT)
Dept: INTERNAL MEDICINE CLINIC | Age: 74
End: 2019-08-19

## 2019-08-19 NOTE — TELEPHONE ENCOUNTER
I saw her at end of last week wanting a GI appt this week- not sure if this has been done yet please follow up and contact them

## 2019-08-20 NOTE — TELEPHONE ENCOUNTER
Apptointment scheduled 8/27/19 at 10:00am with Munira Lyon NP at [facility]. [facility] message sent to pt to notify of appt. Records fax to [phone].

## 2019-08-22 ENCOUNTER — HOSPITAL ENCOUNTER (OUTPATIENT)
Dept: CT IMAGING | Age: 74
Discharge: HOME | End: 2019-08-22
Attending: INTERNAL MEDICINE
Payer: SELF-PAY

## 2019-08-22 DIAGNOSIS — E11.42 CONTROLLED TYPE 2 DIABETES MELLITUS WITH DIABETIC POLYNEUROPATHY, WITH LONG-TERM CURRENT USE OF INSULIN (HCC): ICD-10-CM

## 2019-08-22 DIAGNOSIS — Z79.4 CONTROLLED TYPE 2 DIABETES MELLITUS WITH DIABETIC POLYNEUROPATHY, WITH LONG-TERM CURRENT USE OF INSULIN (HCC): ICD-10-CM

## 2019-08-22 PROCEDURE — 75571 CT HRT W/O DYE W/CA TEST: CPT

## 2019-08-23 ENCOUNTER — TELEPHONE (OUTPATIENT)
Dept: INTERNAL MEDICINE CLINIC | Age: 74
End: 2019-08-23

## 2019-08-23 NOTE — TELEPHONE ENCOUNTER
----- Message from Amandeep Hearn sent at 8/23/2019 10:43 AM EDT -----  Regarding: Dr. Andriy Duffy General Message/Vendor Calls    Caller's first and last name: Nick Lopez      Reason for call: Requesting a call back regarding getting a referral for a cardiologist due to his wife's Calcium score on her CT was greater than 2000.       Call back required yes/no and why: Yes       Best contact number(s): [phone]      Details to clarify the request:      Amandeep Hearn

## 2019-08-23 NOTE — CARDIO/PULMONARY
Reached patient at her given mobile number and shared her coronary artery calcium score of 2223 with her and her  who she put on the phone. We discussed the meaning of this score and our recommendation that she follow up with a cardiologist.  Patient plans to contact Dr. Jackquline Carrel office to discuss setting this up with whoever she recommends. Neither patient nor her  have any further questions at this time.

## 2019-08-24 DIAGNOSIS — I25.10 CVD (CARDIOVASCULAR DISEASE): Primary | ICD-10-CM

## 2019-08-24 NOTE — PROGRESS NOTES
Spoke with patient and . Discussed findings of heart CT.   Patient has seen Dr. Edgardo Nguyen at [facility] in the past.  She prefers to follow-up with her however if not available would like to see a [facility] cardiologist.

## 2019-08-26 ENCOUNTER — TELEPHONE (OUTPATIENT)
Dept: INTERNAL MEDICINE CLINIC | Age: 74
End: 2019-08-26

## 2019-08-26 NOTE — TELEPHONE ENCOUNTER
Called Dr Mary Gilliam office to schedule an appt, first available is 12/17/2019. Dr Flakita Godinez had an appt available 10/30/19. Dr. Magaly Holly states she wants the pt to be seen sooner, however their practive is unable to get pt in sooner. I called Dr Farzana Gomez office, per Dr. Magaly Holly, to schedule an appt, he is available at the [facility] location on 9/9/19 at 9:40pm with a 3:10pm arrival. Pt has been advised of appt date, time, location, and CAV phone number, and thanks for the appt. I advised her that I am still trying to get records from [facility] in [facility]. Pt thanks for the information and disconnects the line.

## 2019-08-26 NOTE — TELEPHONE ENCOUNTER
Called pt, advised that we have been able to have her seen sooner at [facility] with Dr Cruz 8/29/19 at 11am at [facility] [address].  Pt voices understanding and thanks

## 2019-08-26 NOTE — TELEPHONE ENCOUNTER
----- Message from Una Jang MD sent at 8/24/2019 11:22 AM EDT -----  Regarding: Monday morning  Derik Gerard please remind me to call its learning cardiology Monday morning. Please also fax patient's heart CT scan and labs to Dr. Deepti Ang. I would like to have her see Dr. Yaa Lockhart soon as possible or another cardiologist in her group. Also has been was discussing results from [facility].  If they are not in our media please reach out to patient to get information sent here.

## 2019-08-28 ENCOUNTER — TELEPHONE (OUTPATIENT)
Dept: INTERNAL MEDICINE CLINIC | Age: 74
End: 2019-08-28

## 2019-08-28 DIAGNOSIS — E03.9 ACQUIRED HYPOTHYROIDISM: ICD-10-CM

## 2019-08-28 RX ORDER — LEVOTHYROXINE SODIUM 50 UG/1
TABLET ORAL
Qty: 20 TAB | Refills: 0 | Status: SHIPPED | OUTPATIENT
Start: 2019-08-28 | End: 2019-09-21 | Stop reason: SDUPTHER

## 2019-08-28 NOTE — TELEPHONE ENCOUNTER
Maria Ines olson/ Yvon Oscar is requesting medical records from [facility]. Dr. Chucky Verdin     Phone# [phone]    Please fax to [phone] attn.  Maria Ines

## 2019-08-28 NOTE — TELEPHONE ENCOUNTER
Returned call to Basilio Callahan. She has been advised that we are unable to release records from other practices/hospitals. I advised her to contact [facility] and request to speak with medical records and they will give her the fax number to request records.  She voices understanding and thanks

## 2019-08-28 NOTE — TELEPHONE ENCOUNTER
Returned call to pts , Reid Coombs, to advise that we have received the records. I also advised that the pts cardiology appt was rescheduled to another provider per Dr. Fawad Bowling request. Sameer Marr that the new appt is scheduled for 8/30/19 at 1:40pm with Dr John Mitchell, still at the [address] [place] [address] location.  Reid Coombs thanks and disconnects the line

## 2019-08-28 NOTE — TELEPHONE ENCOUNTER
Patient's  is calling to confirm if records have been received from [facility] in [place] , states the records werent sent this week, he can be reached at [phone]

## 2019-08-30 ENCOUNTER — OFFICE VISIT (OUTPATIENT)
Dept: CARDIOLOGY CLINIC | Age: 74
End: 2019-08-30

## 2019-08-30 VITALS
HEART RATE: 62 BPM | OXYGEN SATURATION: 96 % | HEIGHT: 62 IN | SYSTOLIC BLOOD PRESSURE: 130 MMHG | WEIGHT: 204.6 LBS | BODY MASS INDEX: 37.65 KG/M2 | DIASTOLIC BLOOD PRESSURE: 71 MMHG

## 2019-08-30 DIAGNOSIS — E11.9 TYPE 2 DIABETES MELLITUS WITHOUT COMPLICATION, WITH LONG-TERM CURRENT USE OF INSULIN (HCC): ICD-10-CM

## 2019-08-30 DIAGNOSIS — I10 ESSENTIAL HYPERTENSION: ICD-10-CM

## 2019-08-30 DIAGNOSIS — I25.83 CORONARY ARTERY DISEASE DUE TO LIPID RICH PLAQUE: ICD-10-CM

## 2019-08-30 DIAGNOSIS — R93.1 ELEVATED CORONARY ARTERY CALCIUM SCORE: Primary | ICD-10-CM

## 2019-08-30 DIAGNOSIS — I25.10 CORONARY ARTERY DISEASE DUE TO LIPID RICH PLAQUE: ICD-10-CM

## 2019-08-30 DIAGNOSIS — Z79.4 TYPE 2 DIABETES MELLITUS WITHOUT COMPLICATION, WITH LONG-TERM CURRENT USE OF INSULIN (HCC): ICD-10-CM

## 2019-08-30 DIAGNOSIS — E66.01 SEVERE OBESITY (BMI 35.0-39.9) WITH COMORBIDITY (HCC): ICD-10-CM

## 2019-08-30 RX ORDER — ROSUVASTATIN CALCIUM 20 MG/1
20 TABLET, COATED ORAL
Qty: 30 TAB | Refills: 11 | Status: SHIPPED | OUTPATIENT
Start: 2019-08-30 | End: 2020-08-11

## 2019-08-30 RX ORDER — METOPROLOL TARTRATE 50 MG/1
50 TABLET ORAL 2 TIMES DAILY
Qty: 90 TAB | Refills: 3 | Status: SHIPPED | OUTPATIENT
Start: 2019-08-30 | End: 2020-02-21 | Stop reason: SDUPTHER

## 2019-08-30 RX ORDER — LOSARTAN POTASSIUM 25 MG/1
TABLET ORAL DAILY
COMMUNITY
End: 2020-01-02 | Stop reason: ALTCHOICE

## 2019-08-30 NOTE — LETTER
9/11/19 Patient: Anny Obregon YOB: 1945 Date of Visit: 8/30/2019 Angelica Connolly MD 
[address] [facility] [address] [address] VIA In Basket Dear Angelica Connolly MD, Thank you for referring Ms. Zana Ewing to [facility] for evaluation. My notes for this consultation are attached. If you have questions, please do not hesitate to call me. I look forward to following your patient along with you.  
 
 
Sincerely, 
 
Wilberto Guevara MD

## 2019-09-05 ENCOUNTER — OFFICE VISIT (OUTPATIENT)
Dept: INTERNAL MEDICINE CLINIC | Age: 74
End: 2019-09-05

## 2019-09-05 VITALS
HEIGHT: 62 IN | BODY MASS INDEX: 37.73 KG/M2 | OXYGEN SATURATION: 97 % | HEART RATE: 56 BPM | TEMPERATURE: 97.9 F | RESPIRATION RATE: 16 BRPM | SYSTOLIC BLOOD PRESSURE: 121 MMHG | DIASTOLIC BLOOD PRESSURE: 70 MMHG | WEIGHT: 205 LBS

## 2019-09-05 DIAGNOSIS — Z79.4 CONTROLLED TYPE 2 DIABETES MELLITUS WITH MICROALBUMINURIA, WITH LONG-TERM CURRENT USE OF INSULIN (HCC): ICD-10-CM

## 2019-09-05 DIAGNOSIS — R80.9 CONTROLLED TYPE 2 DIABETES MELLITUS WITH MICROALBUMINURIA, WITH LONG-TERM CURRENT USE OF INSULIN (HCC): ICD-10-CM

## 2019-09-05 DIAGNOSIS — I10 ESSENTIAL HYPERTENSION: ICD-10-CM

## 2019-09-05 DIAGNOSIS — E11.29 CONTROLLED TYPE 2 DIABETES MELLITUS WITH MICROALBUMINURIA, WITH LONG-TERM CURRENT USE OF INSULIN (HCC): ICD-10-CM

## 2019-09-05 DIAGNOSIS — E53.8 B12 DEFICIENCY: ICD-10-CM

## 2019-09-05 DIAGNOSIS — E78.2 MIXED HYPERLIPIDEMIA: Primary | ICD-10-CM

## 2019-09-05 NOTE — PROGRESS NOTES
Michael Genao is a 76 y.o. female and presents with Medication Evaluation  . Patient presents with her  Laverne Garcia. Since last visit she has been seen by Dr. Don Bain. Diabetes   Patient's last hemoglobin A1c was in the 7.7 range. She is using her continuous glucose monitor. She denies having any hypoglycemic episodes. She is being followed closely by Dr. Don Bain. She will have another appointment with her next month. She has received her high-dose flu shot yet. Dr. Mary Jane Garcia + cataract, no diabetic retinopathy, Dec or Jan (opthal)  Podiatry 9 months ago becaseu of [facility] toenail. Patient's last hemoglobin A1c was 7.7 this month in August.     Subjective:   Michael Genao is a 76 y.o. female with hypertension. Hypertension ROS: taking medications as instructed, no medication side effects noted, no TIA's, no chest pain on exertion, no dyspnea on exertion, no swelling of ankles. New concerns she is tolerating her losartan 50/12.5 and losartan 25 mg. She denies edema. Saw Dr. Elmyra Goldmann, pt is on CPAP machine   Sascha Loving has been checking patient's blood pressure at home. Her blood pressure for the most part has been in the 130/70 range. She has some episodic periods where her blood pressure is in the 150s. Memory issues  Report from Dr. Bains Forward has psychology reviewed with patient and . She reports she is not sleeping well. He reports she is not sleeping well due to her blood sugar lows. CAD  Patient has transitioned her cardiac care to Dr. Danial Mcgregor. He has switched her simvastatin to Crestor and increased her metoprolol. She has been compliant with these changes. She denies chest pain or shortness of breath. She has a follow-up with him in 6 months. She was having some epigastric pain. She has a follow-up with GI in 4 days. Dr. Bob Boyd recommended more medical management. She will look into weight watchers and a CAC.         Review of Systems  Constitutional: negative for fevers, chills, anorexia and weight loss  Eyes:   negative for visual disturbance and irritation  ENT:   negative for tinnitus,sore throat,nasal congestion,ear pains. hoarseness  Respiratory:  negative for cough, hemoptysis, dyspnea,wheezing  CV:   negative for chest pain, palpitations, lower extremity edema  GI:   negative for nausea, vomiting, diarrhea, abdominal pain,melena  Endo:               negative for polyuria,polydipsia,polyphagia,heat intolerance  Genitourinary: negative for frequency, dysuria and hematuria  Integument:  negative for rash and pruritus  Hematologic:  negative for easy bruising and gum/nose bleeding  Musculoskel: negative for myalgias, arthralgias, back pain, muscle weakness, joint pain  Neurological:  negative for headaches, dizziness, vertigo, memory problems and gait   Behavl/Psych: negative for feelings of anxiety, depression, mood changes    Past Medical History:   Diagnosis Date    Arthritis     CAD (coronary artery disease) 2006    STENT PLACED>PLAQUE BROKE OFF--HAD \"MINOR MI\"    Deviated septum 13    HAS TROUBLE BREATHING THROUGH LEFT SIDE; CT SHOWED POLYP    Diabetes ([address]     Heart disease     Hypertension     Incontinence     Nasal polyp     Unspecified sleep apnea 13    CAN'T TOLERATE CPAP     Past Surgical History:   Procedure Laterality Date    CARDIAC SURG PROCEDURE UNLIST      CARDIAC CATH;ANGIOPLASTY WITH STENT    HX  SECTION      X3    HX COLONOSCOPY      HX HEENT  ?     SEPTOPLASTY     Social History     Socioeconomic History    Marital status:      Spouse name: Not on file    Number of children: Not on file    Years of education: Not on file    Highest education level: Not on file   Tobacco Use    Smoking status: Former Smoker     Packs/day: 1.00     Years: 5.00     Pack years: 5.00     Last attempt to quit: 1970     Years since quittin.7    Smokeless tobacco: Never Used   Substance and Sexual Activity    Alcohol use: Yes     Comment: \"VERY LITTLE ALCOHOL\"    Drug use: No   Social History Narrative            3 children women 36, 40, 40 healthy other than skin issues     Family History   Problem Relation Age of Onset    Heart Disease Mother     Heart Surgery Mother         VALVE REPLACED AT AGE 72    Heart Attack Father 46        MASSIVE     Anesth Problems Neg Hx      Current Outpatient Medications   Medication Sig Dispense Refill    losartan (COZAAR) 25 mg tablet Take  by mouth daily. Takes along with losartan-hctz 50-12.5mg      rosuvastatin (CRESTOR) 20 mg tablet Take 1 Tab by mouth nightly for 360 days. 30 Tab 11    metoprolol tartrate (LOPRESSOR) 50 mg tablet Take 1 Tab by mouth two (2) times a day for 360 days. 90 Tab 3    levothyroxine (SYNTHROID) 50 mcg tablet TAKE 1 TABLET BY MOUTH EVERY DAY IN THE MORNING 20 Tab 0    losartan-hydroCHLOROthiazide (HYZAAR) 50-12.5 mg per tablet Take 1 Tab by mouth two (2) times a day. (Patient taking differently: Take 1 Tab by mouth two (2) times a day. Takes along with losartan 25 mg) 180 Tab 0    sertraline (ZOLOFT) 100 mg tablet TAKE 1 TABLET BY MOUTH EVERY DAY IN THE EVENING 90 Tab 3    ACCU-CHEK COMPACT PLUS TEST strp USE TO TEST BLOOD SUGAR 4 TIMES DAILY. DX E11.9 357 Strip 3    magnesium oxide (MAG-OX) 400 mg tablet TAKE 1 TAB BY MOUTH TWO (2) TIMES A DAY. 240 Tab 1    Blood Sugar Diagnostic, Drum (ACCU-CHEK COMPACT PLUS TEST) strp USE TO TEST BLOOD SUGAR 4 TIMES DAILY. DX E11.9 357 Strip 0    metFORMIN ER (GLUCOPHAGE XR) 500 mg tablet TAKE 4 TABLETS BY MOUTH EVERY DAY  3    insulin glargine (LANTUS) 100 unit/mL injection 20 Units by SubCUTAneous route two (2) times a day. (Patient taking differently: 46 Units by SubCUTAneous route two (2) times a day.  50 units once daily) 1 Vial 3    HUMALOG KWIKPEN 100 unit/mL kwikpen Sliding scale tid  3    Cholecalciferol, Vitamin D3, (VITAMIN D3) 1,000 unit cap Take 1 Cap by mouth nightly. 30 Cap 3    Aspirin, Buffered 81 mg tab Take  by mouth daily.  omega-3 acid ethyl esters (LOVAZA) 1 gram capsule Take 2 g by mouth two (2) times a day. Allergies   Allergen Reactions    Adhesive Rash and Itching    Advil [Ibuprofen] Swelling     FEET SWELL    Ceftin [Cefuroxime Axetil] Itching    Lotensin [Benazepril] Cough    Penicillins Swelling    Sulfa (Sulfonamide Antibiotics) Rash, Itching and Swelling       Objective:  Visit Vitals  /70 (BP 1 Location: Left arm, BP Patient Position: Sitting)   Pulse (!) 56   Temp 97.9 °F (36.6 °C) (Oral)   Resp 16   Ht 5' 2\" (1.575 m)   Wt 205 lb (93 kg)   SpO2 97%   BMI 37.49 kg/m²     Physical Exam:   General appearance - alert, well appearing, and in no distress  Mental status - alert, oriented to person, place, and time  EYE-SHARAD, EOMI, corneas normal, no foreign bodies, visual acuity normal both eyes, no periorbital cellulitis  ENT-ENT exam normal, no neck nodes or sinus tenderness  Nose - normal and patent, no erythema, discharge or polyps  Mouth - mucous membranes moist, pharynx normal without lesions  Neck - supple, no significant adenopathy   Chest - clear to auscultation, no wheezes, rales or rhonchi, symmetric air entry   Heart - normal rate, regular rhythm, normal S1, S2, no murmurs, rubs, clicks or gallops   Abdomen - soft, nontender, nondistended, no masses or organomegaly, obese, no rebound  Lymph- no adenopathy palpable  Ext-peripheral pulses normal, no pedal edema, no clubbing or cyanosis, second toe on right healing, hematoma at base of phalanges  Skin-Warm and dry.  no hyperpigmentation, vitiligo, or suspicious lesions, scattered resolved superifical skin bites, no erythema  Neuro -alert, oriented, normal speech, no focal findings or movement disorder noted  Neck-normal C-spine, no tenderness, full ROM without pain      Results for orders placed or performed in visit on 08/16/19   CBC WITH AUTOMATED DIFF   Result Value Ref Range    WBC 11.9 (H) 3.4 - 10.8 x10E3/uL    RBC 4.25 3.77 - 5.28 x10E6/uL    HGB 13.9 11.1 - 15.9 g/dL    HCT 39.7 34.0 - 46.6 %    MCV 93 79 - 97 fL    MCH 32.7 26.6 - 33.0 pg    MCHC 35.0 31.5 - 35.7 g/dL    RDW 14.0 12.3 - 15.4 %    PLATELET 727 758 - 836 x10E3/uL    NEUTROPHILS 71 Not Estab. %    Lymphocytes 19 Not Estab. %    MONOCYTES 7 Not Estab. %    EOSINOPHILS 3 Not Estab. %    BASOPHILS 0 Not Estab. %    ABS. NEUTROPHILS 8.6 (H) 1.4 - 7.0 x10E3/uL    Abs Lymphocytes 2.3 0.7 - 3.1 x10E3/uL    ABS. MONOCYTES 0.8 0.1 - 0.9 x10E3/uL    ABS. EOSINOPHILS 0.3 0.0 - 0.4 x10E3/uL    ABS. BASOPHILS 0.0 0.0 - 0.2 x10E3/uL    IMMATURE GRANULOCYTES 0 Not Estab. %    ABS. IMM. GRANS. 0.0 0.0 - 0.1 [id number]   METABOLIC PANEL, COMPREHENSIVE   Result Value Ref Range    Glucose 166 (H) 65 - 99 mg/dL    BUN 15 8 - 27 mg/dL    Creatinine 1.11 (H) 0.57 - 1.00 mg/dL    GFR est non-AA 49 (L) >59 mL/min/1.73    GFR est AA 57 (L) >59 mL/min/1.73    BUN/Creatinine ratio 14 12 - 28    Sodium 139 134 - 144 mmol/L    Potassium 4.1 3.5 - 5.2 mmol/L    Chloride 100 96 - 106 mmol/L    CO2 22 20 - 29 mmol/L    Calcium 10.5 (H) 8.7 - 10.3 mg/dL    Protein, total 6.9 6.0 - 8.5 g/dL    Albumin 4.5 3.5 - 4.8 g/dL    GLOBULIN, TOTAL 2.4 1.5 - 4.5 g/dL    A-G Ratio 1.9 1.2 - 2.2    Bilirubin, total 0.6 0.0 - 1.2 mg/dL    Alk. phosphatase 87 39 - 117 IU/L    AST (SGOT) 34 0 - 40 IU/L    ALT (SGPT) 36 (H) 0 - 32 IU/L   CKD REPORT   Result Value Ref Range    Interpretation Note      Diagnoses and all orders for this visit:    1. Mixed hyperlipidemia  Continue Crestor  Lipid panel tomorrow will be a reflection of atorvastatin 20 mg as the Crestor was a recent change. -     LIPID PANEL    2. B12 deficiency  Patient is currently taking B12 500 mcg/day. We will check her B12 level and check her MMA. If the latter is elevated she may need injections. -     VITAMIN B12  -     METHYLMALONIC ACID    3.  Essential hypertension  Blood pressure overall well controlled but did have some 150/80 readings since the last visit. Patient had an increase in her metoprolol by her cardiologist.  I would like to transition her losartan to Benicar at her next visit if her blood pressures still have episodic readings of in the 150 range. These episodic increases  may be due to sleep related issues. 4. Controlled type 2 diabetes mellitus with microalbuminuria, with long-term current use of insulin ([facility] [address]  Continue meds  Patient has significant proteinuria and potential transition to 40 mg Benicar to maximize her arb dose. Patient will follow-up with me in 3 months or earlier if needed. I did discuss with her that if she should have any chest pain that she needs to go to the emergency room based on her cardiovascular profile. She currently does not have any cardiac symptoms.

## 2019-09-06 ENCOUNTER — HOSPITAL ENCOUNTER (OUTPATIENT)
Dept: LAB | Age: 74
Discharge: HOME | End: 2019-09-06
Payer: MEDICARE

## 2019-09-06 ENCOUNTER — PATIENT MESSAGE (OUTPATIENT)
Dept: INTERNAL MEDICINE CLINIC | Age: 74
End: 2019-09-06

## 2019-09-06 PROCEDURE — 82607 VITAMIN B-12: CPT

## 2019-09-06 PROCEDURE — 80061 LIPID PANEL: CPT

## 2019-09-06 PROCEDURE — 36415 COLL VENOUS BLD VENIPUNCTURE: CPT

## 2019-09-06 PROCEDURE — 83921 ORGANIC ACID SINGLE QUANT: CPT

## 2019-09-11 ENCOUNTER — PATIENT MESSAGE (OUTPATIENT)
Dept: CARDIOLOGY CLINIC | Age: 74
End: 2019-09-11

## 2019-09-11 ENCOUNTER — TELEPHONE (OUTPATIENT)
Dept: CARDIOLOGY CLINIC | Age: 74
End: 2019-09-11

## 2019-09-11 DIAGNOSIS — R06.09 DOE (DYSPNEA ON EXERTION): ICD-10-CM

## 2019-09-11 DIAGNOSIS — I25.10 CORONARY ARTERY DISEASE DUE TO LIPID RICH PLAQUE: Primary | ICD-10-CM

## 2019-09-11 DIAGNOSIS — I25.83 CORONARY ARTERY DISEASE DUE TO LIPID RICH PLAQUE: Primary | ICD-10-CM

## 2019-09-11 LAB
CHOLEST SERPL-MCNC: 135 MG/DL (ref 100–199)
HDLC SERPL-MCNC: 51 MG/DL
INTERPRETATION, 910389: NORMAL
LDLC SERPL CALC-MCNC: 43 MG/DL (ref 0–99)
Lab: NORMAL
METHYLMALONATE SERPL-SCNC: 157 NMOL/L (ref 0–378)
TRIGL SERPL-MCNC: 207 MG/DL (ref 0–149)
VIT B12 SERPL-MCNC: 497 PG/ML (ref 232–1245)
VLDLC SERPL CALC-MCNC: 41 MG/DL (ref 5–40)

## 2019-09-11 NOTE — TELEPHONE ENCOUNTER
----- Message from Jett Olson MD sent at 9/11/2019  1:44 PM EDT -----  Coronary artery disease and dyspnea on exertion.   ----- Message -----  From: Maria Valdovinos RN  Sent: 9/11/2019   1:30 PM EDT  To: Jett Olson MD    Dx code for Echo.   ----- Message -----  From: Beka Khanna MD  Sent: 9/11/2019  12:16 PM EDT  To: Yue Ryan RN    I dont recall if we ordered. she will need echo along or prior to her next visit.     thanks

## 2019-09-11 NOTE — PROGRESS NOTES
[place]: Tyra Sutton  [phone]          Cardiology Consult/Progress Note      Requesting/referring provider:   Reason for Consult:    HPI: Waqas Gonzalez, a 76y.o. year-old who presents for evaluation of elevated calcium score. Mrs. Christie Kwok has history of hypertension diabetes and coronary artery disease with remote angioplasty about 20 years ago. She has subsequently not been cathed. Currently she denies any symptoms of chest pain but is easily fatigued and often short of breath with activity. Her day-to-day activity level is pretty low. Recently she underwent a coronary calcium/Agatson score which was significantly elevated close to about 2000. She is here to discuss the results. Importantly about 2 weeks ago patient was in [place] and had 2 episodes of chest pain for which she had to visit hospital ER. Troponins were negative at that time and they recommended further follow-up with cardiology. ECG: August 2019 sinus rhythm nonspecific ST-T changes    Assessment/Plan:  1. Coronary artery disease manifested in the form of remote ACS status post angioplasty. 2.  Functional intolerance/exercise intolerance potentially related to advanced coronary artery disease  3. Hypertension essential  4. Morbid obesity with sleep apnea  5. Type 2 diabetes mellitus    Mrs. Christie Kwok was seen with her  for discussion regarding recently identified elevated coronary calcium score. I discussed the significance of this result and told her that independent of elevated calcium score being a diabetic and with previous diagnosis of coronary artery disease she has independent factors supporting a diagnosis of advanced coronary artery disease. Her poor functional tolerance suggests possibility of multi-vessel coronary artery disease. We could identify it further by performing a cardiac catheterization.   However patient is not inclined to do that at this point of time.  Alternatively we could perform a stress test but with possibility of multivessel disease stress test could underestimate the degree of ischemia. Moreover if the stress test is positive she will require to get a cardiac catheterization which she is not interested in at this point of time. She has slight mental slowness and  prefers against invasive strategy as first line which is reasonable. Hence I have told the patient that regardless we should treat her as patient with advanced atherosclerotic vascular disease and coronary artery disease and should try to optimize her risk factors as much as we can. We would like to perform an echocardiogram to rule out significant LV systolic or diastolic dysfunction or valvular heart disease given that she has shortness of breath on mild to moderate activity. In the interim I also recommend her to modify her diet and gradually try to increase the level of her activity to improve her functional tolerance. Her statin therapy would be optimized. Blood pressure is well controlled. Since she has not had any recurrence of chest pain in the past 3 weeks it is reasonable not to initiate her on Plavix right now. I plan to see her back in 3-6 months to assess if she has any recurrent chest discomfort and decide whether reiteration for cardiac catheterization is needed. Continue metoprolol for antianginal benefit and losartan for antihypertensive benefit. Will change simvastatin to crestor 20 mg daily. [x]    High complexity decision making was performed  []    Patient is at high-risk of decompensation with multiple organ involvement  She  has a past medical history of Arthritis, CAD (coronary artery disease) (2006), Deviated septum (12/20/13), Diabetes ([facility] [address] Heart disease, Hypertension, Incontinence, Nasal polyp, and Unspecified sleep apnea (12/20/2013). Review of system:Patient reports no PND/Orthpnea/CP. Reports no pedal edema.   Over the past few years her weight has significantly increased. Diabetes control is marginal at best.  He reports no cough/fever/focal neurological deficits/abdominal pain. All other systems negative except as above. Family History   Problem Relation Age of Onset    Heart Disease Mother     Heart Surgery Mother         VALVE REPLACED AT AGE 72    Heart Attack Father 46        MASSIVE     Anesth Problems Neg Hx       Social History     Socioeconomic History    Marital status:      Spouse name: Not on file    Number of children: Not on file    Years of education: Not on file    Highest education level: Not on file   Tobacco Use    Smoking status: Former Smoker     Packs/day: 1.00     Years: 5.00     Pack years: 5.00     Last attempt to quit: 1970     Years since quittin.7    Smokeless tobacco: Never Used   Substance and Sexual Activity    Alcohol use: Yes     Comment: \"VERY LITTLE ALCOHOL\"    Drug use: No   Social History Narrative            3 children women 40, 40, 37 healthy other than skin issues      PE  Vitals:    19 1343 19 1357   BP: 138/82 130/71   Pulse: 62    SpO2: 96%    Weight: 204 lb 9.6 oz (92.8 kg)    Height: 5' 2\" (1.575 m)     Body mass index is 37.42 kg/m². General:    Alert, cooperative, no distress. Morbidly obese   Psychiatric:    Normal Mood and affect    Eye/ENT:      Pupils equal, No asymmetry, Conjunctival slightly pale. Able to hear voice at normal amplitude   Lungs:      Visibly symmetric chest expansion, No palpable tenderness. Clear to auscultation bilaterally. Heart[de-identified]    Regular rate and rhythm, S1, S2 normal, no murmur, click, rub or gallop. No JVD, Normal palpable peripheral pulses. No cyanosis   Abdomen:     Soft, non-tender. Bowel sounds normal. No masses,  No      organomegaly. Extremities:   Extremities normal, atraumatic, no edema. Neurologic:   CN II-XII grossly intact.  No gross focal deficits           Recent Labs:  Lab Results   Component Value Date/Time    Cholesterol, total 135 2019 10:18 AM    HDL Cholesterol 51 2019 10:18 AM    LDL, calculated 43 2019 10:18 AM    Triglyceride 207 (H) 2019 10:18 AM     Lab Results   Component Value Date/Time    Creatinine 1.11 (H) 2019 08:57 AM     Lab Results   Component Value Date/Time    BUN 15 2019 08:57 AM     Lab Results   Component Value Date/Time    Potassium 4.1 2019 08:57 AM     Lab Results   Component Value Date/Time    Hemoglobin A1c 7.8 (H) 2016 09:25 AM    Hemoglobin A1c, External 7.7 2019     Lab Results   Component Value Date/Time    HGB 13.9 2019 08:57 AM     Lab Results   Component Value Date/Time    PLATELET 648  08:57 AM       Reviewed:  Past Medical History:   Diagnosis Date    Arthritis     CAD (coronary artery disease) 2006    STENT PLACED>PLAQUE BROKE OFF--HAD \"MINOR MI\"    Deviated septum 13    HAS TROUBLE BREATHING THROUGH LEFT SIDE; CT SHOWED POLYP    Diabetes ([address]     Heart disease     Hypertension     Incontinence     Nasal polyp     Unspecified sleep apnea 2013    cpap     Social History     Tobacco Use   Smoking Status Former Smoker    Packs/day: 1.00    Years: 5.00    Pack years: 5.00    Last attempt to quit: 1970    Years since quittin.7   Smokeless Tobacco Never Used     Social History     Substance and Sexual Activity   Alcohol Use Yes    Comment: \"VERY LITTLE ALCOHOL\"     Allergies   Allergen Reactions    Adhesive Rash and Itching    Advil [Ibuprofen] Swelling     FEET SWELL    Ceftin [Cefuroxime Axetil] Itching    Lotensin [Benazepril] Cough    Penicillins Swelling    Sulfa (Sulfonamide Antibiotics) Rash, Itching and Swelling     Family History   Problem Relation Age of Onset    Heart Disease Mother     Heart Surgery Mother         VALVE REPLACED AT AGE 72    Heart Attack Father 46        MASSIVE     Anesth Problems Neg Hx         Current Outpatient Medications Medication Sig    losartan (COZAAR) 25 mg tablet Take  by mouth daily. Takes along with losartan-hctz 50-12.5mg    rosuvastatin (CRESTOR) 20 mg tablet Take 1 Tab by mouth nightly for 360 days.  metoprolol tartrate (LOPRESSOR) 50 mg tablet Take 1 Tab by mouth two (2) times a day for 360 days.  levothyroxine (SYNTHROID) 50 mcg tablet TAKE 1 TABLET BY MOUTH EVERY DAY IN THE MORNING    losartan-hydroCHLOROthiazide (HYZAAR) 50-12.5 mg per tablet Take 1 Tab by mouth two (2) times a day. (Patient taking differently: Take 1 Tab by mouth two (2) times a day. Takes along with losartan 25 mg)    sertraline (ZOLOFT) 100 mg tablet TAKE 1 TABLET BY MOUTH EVERY DAY IN THE EVENING    ACCU-CHEK COMPACT PLUS TEST strp USE TO TEST BLOOD SUGAR 4 TIMES DAILY. DX E11.9    magnesium oxide (MAG-OX) 400 mg tablet TAKE 1 TAB BY MOUTH TWO (2) TIMES A DAY.  Blood Sugar Diagnostic, Drum (ACCU-CHEK COMPACT PLUS TEST) strp USE TO TEST BLOOD SUGAR 4 TIMES DAILY. DX E11.9    metFORMIN ER (GLUCOPHAGE XR) 500 mg tablet TAKE 4 TABLETS BY MOUTH EVERY DAY    insulin glargine (LANTUS) 100 unit/mL injection 20 Units by SubCUTAneous route two (2) times a day. (Patient taking differently: 46 Units by SubCUTAneous route two (2) times a day. 50 units once daily)    HUMALOG KWIKPEN 100 unit/mL kwikpen Sliding scale tid    Cholecalciferol, Vitamin D3, (VITAMIN D3) 1,000 unit cap Take 1 Cap by mouth nightly.  Aspirin, Buffered 81 mg tab Take  by mouth daily.  omega-3 acid ethyl esters (LOVAZA) 1 gram capsule Take 2 g by mouth two (2) times a day. No current facility-administered medications for this visit. Faith Fried MD09/11/19 There are other unrelated non-urgent complaints, but due to the busy schedule and the amount of time I've already spent with her, time does not permit me to address these routine issues at today's visit. I've requested another appointment to review these additional issues.     [facility] Galleg[facility] [place]  [facility] [address], [address], [address]

## 2019-09-21 DIAGNOSIS — E03.9 ACQUIRED HYPOTHYROIDISM: ICD-10-CM

## 2019-09-21 RX ORDER — LEVOTHYROXINE SODIUM 50 UG/1
TABLET ORAL
Qty: 20 TAB | Refills: 0 | Status: SHIPPED | OUTPATIENT
Start: 2019-09-21 | End: 2019-09-25

## 2019-09-25 DIAGNOSIS — E03.9 ACQUIRED HYPOTHYROIDISM: ICD-10-CM

## 2019-09-25 RX ORDER — LEVOTHYROXINE SODIUM 50 UG/1
TABLET ORAL
Qty: 90 TAB | Refills: 1 | Status: SHIPPED | OUTPATIENT
Start: 2019-09-25 | End: 2020-04-12

## 2019-11-17 DIAGNOSIS — I10 ESSENTIAL HYPERTENSION: ICD-10-CM

## 2019-11-18 RX ORDER — LOSARTAN POTASSIUM AND HYDROCHLOROTHIAZIDE 12.5; 5 MG/1; MG/1
1 TABLET ORAL 2 TIMES DAILY
Qty: 180 TAB | Refills: 0 | Status: SHIPPED | OUTPATIENT
Start: 2019-11-18 | End: 2020-01-12

## 2019-12-12 ENCOUNTER — HOSPITAL ENCOUNTER (OUTPATIENT)
Dept: LAB | Age: 74
Discharge: HOME | End: 2019-12-12

## 2019-12-12 ENCOUNTER — OFFICE VISIT (OUTPATIENT)
Dept: INTERNAL MEDICINE CLINIC | Age: 74
End: 2019-12-12

## 2019-12-12 VITALS
RESPIRATION RATE: 14 BRPM | OXYGEN SATURATION: 98 % | WEIGHT: 205 LBS | HEIGHT: 62 IN | HEART RATE: 67 BPM | DIASTOLIC BLOOD PRESSURE: 74 MMHG | SYSTOLIC BLOOD PRESSURE: 147 MMHG | BODY MASS INDEX: 37.73 KG/M2 | TEMPERATURE: 97.6 F

## 2019-12-12 DIAGNOSIS — E66.9 OBESITY, CLASS II, BMI 35-39.9: ICD-10-CM

## 2019-12-12 DIAGNOSIS — Z79.4 CONTROLLED TYPE 2 DIABETES MELLITUS WITH MICROALBUMINURIA, WITH LONG-TERM CURRENT USE OF INSULIN (HCC): ICD-10-CM

## 2019-12-12 DIAGNOSIS — G47.00 INSOMNIA, UNSPECIFIED TYPE: ICD-10-CM

## 2019-12-12 DIAGNOSIS — E11.29 CONTROLLED TYPE 2 DIABETES MELLITUS WITH MICROALBUMINURIA, WITH LONG-TERM CURRENT USE OF INSULIN (HCC): ICD-10-CM

## 2019-12-12 DIAGNOSIS — I10 ESSENTIAL HYPERTENSION: ICD-10-CM

## 2019-12-12 DIAGNOSIS — I10 ESSENTIAL HYPERTENSION: Primary | ICD-10-CM

## 2019-12-12 DIAGNOSIS — R80.9 CONTROLLED TYPE 2 DIABETES MELLITUS WITH MICROALBUMINURIA, WITH LONG-TERM CURRENT USE OF INSULIN (HCC): ICD-10-CM

## 2019-12-12 LAB
ALBUMIN SERPL-MCNC: 3.8 G/DL (ref 3.5–5)
ALBUMIN/GLOB SERPL: 1.1 {RATIO} (ref 1.1–2.2)
ALP SERPL-CCNC: 107 U/L (ref 45–117)
ALT SERPL-CCNC: 39 U/L (ref 12–78)
ANION GAP SERPL CALC-SCNC: 8 MMOL/L (ref 5–15)
AST SERPL-CCNC: 26 U/L (ref 15–37)
BILIRUB SERPL-MCNC: 0.5 MG/DL (ref 0.2–1)
BUN SERPL-MCNC: 22 MG/DL (ref 6–20)
BUN/CREAT SERPL: 23 (ref 12–20)
CALCIUM SERPL-MCNC: 9.6 MG/DL (ref 8.5–10.1)
CHLORIDE SERPL-SCNC: 105 MMOL/L (ref 97–108)
CO2 SERPL-SCNC: 27 MMOL/L (ref 21–32)
CREAT SERPL-MCNC: 0.97 MG/DL (ref 0.55–1.02)
GLOBULIN SER CALC-MCNC: 3.5 G/DL (ref 2–4)
GLUCOSE SERPL-MCNC: 204 MG/DL (ref 65–100)
POTASSIUM SERPL-SCNC: 4 MMOL/L (ref 3.5–5.1)
PROT SERPL-MCNC: 7.3 G/DL (ref 6.4–8.2)
SODIUM SERPL-SCNC: 140 MMOL/L (ref 136–145)

## 2019-12-12 RX ORDER — BUSPIRONE HYDROCHLORIDE 5 MG/1
5 TABLET ORAL
Qty: 30 TAB | Refills: 1 | Status: SHIPPED | OUTPATIENT
Start: 2019-12-12 | End: 2020-02-25

## 2019-12-12 NOTE — PROGRESS NOTES
Patient aware of negative urine culture results and instructions from [facility], [facility]. Verbalized understanding. See TE 4/12 for additional test results including A1C. Zoila Moran is a 76 y.o. female and presents with Hypertension; Vitamin B12 Deficiency; Cholesterol Problem; and Diabetes  . Patient presents with her  Preethi Strange. Since last visit she has been seen by Dr. Melida Babinski. Diabetes   This patient was seen yesterday by Dr. Melida Babinski, bp good but her diabetes was not optimally controlled.  reports that patient was not compliant with taking her insulin in the evening. He reports that she would sometimes get her evening dose because he would go to bed before she took her medication. Patient reports that she had labs done prior to her appointment with Dr. Melida Babinski. She is not certain which labs she had drawn. She is using her continuous glucose monitor but sometimes has difficulty entering her sugar in the spenser. She denies having any hypoglycemic episodes. Patient reports that Dr. Melida Babinski is checking her feet for breakdown and nerve issues. She will have another appointment with her in 3 month. Dr. Mayelin Bland + cataract, no diabetic retinopathy, Dec or Jan (opthal)  Podiatry is seen annually also  becaseu of ingrown toenail. Patient's last hemoglobin A1c was 7.7 this month in August.     Insomnia   reports that patient goes to bed after he does. He goes to bed at 11 PM and notes that she goes to bed after him. He reports that sometimes he will see her up in the den on her computer at 4:00 in the morning. She has been educated about not being on her computer or screen but continues to do so.  also notes that patient is sleeping during the daytime. Think she is getting 6 hours including the day  She has cpap. Checks in once a year with Dr. Holden Lorenzo and will have a appointment with her in January. Subjective:   Zoila Moran is a 76 y.o. female with hypertension.   Hypertension ROS: taking medications as instructed, no medication side effects noted, no TIA's, no chest pain on exertion, no dyspnea on exertion, no swelling of ankles. New concerns she is tolerating her losartan 50/12.5 x2. She denies lightheaded or dizziness. She denies edema. She is using her CPAP machine.  querying about her kidney function and urine protein. Discussed with  that she is on the maximum dose of angiotensin receptor blocker and likely diabetes along with her blood pressure is causing the protein in her urine. Her blood pressure at home is closer to the 140/80 range.  Jovita  has been checking patient's blood pressure at home. Her blood pressure for the most part has been in the 130/70 range. She has some episodic periods where her blood pressure is in the 150s. Memory issues  Discussed that patient does not comply with endocrine's recommendations for insulin and lack of compliance regarding screen time.  reports that patient is aware but does not do. CAD  September cholesterol reviewed with patient and . This reflects the simvastatin but not Crestor yet. Patient has transitioned her cardiac care to Dr. Mavis Varner. He has switched her simvastatin to Crestor and increased her metoprolol. She has been compliant with these changes. She denies chest pain or shortness of breath. She has a follow-up with him in 6 months. She was having some epigastric pain. She has a follow-up with GI in 4 days. Dr. Patrick Briceno recommended more medical management. She will look into weight watchers and a CAC. Review of Systems  Constitutional: negative for fevers, chills, anorexia and weight loss  Eyes:   negative for visual disturbance and irritation  ENT:   negative for tinnitus,sore throat,nasal congestion,ear pains. hoarseness  Respiratory:  negative for cough, hemoptysis, dyspnea,wheezing  CV:   negative for chest pain, palpitations, lower extremity edema  GI:   negative for nausea, vomiting, diarrhea, abdominal pain,melena  Endo:               negative for polyuria,polydipsia,polyphagia,heat intolerance  Genitourinary: negative for frequency, dysuria and hematuria  Integument:  negative for rash and pruritus  Hematologic:  negative for easy bruising and gum/nose bleeding  Musculoskel: negative for myalgias, arthralgias, back pain, muscle weakness, joint pain  Neurological:  negative for headaches, dizziness, vertigo, memory problems and gait   Behavl/Psych: negative for feelings of anxiety, depression, mood changes    Past Medical History:   Diagnosis Date    Arthritis     CAD (coronary artery disease) 2006    STENT PLACED>PLAQUE BROKE OFF--HAD \"MINOR MI\"    Deviated septum 13    HAS TROUBLE BREATHING THROUGH LEFT SIDE; CT SHOWED POLYP    Diabetes ([facility] [address]     Heart disease     Hypertension     Incontinence     Nasal polyp     Unspecified sleep apnea 2013    cpap     Past Surgical History:   Procedure Laterality Date    CARDIAC SURG PROCEDURE UNLIST      CARDIAC CATH;ANGIOPLASTY WITH STENT    HX  SECTION      X3    HX COLONOSCOPY      HX HEENT  ?     SEPTOPLASTY     Social History     Socioeconomic History    Marital status:      Spouse name: Not on file    Number of children: Not on file    Years of education: Not on file    Highest education level: Not on file   Tobacco Use    Smoking status: Former Smoker     Packs/day: 1.00     Years: 5.00     Pack years: 5.00     Last attempt to quit: 1970     Years since quittin.0    Smokeless tobacco: Never Used   Substance and Sexual Activity    Alcohol use: Yes     Comment: \"VERY LITTLE ALCOHOL\"    Drug use: No   Social History Narrative            3 children women 36, 40, 40 healthy other than skin issues     Family History   Problem Relation Age of Onset    Heart Disease Mother     Heart Surgery Mother         VALVE REPLACED AT AGE 72    Heart Attack Father 46        MASSIVE     Anesth Problems Neg Hx      Current Outpatient Medications   Medication Sig Dispense Refill    losartan-hydroCHLOROthiazide (HYZAAR) 50-12.5 mg per tablet Take 1 Tab by mouth two (2) times a day. Takes along with losartan 25 mg 180 Tab 0    levothyroxine (SYNTHROID) 50 mcg tablet Take 1 po daily 90 Tab 1    losartan (COZAAR) 25 mg tablet Take  by mouth daily. Takes along with losartan-hctz 50-12.5mg      rosuvastatin (CRESTOR) 20 mg tablet Take 1 Tab by mouth nightly for 360 days. 30 Tab 11    metoprolol tartrate (LOPRESSOR) 50 mg tablet Take 1 Tab by mouth two (2) times a day for 360 days. 90 Tab 3    sertraline (ZOLOFT) 100 mg tablet TAKE 1 TABLET BY MOUTH EVERY DAY IN THE EVENING 90 Tab 3    ACCU-CHEK COMPACT PLUS TEST strp USE TO TEST BLOOD SUGAR 4 TIMES DAILY. DX E11.9 357 Strip 3    magnesium oxide (MAG-OX) 400 mg tablet TAKE 1 TAB BY MOUTH TWO (2) TIMES A DAY. 240 Tab 1    Blood Sugar Diagnostic, Drum (ACCU-CHEK COMPACT PLUS TEST) strp USE TO TEST BLOOD SUGAR 4 TIMES DAILY. DX E11.9 357 Strip 0    metFORMIN ER (GLUCOPHAGE XR) 500 mg tablet TAKE 4 TABLETS BY MOUTH EVERY DAY  3    insulin glargine (LANTUS) 100 unit/mL injection 20 Units by SubCUTAneous route two (2) times a day. (Patient taking differently: 46 Units by SubCUTAneous route two (2) times a day. 50 units once daily) 1 Vial 3    HUMALOG KWIKPEN 100 unit/mL kwikpen Sliding scale tid  3    Cholecalciferol, Vitamin D3, (VITAMIN D3) 1,000 unit cap Take 1 Cap by mouth nightly. 30 Cap 3    Aspirin, Buffered 81 mg tab Take  by mouth daily.  omega-3 acid ethyl esters (LOVAZA) 1 gram capsule Take 2 g by mouth two (2) times a day.        Allergies   Allergen Reactions    Adhesive Rash and Itching    Advil [Ibuprofen] Swelling     FEET SWELL    Ceftin [Cefuroxime Axetil] Itching    Lotensin [Benazepril] Cough    Penicillins Swelling    Sulfa (Sulfonamide Antibiotics) Rash, Itching and Swelling       Objective:  Visit Vitals  /74 (BP 1 Location: Left arm, BP Patient Position: Sitting)   Pulse 67   Temp 97.6 °F (36.4 °C) (Oral)   Resp 14   Ht 5' 2\" (1.575 m)   Wt 205 lb (93 kg)   SpO2 98%   BMI 37.49 kg/m²     Physical Exam:   General appearance - alert, well appearing, and in no distress  Mental status - alert, oriented to person, place, and time  EYE-SHARAD, EOMI, corneas normal, no foreign bodies, visual acuity normal both eyes, no periorbital cellulitis  ENT-ENT exam normal, no neck nodes or sinus tenderness  Nose - normal and patent, no erythema, discharge or polyps  Mouth - mucous membranes moist, pharynx normal without lesions  Neck - supple, no significant adenopathy   Chest - clear to auscultation, no wheezes, rales or rhonchi, symmetric air entry   Heart - normal rate, regular rhythm, normal S1, S2, no murmurs, rubs, clicks or gallops   Abdomen - soft, nontender, nondistended, no masses or organomegaly, obese, no rebound  Lymph- no adenopathy palpable  Ext-peripheral pulses normal, no pedal edema, no clubbing or cyanosis, second toe on right healing, hematoma at base of phalanges  Skin-Warm and dry. no hyperpigmentation, vitiligo, or suspicious lesions, scattered resolved superifical skin bites, no erythema  Neuro -alert, oriented, normal speech, no focal findings or movement disorder noted  Neck-normal C-spine, no tenderness, full ROM without pain      Results for orders placed or performed in visit on 09/05/19   VITAMIN B12   Result Value Ref Range    Vitamin B12 497 232 - 1,245 pg/mL   METHYLMALONIC ACID   Result Value Ref Range    METHYLMALONIC ACID, SERUM 157 0 - 378 nmol/L    Disclaimer Comment    LIPID PANEL   Result Value Ref Range    Cholesterol, total 135 100 - 199 mg/dL    Triglyceride 207 (H) 0 - 149 mg/dL    HDL Cholesterol 51 >39 mg/dL    VLDL, calculated 41 (H) 5 - 40 mg/dL    LDL, calculated 43 0 - 99 mg/dL   CVD REPORT   Result Value Ref Range    INTERPRETATION Note      Diagnoses and all orders for this visit:    1. Essential hypertension  On recheck patient's blood pressure is 140/68.   She will continue on  Losartan 20/12.5, 2 tabs daily  We will continue to monitor  If her blood pressure is not optimally controlled and her kidney function declines we will send her to nephrology. We discussed not getting patient's CMP today because not sure if Dr. Eun Barth ordered last week.  requesting CMP be done even if it was done by Dr. Eun Barth regardless of insurance coverage. If kidney function decreases further will refer to nephrology.  -     METABOLIC PANEL, COMPREHENSIVE; Future    2. Obesity, Class II, BMI 35-39.9  Discussed sleep issues contributing to obesity. I discussed potential use of Ozempic. She may ask endocrinology if she is a candidate. 3. Controlled type 2 diabetes mellitus with microalbuminuria, with long-term current use of insulin ([facility])  Behavioral changes discussed and patient will take her insulin in the evening. I discussed with her potential use of Ozempic which can cause some weight loss and diabetes control. They will see if endocrinology wants to add on.    4. Insomnia, unspecified type  Patient reports irregular sleep habits and will need to do behavioral changes. Empathetic listening provided but also discussed augmenting her SSRI with BuSpar to possibly help with some anxiety in the evening. She can increase to 10 mg if no side effects.  -     busPIRone (BUSPAR) 5 mg tablet; Take 1 Tab by mouth nightly. 1 hour prior to bed      Patient will follow-up with me in 3 months. She will ask Dr. Eun Barth of she is a candidate for Ozempic. She will monitor her blood pressure. If her kidney function is decreasing I will go ahead and send her to nephrology.

## 2019-12-26 ENCOUNTER — PATIENT MESSAGE (OUTPATIENT)
Dept: INTERNAL MEDICINE CLINIC | Age: 74
End: 2019-12-26

## 2019-12-27 NOTE — TELEPHONE ENCOUNTER
From: Trey Sands  To: Thi Saeed MD  Sent: 12/26/2019 1:27 PM EST  Subject: Update Medical Information    Delete Losatan 25 mg from medication list  Add B12 1000 mcg to medication list

## 2020-01-08 ENCOUNTER — DOCUMENTATION ONLY (OUTPATIENT)
Dept: SLEEP MEDICINE | Age: 75
End: 2020-01-08

## 2020-01-08 ENCOUNTER — OFFICE VISIT (OUTPATIENT)
Dept: SLEEP MEDICINE | Age: 75
End: 2020-01-08

## 2020-01-08 ENCOUNTER — HOSPITAL ENCOUNTER (OUTPATIENT)
Dept: CT IMAGING | Age: 75
Discharge: HOME | End: 2020-01-08
Attending: INTERNAL MEDICINE
Payer: MEDICARE

## 2020-01-08 VITALS
SYSTOLIC BLOOD PRESSURE: 95 MMHG | OXYGEN SATURATION: 100 % | WEIGHT: 202.7 LBS | HEIGHT: 62 IN | BODY MASS INDEX: 37.3 KG/M2 | DIASTOLIC BLOOD PRESSURE: 51 MMHG | HEART RATE: 69 BPM | TEMPERATURE: 97.5 F

## 2020-01-08 DIAGNOSIS — E11.21 TYPE 2 DIABETES WITH NEPHROPATHY (HCC): ICD-10-CM

## 2020-01-08 DIAGNOSIS — R91.8 PULMONARY NODULES: ICD-10-CM

## 2020-01-08 DIAGNOSIS — I10 ESSENTIAL HYPERTENSION WITH GOAL BLOOD PRESSURE LESS THAN 130/80: ICD-10-CM

## 2020-01-08 DIAGNOSIS — G47.33 OBSTRUCTIVE SLEEP APNEA (ADULT) (PEDIATRIC): Primary | ICD-10-CM

## 2020-01-08 PROCEDURE — 71250 CT THORAX DX C-: CPT

## 2020-01-08 NOTE — PROGRESS NOTES
Note reviewed and case discussed with NP  Agree with plan  Electronically signed by    Mala Man MD  Diplomate in Sleep Medicine  [facility]

## 2020-01-08 NOTE — PATIENT INSTRUCTIONS
[address] [facility] [place]. [place], [address]  Tel.  [phone]  Fax. [address] [facility] [address]  [place], [address] [facility]  Tel.  [phone]  Fax. [phone] [address]  Tel.  [phone]  Fax. [phone]     Learning About CPAP for Sleep Apnea  What is CPAP? CPAP is a small machine that you use at home every night while you sleep. It increases air pressure in your throat to keep your airway open. When you have sleep apnea, this can help you sleep better so you feel much better. CPAP stands for \"continuous positive airway pressure. \"  The CPAP machine will have one of the following:  · A mask that covers your nose and mouth  · Prongs that fit into your nose  · A mask that covers your nose only, the most common type. This type is called NCPAP. The N stands for \"nasal.\"  Why is it done? CPAP is usually the best treatment for obstructive sleep apnea. It is the first treatment choice and the most widely used. Your doctor may suggest CPAP if you have:  · Moderate to severe sleep apnea. · Sleep apnea and coronary artery disease (CAD) or heart failure. How does it help? · CPAP can help you have more normal sleep, so you feel less sleepy and more alert during the daytime. · CPAP may help keep heart failure or other heart problems from getting worse. · NCPAP may help lower your blood pressure. · If you use CPAP, your bed partner may also sleep better because you are not snoring or restless. What are the side effects? Some people who use CPAP have:  · A dry or stuffy nose and a sore throat. · Irritated skin on the face. · Sore eyes. · Bloating. If you have any of these problems, work with your doctor to fix them. Here are some things you can try:  · Be sure the mask or nasal prongs fit well. · See if your doctor can adjust the pressure of your CPAP. · If your nose is dry, try a humidifier.   · If your nose is runny or stuffy, try decongestant medicine or a steroid nasal spray. If these things do not help, you might try a different type of machine. Some machines have air pressure that adjusts on its own. Others have air pressures that are different when you breathe in than when you breathe out. This may reduce discomfort caused by too much pressure in your nose. Where can you learn more? Go to [employer]  Enter Mynor Eddy in the search box to learn more about \"Learning About CPAP for Sleep Apnea. \"   © 3613-3644 Healthwise, Incorporated. Care instructions adapted under license by [facility] [employer] (which disclaims liability or warranty for this information). This care instruction is for use with your licensed healthcare professional. If you have questions about a medical condition or this instruction, always ask your healthcare professional. [address] any warranty or liability for your use of this information. Content Version: 4.5.05477; Last Revised: January 11, 2010  PROPER SLEEP HYGIENE    What to avoid  · Do not have drinks with caffeine, such as coffee or black tea, for 8 hours before bed. · Do not smoke or use other types of tobacco near bedtime. Nicotine is a stimulant and can keep you awake. · Avoid drinking alcohol late in the evening, because it can cause you to wake in the middle of the night. · Do not eat a big meal close to bedtime. If you are hungry, eat a light snack. · Do not drink a lot of water close to bedtime, because the need to urinate may wake you up during the night. · Do not read or watch TV in bed. Use the bed only for sleeping and sexual activity. What to try  · Go to bed at the same time every night, and wake up at the same time every morning. Do not take naps during the day. · Keep your bedroom quiet, dark, and cool. · Get regular exercise, but not within 3 to 4 hours of your bedtime. .  · Sleep on a comfortable pillow and mattress.   · If watching the clock makes you anxious, turn it facing away from you so you cannot see the time. · If you worry when you lie down, start a worry book. Well before bedtime, write down your worries, and then set the book and your concerns aside. · Try meditation or other relaxation techniques before you go to bed. · If you cannot fall asleep, get up and go to another room until you feel sleepy. Do something relaxing. Repeat your bedtime routine before you go to bed again. · Make your house quiet and calm about an hour before bedtime. Turn down the lights, turn off the TV, log off the computer, and turn down the volume on music. This can help you relax after a busy day. Drowsy Driving: The [employer] cites drowsiness as a causing factor in more than 552,063 police reported crashes annually, resulting in 76,000 injuries and 1,500 deaths. Other surveys suggest 55% of people polled have driven while drowsy in the past year, 23% had fallen asleep but not crashed, 3% crashed, and 2% had and accident due to drowsy driving. Who is at risk? Young Drivers: One study of drowsy driving accidents states that 55% of the drivers were under 25 years. Of those, 75% were male. Shift Workers and Travelers: People who work overnight or travel across time zones frequently are at higher risk of experiencing Circadian Rhythm Disorders. They are trying to work and function when their body is programed to sleep. Sleep Deprived: Lack of sleep has a serious impact on your ability to pay attention or focus on a task. Consistently getting less than the average of 8 hours your body needs creates partial or cumulative sleep deprivation. Untreated Sleep Disorders: Sleep Apnea, Narcolepsy, R.L.S., and other sleep disorders (untreated) prevent a person from getting enough restful sleep. This leads to excessive daytime sleepiness and increases the risk for drowsy driving accidents by up to 7 times.   Medications / Alcohol: Even over the counter medications can cause drowsiness. Medications that impair a drivers attention should have a warning label. Alcohol naturally makes you sleepy and on its own can cause accidents. Combined with excessive drowsiness its effects are amplified. Signs of Drowsy Driving:   * You don't remember driving the last few miles   * You may drift out of your jerry   * You are unable to focus and your thoughts wander   * You may yawn more often than normal   * You have difficulty keeping your eyes open / nodding off   * Missing traffic signs, speeding, or tailgating  Prevention-   Good sleep hygiene, lifestyle and behavioral choices have the most impact on drowsy driving. There is no substitute for sleep and the average person requires 8 hours nightly. If you find yourself driving drowsy, stop and sleep. Consider the sleep hygiene tips provided during your visit as well. Medication Refill Policy: Refills for all medications require 1 week advance notice. Please have your pharmacy fax a refill request. We are unable to fax, or call in \"controled substance\" medications and you will need to pick these prescriptions up from our office. [employer] Activation    Thank you for requesting access to [facility]. Please follow the instructions below to securely access and download your online medical record. [employer] allows you to send messages to your doctor, view your test results, renew your prescriptions, schedule appointments, and more. How Do I Sign Up? 1. In your internet browser, go to [email or web address]. 2. Click on the First Time User? Click Here link in the Sign In box. You will see the New Member Sign Up page. 3. Enter your [employer] Access Code exactly as it appears below. You will not need to use this code after youve completed the sign-up process. If you do not sign up before the expiration date, you must request a new code. [employer] Access Code:  Activation code not generated  Current [employer] Status: Active (This is the date your [employer] access code will )    4. Enter the last four digits of your Social Security Number (xxxx) and Date of Birth (mm/dd/yyyy) as indicated and click Submit. You will be taken to the next sign-up page. 5. Create a [employer] ID. This will be your [employer] login ID and cannot be changed, so think of one that is secure and easy to remember. 6. Create a [employer] password. You can change your password at any time. 7. Enter your Password Reset Question and Answer. This can be used at a later time if you forget your password. 8. Enter your e-mail address. You will receive e-mail notification when new information is available in [address] 9. Click Sign Up. You can now view and download portions of your medical record. 10. Click the Download Summary menu link to download a portable copy of your medical information. Additional Information    If you have questions, please call [phone]. Remember, [employer] is NOT to be used for urgent needs. For medical emergencies, dial 911.

## 2020-01-08 NOTE — PROGRESS NOTES
Download reviewed and case discussed with NP  She is not compliant with PAP but PAP continues to benefit patient and remains necessary for the treatment of her sleep apnea  She does miss nights   Agree with plan  Note reviewed    Electronically signed by    Ena Verma MD  Diplomate in Sleep Medicine  [employer]

## 2020-01-08 NOTE — PROGRESS NOTES
[facility] [place]. [place], [address]   Tel.  [phone]   Fax. 100 [facility] 60   [place], [address] [facility]   Tel.  [phone]   Fax. [phone] [address]    Tel.  [phone]   Fax. [phone]       Subjective:   Emily Hemphill is a 76 y.o. female seen for a positive airway pressure follow-up, last seen by Dr. Roque Magallon on 1/2/2019, prior notes reviewed in detail. Home sleep test 3/2016 showed AHI of 18.6/hr with a lowest SaO2 of 71%. She  is here today for follow up. She reports problems using the device. The following problems are identified:    Drowsiness no Problems exhaling no   Snoring no Forget to put on no   Mask Comfortable yes Can't fall asleep no   Dry Mouth no Mask falls off no   Air Leaking yes Frequent awakenings no     She admits that her sleep has improved on PAP therapy using nasal mask and heated tubing. Review of device download indicated:  Auto pressure: 5-10 cmH2O; Max Pressure: 9.8 cmH2O;  95th percentile Pressure: 7.8 cmH2O   Average % Night in Large Leak:  0.6  % Used Days >= 4 hours: 40. Avg hours used:  4. Therapy Apnea Index averaged over PAP use: 3.1 /hr which reflects improved sleep breathing condition. Stovall Sleepiness Score: 8   and Modified F.O.S.Q. Score Total / 2: 17   which reflect improved sleep quality over therapy time.     Allergies   Allergen Reactions    Adhesive Rash and Itching    Advil [Ibuprofen] Swelling     FEET SWELL    Ceftin [Cefuroxime Axetil] Itching    Lotensin [Benazepril] Cough    Penicillins Swelling    Sulfa (Sulfonamide Antibiotics) Rash, Itching and Swelling       She has a current medication list which includes the following prescription(s): buspirone, losartan-hydrochlorothiazide, levothyroxine, rosuvastatin, metoprolol tartrate, sertraline, accu-chek compact plus test, magnesium oxide, blood sugar diagnostic, drum, metformin er, insulin glargine, humalog kwikpen insulin, cholecalciferol, aspirin, buffered, and omega-3 acid ethyl esters. .      She  has a past medical history of Arthritis, CAD (coronary artery disease) (2006), Deviated septum (12/20/13), Diabetes ([facility] [address] Heart disease, Hypertension, Incontinence, Nasal polyp, and Unspecified sleep apnea (12/20/2013). Sleep Review of Systems: notable for Negative difficulty falling asleep; Positive awakenings at night; Negative early morning headaches; Negative memory problems; Negative concentration issues; Negative chest pain; Negative shortness of breath; Negative significant joint pain at night; Negative significant muscle pain at night; Negative rashes or itching; Negative heartburn; Negative significant mood issues; 3-5 afternoon naps per week; Negative history of any automobile or occupational accidents due to daytime drowsiness    Objective:     Visit Vitals  BP 95/51 (BP 1 Location: Left arm, BP Patient Position: Sitting)   Pulse 69   Temp 97.5 °F (36.4 °C)   Ht 5' 2\" (1.575 m)   Wt 202 lb 11.2 oz (91.9 kg)   SpO2 100%   BMI 37.07 kg/m²        General:   Alert, oriented, not in acute distress   Eyes:  Anicteric Sclerae; no obvious strabismus   Nose:  No obvious nasal septum deviation    Oropharynx:   Mallampati score 4, thick tongue base, uvula not seen due to low-lying soft palate, narrow tonsilo-pharyngeal pilars   Neck:   midline trachea,      Chest/Lungs:  Symmetrical lung expansion , clear lung fields on auscultation    CVS:  Normal rate, regular rhythm,  no JVD   Extremities:  No obvious rashes, no edema    Neuro:  No focal deficits; No obvious tremor    Psych:  Normal affect,  normal countenance       Assessment:        ICD-10-CM ICD-9-CM    1. Obstructive sleep apnea (adult) (pediatric) G47.33 327.23 AMB SUPPLY ORDER   2. Essential hypertension with goal blood pressure less than 130/80 I10 401.9    3. Type 2 diabetes with nephropathy (HCC) E11.21 250.40      583.81    4.  Adult BMI 37.0-37.9 kg/sq m Z68.37 V85.37        AHI = 18.6(3/2016). On APAP:  5-10 cmH2O.    she is not adherent with PAP therapy although when used PAP shows benefit to the patient and remains necessary for control of her sleep apnea. There is continued clinical benefit from the hours of use demonstrated by AHI reduced from 17.7/hr to 3.1/hr.      Plan:     Follow-up and Dispositions    · Return in about 1 year (around 1/8/2021). * Continue on current pressures    Orders Placed This Encounter    AMB SUPPLY ORDER     Diagnosis: (G47.33) TREVIN (obstructive sleep apnea)  (primary encounter diagnosis)     Replacement Supplies for Positive Airway Pressure Therapy Device:   Duration of need: 99 months.  Nasal Cushion (Replace) 2 per month.  Nasal Interface Mask 1 every 3 months.  Pos Airway pressure chin strap   Headgear 1 every 6 months.  Tubing with heating element 1 every 3 months.  Filter(s) Disposable 2 per month.  Filter(s) Non-Disposable 1 every 6 months. .   [facility] for Humidifier (Replace) 1 every 6 months. BRISA Kelley; NPI: [id number]    Electronically signed. Date:- 01/08/20     * Counseling was provided regarding the importance of regular PAP use with emphasis on ensuring sufficient total sleep time, proper sleep hygiene, and safe driving. * Re-enforced proper and regular cleaning for the device. * She was asked to contact our office for any problems regarding  PAP therapy. 2. Hypertension -  continue on her current regimen, she will continue to monitor her BP and follow up with her PMD for reevaluation/adjustment of medications if warranted. I have reviewed the relationship between hypertension as it relates to sleep-disordered breathing. 3. Type II diabetes -  she continues on her current regimen. I have reviewed the relationship between sleep disordered breathing as it relates to diabetes.     4.  Recommended a dedicated weight loss program through appropriate diet and exercise regimen as significant weight reduction has been shown to reduce severity of obstructive sleep apnea. Patient's phone number [phone] (home)  was reviewed and confirmed for accuracy. She gives permission for messages regarding results and appointments to be left at that number. Evans Jon, [facility] Center[facility], [address] [place] [address]  Electronically signed.  1/8/2020

## 2020-01-16 ENCOUNTER — HOSPITAL ENCOUNTER (OUTPATIENT)
Dept: MRI IMAGING | Age: 75
Discharge: HOME | End: 2020-01-16
Attending: INTERNAL MEDICINE
Payer: MEDICARE

## 2020-01-16 VITALS — WEIGHT: 200 LBS | BODY MASS INDEX: 36.58 KG/M2

## 2020-01-16 DIAGNOSIS — M89.8X8 MASS OF SPINE: ICD-10-CM

## 2020-01-16 PROCEDURE — 72158 MRI LUMBAR SPINE W/O & W/DYE: CPT

## 2020-01-16 PROCEDURE — 74011250636 HC RX REV CODE- 250/636: Performed by: INTERNAL MEDICINE

## 2020-01-16 PROCEDURE — A9575 INJ GADOTERATE MEGLUMI 0.1ML: HCPCS | Performed by: INTERNAL MEDICINE

## 2020-01-16 RX ORDER — GADOTERATE MEGLUMINE 376.9 MG/ML
20 INJECTION INTRAVENOUS ONCE
Status: COMPLETED | OUTPATIENT
Start: 2020-01-16 | End: 2020-01-16

## 2020-01-16 RX ADMIN — GADOTERATE MEGLUMINE 20 ML: 376.9 INJECTION INTRAVENOUS at 10:09

## 2020-02-20 DIAGNOSIS — I10 ESSENTIAL HYPERTENSION: ICD-10-CM

## 2020-02-21 ENCOUNTER — PATIENT MESSAGE (OUTPATIENT)
Dept: INTERNAL MEDICINE CLINIC | Age: 75
End: 2020-02-21

## 2020-02-21 RX ORDER — METOPROLOL TARTRATE 50 MG/1
TABLET ORAL
Qty: 90 TAB | Refills: 3 | Status: SHIPPED | OUTPATIENT
Start: 2020-02-21 | End: 2020-06-29

## 2020-02-24 NOTE — TELEPHONE ENCOUNTER
From: Yair Rai  To: Leia Barron MD  Sent: 2/21/2020 11:32 PM EST  Subject: Update Medical Information    MEDICATION UPDATE  No longer taking buspirone 5 mg.   Started on Myrbetriq 50 mg as per Dr. Paris England (Urology)

## 2020-02-25 ENCOUNTER — OFFICE VISIT (OUTPATIENT)
Dept: CARDIOLOGY CLINIC | Age: 75
End: 2020-02-25

## 2020-02-25 VITALS
OXYGEN SATURATION: 96 % | HEIGHT: 62 IN | HEART RATE: 56 BPM | SYSTOLIC BLOOD PRESSURE: 110 MMHG | WEIGHT: 206 LBS | BODY MASS INDEX: 37.91 KG/M2 | RESPIRATION RATE: 16 BRPM | DIASTOLIC BLOOD PRESSURE: 70 MMHG

## 2020-02-25 DIAGNOSIS — E11.21 TYPE 2 DIABETES WITH NEPHROPATHY (HCC): ICD-10-CM

## 2020-02-25 DIAGNOSIS — I25.83 CORONARY ARTERY DISEASE DUE TO LIPID RICH PLAQUE: Primary | ICD-10-CM

## 2020-02-25 DIAGNOSIS — I25.10 CORONARY ARTERY DISEASE DUE TO LIPID RICH PLAQUE: Primary | ICD-10-CM

## 2020-02-25 DIAGNOSIS — E66.01 SEVERE OBESITY (BMI 35.0-39.9) WITH COMORBIDITY (HCC): ICD-10-CM

## 2020-02-25 DIAGNOSIS — I10 ESSENTIAL HYPERTENSION: ICD-10-CM

## 2020-02-25 DIAGNOSIS — E78.2 MIXED HYPERLIPIDEMIA: ICD-10-CM

## 2020-02-25 RX ORDER — LANOLIN ALCOHOL/MO/W.PET/CERES
1000 CREAM (GRAM) TOPICAL DAILY
COMMUNITY

## 2020-02-25 NOTE — LETTER
2/26/20 Patient: Diogenes Goss YOB: 1945 Date of Visit: 2/25/2020 Kim Meng MD 
[place] [address] [address] [facility] 99 [id number] VIA In Basket Dear Kim Meng MD, Thank you for referring Ms. Cady Chisholm to [facility] for evaluation. My notes for this consultation are attached. If you have questions, please do not hesitate to call me. I look forward to following your patient along with you.  
 
 
Sincerely, 
 
Angela Westbrook MD

## 2020-02-25 NOTE — PROGRESS NOTES
[facility] [place]: [facility]  [phone]          Cardiology Consult/Progress Note      Requesting/referring provider: Dr. Shagufta Benavides  Reason for Consult: Coronary artery disease    HPI: Akin Galeano, a 76y.o. year-old who presents for evaluation of elevated calcium score. Mrs. Andry Rene has history of hypertension diabetes and coronary artery disease. Apparently in 2004 she underwent a cardiac catheterization was suspected to have possible dissection requiring management with placement of a drug-eluting stent at [address] [place] [address] by Dr. Eliza De Jesus. She has subsequently not been cathed. Currently she denies any symptoms of chest pain but is easily fatigued and often short of breath with activity. Her day-to-day activity level is pretty low. Recently she underwent a coronary calcium/Agatson score which was significantly elevated close to about 2000. She is here to discuss the results. In 2019 summer, patient was in [place] and had 2 episodes of chest pain for which she had to visit hospital ER. Troponins were negative at that time and they recommended further follow-up with cardiology. She was suggested to undergo a stress test at that time but declined. Over the past 6 months she reports no new episodes of chest discomfort and has done very well. She continues to have exercise intolerance and is morbidly obese and has not been able to lose weight though. ECG: August 2019 sinus rhythm nonspecific ST-T changes  Echocardiogram February 2020: Normal LV systolic function, mild LVH, no significant valvular lesions    Assessment/Plan:  1. Coronary artery disease manifested in the form   A. Possible ACS versus coronary dissection in 2004  B. Severe single-vessel disease involving RCA based on history  C. Normal LV systolic function  D. No known subsequent residual disease  E.  Currently no residual angina or heart failure symptoms    2.   Functional intolerance/exercise intolerance potentially related to advanced coronary artery disease  3. Hypertension essential--well controlled  4. Morbid obesity with sleep apnea  5. Type 2 diabetes mellitus    Mrs. Liban Vizcaino was seen with her  for discussion regarding recently identified elevated coronary calcium score. I discussed the significance of this result and told her that independent of elevated calcium score being a diabetic and with previous diagnosis of coronary artery disease, she should be treated as such. Hence we converted her simvastatin to high intensity statin therapy the last time. She should also continue metoprolol for antianginal benefit and losartan for antihypertensive benefit. Currently, she has no recurrent angina and continues on medical therapy treatment without side effects. Plan to see her back in 6 months and lipids will be reevaluated at that time. Benefits of weight loss and dietary modification were discussed with the patient. []    High complexity decision making was performed  []    Patient is at high-risk of decompensation with multiple organ involvement  She  has a past medical history of Arthritis, CAD (coronary artery disease) (2006), Deviated septum (12/20/13), Diabetes ([facility] [address] Heart disease, Hypertension, Incontinence, Nasal polyp, and Unspecified sleep apnea (12/20/2013). Review of system:Patient reports no PND/Orthpnea/CP. Reports no pedal edema. Over the past few years her weight has significantly increased. Diabetes control is marginal at best.  He reports no cough/fever/focal neurological deficits/abdominal pain. All other systems negative except as above.    Family History   Problem Relation Age of Onset    Heart Disease Mother     Heart Surgery Mother         VALVE REPLACED AT AGE 72    Heart Attack Father 46        MASSIVE     Anesth Problems Neg Hx       Social History     Socioeconomic History    Marital status:      Spouse name: Not on file  Number of children: Not on file    Years of education: Not on file    Highest education level: Not on file   Tobacco Use    Smoking status: Former Smoker     Packs/day: 1.00     Years: 5.00     Pack years: 5.00     Last attempt to quit: 1970     Years since quittin.2    Smokeless tobacco: Never Used   Substance and Sexual Activity    Alcohol use: Yes     Comment: \"VERY LITTLE ALCOHOL\"    Drug use: No   Social History Narrative            3 children women 40, 40, 37 healthy other than skin issues      PE  Vitals:    20 1423   BP: 110/70   Pulse: (!) 56   Resp: 16   SpO2: 96%   Weight: 206 lb (93.4 kg)   Height: 5' 2\" (1.575 m)    Body mass index is 37.68 kg/m². General:    Alert, cooperative, no distress. Morbidly obese   Psychiatric:    Normal Mood and affect    Eye/ENT:      Pupils equal, No asymmetry, Conjunctival slightly pale. Able to hear voice at normal amplitude   Lungs:      Visibly symmetric chest expansion, No palpable tenderness. Clear to auscultation bilaterally. Heart[de-identified]    Regular rate and rhythm, S1, S2 normal, no murmur, click, rub or gallop. No JVD, Normal palpable peripheral pulses. No cyanosis   Abdomen:     Soft, non-tender. Bowel sounds normal. No masses,  No      organomegaly. Extremities:   Extremities normal, atraumatic, no edema. Neurologic:   CN II-XII grossly intact.  No gross focal deficits           Recent Labs:  Lab Results   Component Value Date/Time    Cholesterol, total 135 2019 10:18 AM    HDL Cholesterol 51 2019 10:18 AM    LDL, calculated 43 2019 10:18 AM    Triglyceride 207 (H) 2019 10:18 AM     Lab Results   Component Value Date/Time    Creatinine 0.97 2019 09:37 AM     Lab Results   Component Value Date/Time    BUN 22 (H) 2019 09:37 AM     Lab Results   Component Value Date/Time    Potassium 4.0 2019 09:37 AM     Lab Results   Component Value Date/Time    Hemoglobin A1c 7.8 (H) 2016 09:25 AM Hemoglobin A1c, External 7.7 2019     Lab Results   Component Value Date/Time    HGB 13.9 2019 08:57 AM     Lab Results   Component Value Date/Time    PLATELET 210  08:57 AM       Reviewed:  Past Medical History:   Diagnosis Date    Arthritis     CAD (coronary artery disease) 2006    STENT PLACED>PLAQUE BROKE OFF--HAD \"MINOR MI\"    Deviated septum 13    HAS TROUBLE BREATHING THROUGH LEFT SIDE; CT SHOWED POLYP    Diabetes (HCC)     Heart disease     Hypertension     Incontinence     Nasal polyp     Unspecified sleep apnea 2013    cpap     Social History     Tobacco Use   Smoking Status Former Smoker    Packs/day: 1.00    Years: 5.00    Pack years: 5.00    Last attempt to quit: 1970    Years since quittin.2   Smokeless Tobacco Never Used     Social History     Substance and Sexual Activity   Alcohol Use Yes    Comment: \"VERY LITTLE ALCOHOL\"     Allergies   Allergen Reactions    Adhesive Rash and Itching    Advil [Ibuprofen] Swelling     FEET SWELL    Ceftin [Cefuroxime Axetil] Itching    Lotensin [Benazepril] Cough    Penicillins Swelling    Sulfa (Sulfonamide Antibiotics) Rash, Itching and Swelling     Family History   Problem Relation Age of Onset    Heart Disease Mother     Heart Surgery Mother         VALVE REPLACED AT AGE 72    Heart Attack Father 46        MASSIVE     Anesth Problems Neg Hx         Current Outpatient Medications   Medication Sig    cyanocobalamin 1,000 mcg tablet Take 1,000 mcg by mouth daily.  mirabegron ER (MYRBETRIQ) 50 mg ER tablet Take 50 mg by mouth daily.  metoprolol tartrate (LOPRESSOR) 50 mg tablet TAKE 1 TABLET BY MOUTH TWICE A DAY    losartan-hydroCHLOROthiazide (HYZAAR) 50-12.5 mg per tablet Take 1 Tab by mouth two (2) times a day.  levothyroxine (SYNTHROID) 50 mcg tablet Take 1 po daily    rosuvastatin (CRESTOR) 20 mg tablet Take 1 Tab by mouth nightly for 360 days.     sertraline (ZOLOFT) 100 mg tablet TAKE 1 TABLET BY MOUTH EVERY DAY IN THE EVENING    ACCU-CHEK COMPACT PLUS TEST strp USE TO TEST BLOOD SUGAR 4 TIMES DAILY. DX E11.9    magnesium oxide (MAG-OX) 400 mg tablet TAKE 1 TAB BY MOUTH TWO (2) TIMES A DAY.  Blood Sugar Diagnostic, Drum (ACCU-CHEK COMPACT PLUS TEST) strp USE TO TEST BLOOD SUGAR 4 TIMES DAILY. DX E11.9    metFORMIN ER (GLUCOPHAGE XR) 500 mg tablet TAKE 4 TABLETS BY MOUTH EVERY DAY    insulin glargine (LANTUS) 100 unit/mL injection 20 Units by SubCUTAneous route two (2) times a day. (Patient taking differently: 46 Units by SubCUTAneous route two (2) times a day. 50 units once daily)    HUMALOG KWIKPEN 100 unit/mL kwikpen Sliding scale tid    Cholecalciferol, Vitamin D3, (VITAMIN D3) 1,000 unit cap Take 1 Cap by mouth nightly.  Aspirin, Buffered 81 mg tab Take  by mouth daily.  omega-3 acid ethyl esters (LOVAZA) 1 gram capsule Take 2 g by mouth two (2) times a day.  busPIRone (BUSPAR) 5 mg tablet Take 1 Tab by mouth nightly. 1 hour prior to bed (Patient taking differently: Take 5 mg by mouth nightly. 1 hour prior to bed  Indications: not taking)     No current facility-administered medications for this visit. Lane Guerrero MD02/25/20 There are other unrelated non-urgent complaints, but due to the busy schedule and the amount of time I've already spent with her, time does not permit me to address these routine issues at today's visit. I've requested another appointment to review these additional issues.     [facility] [place]  [address], [address], [address] [place] [address]

## 2020-04-14 ENCOUNTER — VIRTUAL VISIT (OUTPATIENT)
Dept: INTERNAL MEDICINE CLINIC | Age: 75
End: 2020-04-14

## 2020-04-14 DIAGNOSIS — I10 ESSENTIAL HYPERTENSION: Primary | ICD-10-CM

## 2020-04-14 DIAGNOSIS — E11.9 CONTROLLED TYPE 2 DIABETES MELLITUS WITHOUT COMPLICATION, WITHOUT LONG-TERM CURRENT USE OF INSULIN (HCC): ICD-10-CM

## 2020-04-14 DIAGNOSIS — I25.10 CORONARY ARTERY DISEASE INVOLVING NATIVE HEART WITHOUT ANGINA PECTORIS, UNSPECIFIED VESSEL OR LESION TYPE: ICD-10-CM

## 2020-04-14 DIAGNOSIS — G47.00 INSOMNIA, UNSPECIFIED TYPE: ICD-10-CM

## 2020-04-14 NOTE — PROGRESS NOTES
Consent: Devan Muro, who was seen by synchronous (real-time) audio-video technology, and/or her healthcare decision maker, is aware that this patient-initiated, Telehealth encounter on 4/14/2020 is a billable service, with coverage as determined by her insurance carrier. She is aware that she may receive a bill and has provided verbal consent to proceed: YES      712  Assessment & Plan:   Diagnoses and all orders for this visit:    1. Essential hypertension  Well-controlled  We will continue her current regimen    2. Controlled type 2 diabetes mellitus without complication, without long-term current use of insulin ([facility])  Last A1c reflecting progressive illness at 8.1  She is following with Dr. Bev Ramos and insulin was increased, GLP-1 Ozempic was not introduced due to potential side effects with patient    3. Coronary artery disease involving native heart without angina pectoris, unspecified vessel or lesion type  Continue statin  No side effects    4. Insomnia, unspecified type  Encourage patient to make behavioral changes to improve insomnia. She is walking daily with a mask. Follow-up in 4 months or earlier if needed. She is being followed by endocrine and cardiology. Subjective:   Devan Muro is a 76 y.o. female who was seen for Thyroid Problem and Medication Evaluation        Since last visit she has been seen by Dr. Bev Ramos. Coronavirus precautions  She has not been to the grocery store. Her  has been getting the groceries with a mask. She is walking 1/4 of a mile no cp or sob      Diabetes  Dr. Bev Ramos hold off on GLP 1 due to potential side effects of medication. She was seen a month, March 20, 2020  Increased insulin due to hemoglobin A1c increased at 8.1  Passover holiday lots of starch  Dr. Dick Stokes + cataract, no diabetic retinopathy, Dec or Jan (opthal)  Podiatry is seen annually also  cecilia campbell [facility] toenail.     Patient's last hemoglobin A1c was 7.7 this month in August. Insomnia  cpap still 6 hours not tired when wakes up and tired during the day   notes that patient's sleep schedule and sleep is still not optimal.  She has been compliant with CPAP.    126/62    Matteo Steele is a 76 y.o. female with hypertension. At home blood pressure is 126/62 and heart rate 54-56  Hypertension ROS: taking medications as instructed, no medication side effects noted, no TIA's, no chest pain on exertion, no dyspnea on exertion, no swelling of ankles. New concerns she is tolerating her losartan 50/12.5 x2. She denies lightheaded or dizziness. She denies edema. She is using her CPAP machine. He notices the increase in metoprolol to 50 mg twice a day has made his wife tired. This was increased by patient's cardiologist  Memory issues  Did not discuss    CAD  crestor no muscle aches or pains  Patient is getting labs done with Dr. Rachel Rhodes  Patient has transitioned her cardiac care to Dr. Char Ramirez. He has switched her simvastatin to Crestor and increased her metoprolol. She has been compliant with these changes. She denies chest pain or shortness of breath. She has a follow-up with him in 6 months. She was having some epigastric pain. She has a follow-up with GI in 4 days. Dr. Javier Laughlin recommended more medical management. She will look into weight watchers and a CAC. Urology  Pt patient on myrbetriq. She was switched to an anticholinergic and now she is back on Myrbetriq. Current Outpatient Medications   Medication Sig    levothyroxine (SYNTHROID) 50 mcg tablet TAKE 1 TABLET BY MOUTH EVERY DAY    cyanocobalamin 1,000 mcg tablet Take 1,000 mcg by mouth daily.  mirabegron ER (MYRBETRIQ) 50 mg ER tablet Take 50 mg by mouth daily.  metoprolol tartrate (LOPRESSOR) 50 mg tablet TAKE 1 TABLET BY MOUTH TWICE A DAY    losartan-hydroCHLOROthiazide (HYZAAR) 50-12.5 mg per tablet Take 1 Tab by mouth two (2) times a day.     rosuvastatin (CRESTOR) 20 mg tablet Take 1 Tab by mouth nightly for 360 days.  sertraline (ZOLOFT) 100 mg tablet TAKE 1 TABLET BY MOUTH EVERY DAY IN THE EVENING    ACCU-CHEK COMPACT PLUS TEST strp USE TO TEST BLOOD SUGAR 4 TIMES DAILY. DX E11.9    magnesium oxide (MAG-OX) 400 mg tablet TAKE 1 TAB BY MOUTH TWO (2) TIMES A DAY.  Blood Sugar Diagnostic, Drum (ACCU-CHEK COMPACT PLUS TEST) strp USE TO TEST BLOOD SUGAR 4 TIMES DAILY. DX E11.9    metFORMIN ER (GLUCOPHAGE XR) 500 mg tablet TAKE 4 TABLETS BY MOUTH EVERY DAY    insulin glargine (LANTUS) 100 unit/mL injection 20 Units by SubCUTAneous route two (2) times a day. (Patient taking differently: 44 Units by SubCUTAneous route two (2) times a day. 44 units once daily)    HUMALOG KWIKPEN 100 unit/mL kwikpen Sliding scale tid    Cholecalciferol, Vitamin D3, (VITAMIN D3) 1,000 unit cap Take 1 Cap by mouth nightly.  Aspirin, Buffered 81 mg tab Take  by mouth daily.  omega-3 acid ethyl esters (LOVAZA) 1 gram capsule Take 2 g by mouth two (2) times a day. No current facility-administered medications for this visit. Allergies   Allergen Reactions    Adhesive Rash and Itching    Advil [Ibuprofen] Swelling     FEET SWELL    Ceftin [Cefuroxime Axetil] Itching    Lotensin [Benazepril] Cough    Penicillins Swelling    Sulfa (Sulfonamide Antibiotics) Rash, Itching and Swelling     Subjective    Past Medical History:   Diagnosis Date    Arthritis     CAD (coronary artery disease) 2006    STENT PLACED>PLAQUE BROKE OFF--HAD \"MINOR MI\"    Deviated septum 12/20/13    HAS TROUBLE BREATHING THROUGH LEFT SIDE; CT SHOWED POLYP    Diabetes ([address]     Heart disease     Hypertension     Incontinence     Nasal polyp     Unspecified sleep apnea 12/20/2013    cpap       ROS  All other systems reviewed and negative, unless mentioned in HPI    Objective:   Vital Signs: (As obtained by patient/caregiver at home)  There were no vitals taken for this visit.      [INSTRUCTIONS:  \"[x]\" Indicates a positive item  \"[]\" Indicates a negative item  -- DELETE ALL ITEMS NOT EXAMINED]    Constitutional: [x] Appears well-developed and well-nourished [x] No apparent distress      [] Abnormal -     Mental status: [x] Alert and awake  [x] Oriented to person/place/time [x] Able to follow commands    [] Abnormal -     Eyes:   EOM    [x]  Normal    [] Abnormal -   Sclera  [x]  Normal    [] Abnormal -          Discharge [x]  None visible   [] Abnormal -     HENT: [x] Normocephalic, atraumatic  [] Abnormal -   [x] Mouth/Throat: Mucous membranes are moist    External Ears [x] Normal  [] Abnormal -    Neck: [x] No visualized mass [] Abnormal -     Pulmonary/Chest: [x] Respiratory effort normal   [x] No visualized signs of difficulty breathing or respiratory distress        [] Abnormal -      Musculoskeletal:   [x] Normal gait with no signs of ataxia         [x] Normal range of motion of neck        [] Abnormal -     Neurological:        [x] No Facial Asymmetry (Cranial nerve 7 motor function) (limited exam due to video visit)          [x] No gaze palsy        [] Abnormal -          Skin:        [x] No significant exanthematous lesions or discoloration noted on facial skin         [] Abnormal -            Psychiatric:       [x] Normal Affect [] Abnormal -        [x] No Hallucinations    Other pertinent observable physical exam findings:-      We discussed the expected course, resolution and complications of the diagnosis(es) in detail. Medication risks, benefits, costs, interactions, and alternatives were discussed as indicated. I advised her to contact the office if her condition worsens, changes or fails to improve as anticipated. She expressed understanding with the diagnosis(es) and plan. Leonardo Cheung is a 76 y.o. female being evaluated by a video visit encounter for concerns as above. A caregiver was present when appropriate.  Due to this being a TeleHealth encounter (During [facility]- public health emergency), evaluation of the following organ systems was limited: Vitals/Constitutional/EENT/Resp/CV/GI//MS/Neuro/Skin/Heme-Lymph-Imm. Pursuant to the emergency declaration under the [address] [place] [address], [facility] waiver authority and the [employer] Act, this Virtual  Visit was conducted, with patient's (and/or legal guardian's) consent, to reduce the patient's risk of exposure to COVID-19 and provide necessary medical care. Services were provided through a video synchronous discussion virtually to substitute for in-person clinic visit. Patient and provider were located at their individual homes.     Damion Dodson MD

## 2020-05-01 DIAGNOSIS — I10 ESSENTIAL HYPERTENSION: ICD-10-CM

## 2020-05-01 RX ORDER — LOSARTAN POTASSIUM AND HYDROCHLOROTHIAZIDE 12.5; 5 MG/1; MG/1
1 TABLET ORAL 2 TIMES DAILY
Qty: 180 TAB | Refills: 0 | Status: SHIPPED | OUTPATIENT
Start: 2020-05-01 | End: 2020-08-24

## 2020-08-11 RX ORDER — ROSUVASTATIN CALCIUM 20 MG/1
TABLET, COATED ORAL
Qty: 90 TAB | Refills: 1 | Status: SHIPPED | OUTPATIENT
Start: 2020-08-11 | End: 2021-02-02

## 2020-08-11 NOTE — TELEPHONE ENCOUNTER
Requested Prescriptions     Signed Prescriptions Disp Refills    rosuvastatin (CRESTOR) 20 mg tablet 90 Tab 1     Sig: TAKE 1 TABLET BY MOUTH EVERY DAY AT BEDTIME     Authorizing Provider: Zaid Kyle     Ordering User: Farnaz Juarez       Last office visit 2/25/2020    Per Dr. Robe Castaneda   Date Time Provider Olesya Bettina   8/18/2020  1:00 PM MD JOHN Russo BS AMB   8/27/2020  8:40 AM Anna Michel MD [facility] BS AMB   1/6/2021 10:30 AM SPT CT 1 SPTRCT SPT   1/13/2021 10:30 AM Bettina Corbin, NP [facility] BS AMB

## 2020-08-18 ENCOUNTER — OFFICE VISIT (OUTPATIENT)
Dept: CARDIOLOGY CLINIC | Age: 75
End: 2020-08-18
Payer: MEDICARE

## 2020-08-18 VITALS
DIASTOLIC BLOOD PRESSURE: 70 MMHG | WEIGHT: 198 LBS | RESPIRATION RATE: 18 BRPM | SYSTOLIC BLOOD PRESSURE: 110 MMHG | OXYGEN SATURATION: 95 % | HEIGHT: 62 IN | HEART RATE: 58 BPM | BODY MASS INDEX: 36.44 KG/M2

## 2020-08-18 DIAGNOSIS — Z79.4 TYPE 2 DIABETES MELLITUS WITHOUT COMPLICATION, WITH LONG-TERM CURRENT USE OF INSULIN (HCC): ICD-10-CM

## 2020-08-18 DIAGNOSIS — I25.83 CORONARY ARTERY DISEASE DUE TO LIPID RICH PLAQUE: Primary | ICD-10-CM

## 2020-08-18 DIAGNOSIS — I10 ESSENTIAL HYPERTENSION: ICD-10-CM

## 2020-08-18 DIAGNOSIS — E11.9 TYPE 2 DIABETES MELLITUS WITHOUT COMPLICATION, WITH LONG-TERM CURRENT USE OF INSULIN (HCC): ICD-10-CM

## 2020-08-18 DIAGNOSIS — I51.9 HEART DISEASE: ICD-10-CM

## 2020-08-18 DIAGNOSIS — E78.2 MIXED HYPERLIPIDEMIA: ICD-10-CM

## 2020-08-18 DIAGNOSIS — E66.01 SEVERE OBESITY (BMI 35.0-39.9) WITH COMORBIDITY (HCC): ICD-10-CM

## 2020-08-18 DIAGNOSIS — I25.10 CORONARY ARTERY DISEASE DUE TO LIPID RICH PLAQUE: Primary | ICD-10-CM

## 2020-08-18 PROCEDURE — G8417 CALC BMI ABV UP PARAM F/U: HCPCS | Performed by: INTERNAL MEDICINE

## 2020-08-18 PROCEDURE — 99215 OFFICE O/P EST HI 40 MIN: CPT | Performed by: INTERNAL MEDICINE

## 2020-08-18 PROCEDURE — 1101F PT FALLS ASSESS-DOCD LE1/YR: CPT | Performed by: INTERNAL MEDICINE

## 2020-08-18 PROCEDURE — G8432 DEP SCR NOT DOC, RNG: HCPCS | Performed by: INTERNAL MEDICINE

## 2020-08-18 PROCEDURE — 3046F HEMOGLOBIN A1C LEVEL >9.0%: CPT | Performed by: INTERNAL MEDICINE

## 2020-08-18 PROCEDURE — 93005 ELECTROCARDIOGRAM TRACING: CPT | Performed by: INTERNAL MEDICINE

## 2020-08-18 PROCEDURE — G8400 PT W/DXA NO RESULTS DOC: HCPCS | Performed by: INTERNAL MEDICINE

## 2020-08-18 PROCEDURE — 3017F COLORECTAL CA SCREEN DOC REV: CPT | Performed by: INTERNAL MEDICINE

## 2020-08-18 PROCEDURE — G8536 NO DOC ELDER MAL SCRN: HCPCS | Performed by: INTERNAL MEDICINE

## 2020-08-18 PROCEDURE — G8427 DOCREV CUR MEDS BY ELIG CLIN: HCPCS | Performed by: INTERNAL MEDICINE

## 2020-08-18 PROCEDURE — G8754 DIAS BP LESS 90: HCPCS | Performed by: INTERNAL MEDICINE

## 2020-08-18 PROCEDURE — 1090F PRES/ABSN URINE INCON ASSESS: CPT | Performed by: INTERNAL MEDICINE

## 2020-08-18 PROCEDURE — 2022F DILAT RTA XM EVC RTNOPTHY: CPT | Performed by: INTERNAL MEDICINE

## 2020-08-18 PROCEDURE — G8752 SYS BP LESS 140: HCPCS | Performed by: INTERNAL MEDICINE

## 2020-08-18 PROCEDURE — 93010 ELECTROCARDIOGRAM REPORT: CPT | Performed by: INTERNAL MEDICINE

## 2020-08-18 RX ORDER — METOPROLOL TARTRATE 25 MG/1
TABLET, FILM COATED ORAL
Qty: 180 TAB | Refills: 3 | Status: SHIPPED | OUTPATIENT
Start: 2020-08-18 | End: 2021-10-18

## 2020-08-18 RX ORDER — DULAGLUTIDE 1.5 MG/.5ML
1.5 INJECTION, SOLUTION SUBCUTANEOUS
COMMUNITY
Start: 2020-08-03 | End: 2021-12-14

## 2020-08-18 NOTE — LETTER
8/18/20 Patient: Dao Matias YOB: 1945 Date of Visit: 8/18/2020 Kashmir Martino MD 
[address] [facility], [facility] [id number] VIA In Basket Dear Kashmir Martino MD, Thank you for referring Ms. Tone Sage to [facility] for evaluation. My notes for this consultation are attached. If you have questions, please do not hesitate to call me. I look forward to following your patient along with you.  
 
 
Sincerely, 
 
Enid Sharma MD

## 2020-08-18 NOTE — PROGRESS NOTES
[place]: Vincent Vasques  [phone]          Cardiology Progress Note      Requesting/referring provider: Dr. Lul Mendoza  Reason for Consult: Coronary artery disease    HPI: Romraio Coyne, a 76y.o. year-old who presents for evaluation of elevated calcium score. Mrs. German Riedel has history of hypertension diabetes and coronary artery disease. Apparently in 2004 she underwent a cardiac catheterization was suspected to have possible dissection requiring management with placement of a drug-eluting stent at [facility] by Dr. Sahil Mondragon. She has subsequently not been cathed. Currently she denies any symptoms of chest pain but is easily fatigued and often short of breath with activity. Her day-to-day activity level is pretty low. Recently she underwent a coronary calcium/Agatson score which was significantly elevated close to about 2000. She is here to discuss the results. In 2019 summer, patient was in [place] and had 2 episodes of chest pain for which she had to visit hospital ER. Troponins were negative at that time and they recommended further follow-up with cardiology. She was suggested to undergo a stress test at that time but declined. Over the past 6 months she reports no new episodes of chest discomfort and has done very well. She continues to have exercise intolerance and is morbidly obese and has not been able to lose weight though. ECG: August 2020 sinuss bradycardia nonspecific ST-T changes  Echocardiogram February 2020: Normal LV systolic function, mild LVH, no significant valvular lesions  Lab Results   Component Value Date/Time    Cholesterol, total 135 09/06/2019 10:18 AM    HDL Cholesterol 51 09/06/2019 10:18 AM    LDL, calculated 43 09/06/2019 10:18 AM    VLDL, calculated 41 (H) 09/06/2019 10:18 AM    Triglyceride 207 (H) 09/06/2019 10:18 AM       Assessment/Plan:  1. Coronary artery disease manifested in the form   A.   Possible ACS versus coronary dissection in 2004  B. Severe single-vessel disease involving RCA based on history  C. Normal LV systolic function  D. No known subsequent residual disease  E.  Currently no residual angina or heart failure symptoms    2. Functional intolerance/exercise intolerance potentially related to advancing coronary artery disease  3. Hypertension essential--well controlled  4. Morbid obesity with sleep apnea  5. Type 2 diabetes mellitus  6. Sinus bradycardia with higher doses of beta-blocker    Mrs. Shannon Godinez was seen with her  for discussion regarding coronary artery disease. She had elevated calcium scoring prior balloon angioplasty with Dr. Sola Daley about 20 years ago. Off-and-on she has atypical chest discomfort for the past 6 months she is angina free. I believe she has underlying likely significant coronary disease but currently that appears to be not causing any symptoms and can be managed medically. Anyhow the patient is not interested in coronary angiography or stress test at this point of time. She is on optimal medical therapy at this time but due to sinus bradycardia and tiredness and reduce her metoprolol to 25 mg twice a day. Chronic cardiovascular problems such as hypertension, diabetes are currently well controlled. She has morbid obesity but has lost 8 pounds over the past 6 months and continues to attempt further weight loss. She will complete continue ARB and hydrochlorothiazide for blood pressure control given underlying proteinuria from diabetes.  will get blood pressure record at home and will bring the blood pressure log next visit. I discussed continued activity and attempt weight loss in order to improve her overall cardiovascular health.   We will see her back in 6 months.        []    High complexity decision making was performed  []    Patient is at high-risk of decompensation with multiple organ involvement  She  has a past medical history of Arthritis, CAD (coronary artery disease) (), Deviated septum (13), Diabetes ([facility] [address] Heart disease, Hypertension, Incontinence, Nasal polyp, and Unspecified sleep apnea (2013). Review of system:Patient reports no PND/Orthpnea/CP. Reports no pedal edema. Over the past few years her weight has significantly increased. Diabetes control is marginal at best.  He reports no cough/fever/focal neurological deficits/abdominal pain. All other systems negative except as above. Family History   Problem Relation Age of Onset    Heart Disease Mother     Heart Surgery Mother         VALVE REPLACED AT AGE 72    Heart Attack Father 46        MASSIVE     Anesth Problems Neg Hx       Social History     Socioeconomic History    Marital status:      Spouse name: Not on file    Number of children: Not on file    Years of education: Not on file    Highest education level: Not on file   Tobacco Use    Smoking status: Former Smoker     Packs/day: 1.00     Years: 5.00     Pack years: 5.00     Last attempt to quit: 1970     Years since quittin.6    Smokeless tobacco: Never Used   Substance and Sexual Activity    Alcohol use: Yes     Comment: \"VERY LITTLE ALCOHOL\"    Drug use: No   Social History Narrative            3 children women 36, 40, 37 healthy other than skin issues      PE  There were no vitals filed for this visit. There is no height or weight on file to calculate BMI. General:    Alert, cooperative, no distress. Morbidly obese   Psychiatric:    Normal Mood and affect    Eye/ENT:      Pupils equal, No asymmetry, Conjunctival slightly pale. Able to hear voice at normal amplitude   Lungs:      Visibly symmetric chest expansion, No palpable tenderness. Clear to auscultation bilaterally. Heart[de-identified]    Regular rate and rhythm, S1, S2 normal, no murmur, click, rub or gallop. No JVD, Normal palpable peripheral pulses. No cyanosis   Abdomen:     Soft, non-tender.  Bowel sounds normal. No masses,  No organomegaly. Extremities:   Extremities normal, atraumatic, no edema. Neurologic:   CN II-XII grossly intact.  No gross focal deficits           Recent Labs:  Lab Results   Component Value Date/Time    Cholesterol, total 135 2019 10:18 AM    HDL Cholesterol 51 2019 10:18 AM    LDL, calculated 43 2019 10:18 AM    Triglyceride 207 (H) 2019 10:18 AM     Lab Results   Component Value Date/Time    Creatinine 0.97 2019 09:37 AM     Lab Results   Component Value Date/Time    BUN 22 (H) 2019 09:37 AM     Lab Results   Component Value Date/Time    Potassium 4.0 2019 09:37 AM     Lab Results   Component Value Date/Time    Hemoglobin A1c 7.8 (H) 2016 09:25 AM    Hemoglobin A1c, External 7.7 2019     Lab Results   Component Value Date/Time    HGB 13.9 2019 08:57 AM     Lab Results   Component Value Date/Time    PLATELET 335  08:57 AM       Reviewed:  Past Medical History:   Diagnosis Date    Arthritis     CAD (coronary artery disease) 2006    STENT PLACED>PLAQUE BROKE OFF--HAD \"MINOR MI\"    Deviated septum 13    HAS TROUBLE BREATHING THROUGH LEFT SIDE; CT SHOWED POLYP    Diabetes ([address]     Heart disease     Hypertension     Incontinence     Nasal polyp     Unspecified sleep apnea 2013    cpap     Social History     Tobacco Use   Smoking Status Former Smoker    Packs/day: 1.00    Years: 5.00    Pack years: 5.00    Last attempt to quit: 1970    Years since quittin.6   Smokeless Tobacco Never Used     Social History     Substance and Sexual Activity   Alcohol Use Yes    Comment: \"VERY LITTLE ALCOHOL\"     Allergies   Allergen Reactions    Adhesive Rash and Itching    Advil [Ibuprofen] Swelling     FEET SWELL    Ceftin [Cefuroxime Axetil] Itching    Lotensin [Benazepril] Cough    Penicillins Swelling    Sulfa (Sulfonamide Antibiotics) Rash, Itching and Swelling     Family History   Problem Relation Age of Onset    Heart Disease Mother     Heart Surgery Mother         VALVE REPLACED AT AGE 72    Heart Attack Father 46        MASSIVE     Anesth Problems Neg Hx         Current Outpatient Medications   Medication Sig    rosuvastatin (CRESTOR) 20 mg tablet TAKE 1 TABLET BY MOUTH EVERY DAY AT BEDTIME    levothyroxine (SYNTHROID) 50 mcg tablet TAKE 1 TABLET BY MOUTH EVERY DAY    metoprolol tartrate (LOPRESSOR) 50 mg tablet TAKE 1 TABLET BY MOUTH TWICE A DAY    sertraline (ZOLOFT) 100 mg tablet TAKE 1 TABLET BY MOUTH EVERY DAY IN THE EVENING    losartan-hydroCHLOROthiazide (HYZAAR) 50-12.5 mg per tablet Take 1 Tab by mouth two (2) times a day.  cyanocobalamin 1,000 mcg tablet Take 1,000 mcg by mouth daily.  mirabegron ER (MYRBETRIQ) 50 mg ER tablet Take 50 mg by mouth daily.  ACCU-CHEK COMPACT PLUS TEST strp USE TO TEST BLOOD SUGAR 4 TIMES DAILY. DX E11.9    magnesium oxide (MAG-OX) 400 mg tablet TAKE 1 TAB BY MOUTH TWO (2) TIMES A DAY.  Blood Sugar Diagnostic, Drum (ACCU-CHEK COMPACT PLUS TEST) strp USE TO TEST BLOOD SUGAR 4 TIMES DAILY. DX E11.9    metFORMIN ER (GLUCOPHAGE XR) 500 mg tablet TAKE 4 TABLETS BY MOUTH EVERY DAY    insulin glargine (LANTUS) 100 unit/mL injection 20 Units by SubCUTAneous route two (2) times a day. (Patient taking differently: 44 Units by SubCUTAneous route two (2) times a day. 44 units once daily)    HUMALOG KWIKPEN 100 unit/mL kwikpen Sliding scale tid    Cholecalciferol, Vitamin D3, (VITAMIN D3) 1,000 unit cap Take 1 Cap by mouth nightly.  Aspirin, Buffered 81 mg tab Take  by mouth daily.  omega-3 acid ethyl esters (LOVAZA) 1 gram capsule Take 2 g by mouth two (2) times a day. No current facility-administered medications for this visit. Talya Escobar MD08/18/20 There are other unrelated non-urgent complaints, but due to the busy schedule and the amount of time I've already spent with her, time does not permit me to address these routine issues at today's visit. I've requested another appointment to review these additional issues.     [facility] [place]  [address], [address], [address]

## 2020-08-23 DIAGNOSIS — E03.9 ACQUIRED HYPOTHYROIDISM: ICD-10-CM

## 2020-08-23 DIAGNOSIS — I10 ESSENTIAL HYPERTENSION: ICD-10-CM

## 2020-08-24 RX ORDER — LOSARTAN POTASSIUM AND HYDROCHLOROTHIAZIDE 12.5; 5 MG/1; MG/1
TABLET ORAL
Qty: 60 TAB | Refills: 2 | Status: SHIPPED | OUTPATIENT
Start: 2020-08-24 | End: 2020-11-17

## 2020-08-24 RX ORDER — LEVOTHYROXINE SODIUM 50 UG/1
TABLET ORAL
Qty: 30 TAB | Refills: 0 | Status: SHIPPED | OUTPATIENT
Start: 2020-08-24 | End: 2020-09-25

## 2020-08-27 ENCOUNTER — OFFICE VISIT (OUTPATIENT)
Dept: INTERNAL MEDICINE CLINIC | Age: 75
End: 2020-08-27
Payer: MEDICARE

## 2020-08-27 VITALS
HEART RATE: 61 BPM | BODY MASS INDEX: 36.7 KG/M2 | RESPIRATION RATE: 12 BRPM | TEMPERATURE: 98.5 F | SYSTOLIC BLOOD PRESSURE: 148 MMHG | DIASTOLIC BLOOD PRESSURE: 84 MMHG | WEIGHT: 199.4 LBS | OXYGEN SATURATION: 96 % | HEIGHT: 62 IN

## 2020-08-27 DIAGNOSIS — I10 ESSENTIAL HYPERTENSION: Primary | ICD-10-CM

## 2020-08-27 DIAGNOSIS — Z12.39 BREAST SCREENING: ICD-10-CM

## 2020-08-27 DIAGNOSIS — E11.9 CONTROLLED TYPE 2 DIABETES MELLITUS WITHOUT COMPLICATION, WITHOUT LONG-TERM CURRENT USE OF INSULIN (HCC): ICD-10-CM

## 2020-08-27 DIAGNOSIS — R19.5 LOOSE STOOLS: ICD-10-CM

## 2020-08-27 PROBLEM — N39.41 URGE INCONTINENCE OF URINE: Status: ACTIVE | Noted: 2020-02-10

## 2020-08-27 PROBLEM — N39.42 INCONTINENCE WITHOUT SENSORY AWARENESS: Status: ACTIVE | Noted: 2020-04-08

## 2020-08-27 PROBLEM — N39.3 FEMALE STRESS INCONTINENCE: Status: ACTIVE | Noted: 2020-02-10

## 2020-08-27 LAB
ALBUMIN SERPL-MCNC: 4 G/DL (ref 3.5–5)
ALBUMIN/GLOB SERPL: 1.3 {RATIO} (ref 1.1–2.2)
ALP SERPL-CCNC: 78 U/L (ref 45–117)
ALT SERPL-CCNC: 32 U/L (ref 12–78)
ANION GAP SERPL CALC-SCNC: 6 MMOL/L (ref 5–15)
AST SERPL-CCNC: 22 U/L (ref 15–37)
BILIRUB SERPL-MCNC: 0.6 MG/DL (ref 0.2–1)
BUN SERPL-MCNC: 27 MG/DL (ref 6–20)
BUN/CREAT SERPL: 26 (ref 12–20)
CALCIUM SERPL-MCNC: 9.4 MG/DL (ref 8.5–10.1)
CHLORIDE SERPL-SCNC: 104 MMOL/L (ref 97–108)
CO2 SERPL-SCNC: 27 MMOL/L (ref 21–32)
CREAT SERPL-MCNC: 1.03 MG/DL (ref 0.55–1.02)
EST. AVERAGE GLUCOSE BLD GHB EST-MCNC: 171 MG/DL
GLOBULIN SER CALC-MCNC: 3.1 G/DL (ref 2–4)
GLUCOSE SERPL-MCNC: 132 MG/DL (ref 65–100)
HBA1C MFR BLD: 7.6 % (ref 4–5.6)
MAGNESIUM SERPL-MCNC: 1.8 MG/DL (ref 1.6–2.4)
POTASSIUM SERPL-SCNC: 3.8 MMOL/L (ref 3.5–5.1)
PROT SERPL-MCNC: 7.1 G/DL (ref 6.4–8.2)
SODIUM SERPL-SCNC: 137 MMOL/L (ref 136–145)

## 2020-08-27 PROCEDURE — G8754 DIAS BP LESS 90: HCPCS | Performed by: INTERNAL MEDICINE

## 2020-08-27 PROCEDURE — 1101F PT FALLS ASSESS-DOCD LE1/YR: CPT | Performed by: INTERNAL MEDICINE

## 2020-08-27 PROCEDURE — 2022F DILAT RTA XM EVC RTNOPTHY: CPT | Performed by: INTERNAL MEDICINE

## 2020-08-27 PROCEDURE — 1090F PRES/ABSN URINE INCON ASSESS: CPT | Performed by: INTERNAL MEDICINE

## 2020-08-27 PROCEDURE — G8536 NO DOC ELDER MAL SCRN: HCPCS | Performed by: INTERNAL MEDICINE

## 2020-08-27 PROCEDURE — G8427 DOCREV CUR MEDS BY ELIG CLIN: HCPCS | Performed by: INTERNAL MEDICINE

## 2020-08-27 PROCEDURE — G8417 CALC BMI ABV UP PARAM F/U: HCPCS | Performed by: INTERNAL MEDICINE

## 2020-08-27 PROCEDURE — G0463 HOSPITAL OUTPT CLINIC VISIT: HCPCS | Performed by: INTERNAL MEDICINE

## 2020-08-27 PROCEDURE — 99214 OFFICE O/P EST MOD 30 MIN: CPT | Performed by: INTERNAL MEDICINE

## 2020-08-27 PROCEDURE — G8400 PT W/DXA NO RESULTS DOC: HCPCS | Performed by: INTERNAL MEDICINE

## 2020-08-27 PROCEDURE — G8432 DEP SCR NOT DOC, RNG: HCPCS | Performed by: INTERNAL MEDICINE

## 2020-08-27 PROCEDURE — 3017F COLORECTAL CA SCREEN DOC REV: CPT | Performed by: INTERNAL MEDICINE

## 2020-08-27 PROCEDURE — G8753 SYS BP > OR = 140: HCPCS | Performed by: INTERNAL MEDICINE

## 2020-08-27 NOTE — PROGRESS NOTES
Consent: Kerri Guevara, who was seen by synchronous (real-time) audio-video technology, and/or her healthcare decision maker, is aware that this patient-initiated, Telehealth encounter on 8/27/2020 is a billable service, with coverage as determined by her insurance carrier. She is aware that she may receive a bill and has provided verbal consent to proceed: YES      712  Assessment & Plan:   Diagnoses and all orders for this visit:  Diagnoses and all orders for this visit:    1. Breast screening  -     [facility]RIS MAMMO BI SCREENING INCL CAD; Future    2. Loose stools  Patient is taking magnesium twice a day. She has a history of hypomagnesemia  Will not stop magnesium unless level is elevated  -     METABOLIC PANEL, COMPREHENSIVE; Future  -     MAGNESIUM; Future    3. Controlled type 2 diabetes mellitus without complication, without long-term current use of insulin (HCC)  Continue on Trulicity  Nausea is subsiding  Discussed other GLP-1's but will defer to endocrinology  -     HEMOGLOBIN A1C WITH EAG; Future    Hypertension  Continue on metoprolol 25 mg extended release  Blood pressure on recheck was 138/78  She is on max dose of losartan hydrochlorothiazide  Blood pressure is consistently greater than 140/80 consider going back on the 50 mg but defer this to cardiology. Subjective:   Kerri Guevara is a 76 y.o. female who was seen for Hypertension (fasting for labs ) and Diabetes      Patient presents with her  today.  has questions about patient's metoprolol. He also has questions with regards to her nausea related to her possible GLP-1. Fatigue  Slightly better on lower dose of metoprolol  Not falling asleep during the day  She has been on metoprolol 25 mg twice a day since her last cardiology visit for a week. .  Her dose was decreased from 50-25 due to fatigue. She notes her fatigue is may be slightly better.   She has not been checking her blood pressure regularly at home      Diabetes  Patient has been seen by Dr. Sivan Pérez. She was transitioned to Trulicity 2.65. She tolerated this for 1 month and then transition to 1.5 mg. Patient notes that she has been having some nausea associated with this. She notes it is improving. She has lost some weight. No constipation, loose stools  Patient was last seen by endocrinology 6 weeks ago. Dr. Lin Watkins + cataract, no diabetic retinopathy, Dec or Jan (opthal)  Podiatry is seen annually also  [facility]cilia [facility]. Patient's last hemoglobin A1c was 7.7 this month in August.       Rosalia Larios is a 76 y.o. female with hypertension. At home blood pressure is 126/62 and heart rate 54-56  Hypertension ROS: taking medications as instructed, no medication side effects noted, no TIA's, no chest pain on exertion, no dyspnea on exertion, no swelling of ankles. New concerns she is tolerating her losartan 50/12.5 x2. She denies lightheaded or dizziness. She denies edema. Her blood pressure at home is running about 126/62 however after initially getting into the office blood pressure was running in the 140s range. She is using her CPAP machine. Memory issues  Did not discuss    CAD  crestor no muscle aches or pains  Patient is getting labs done with Dr. Sivan Pérez  Patient has transitioned her cardiac care to Dr. Rukhsnaa Quispe. He has switched her simvastatin to Crestor and increased her metoprolol. She has been compliant with these changes. She denies chest pain or shortness of breath. She has a follow-up with him in 6 months. She was having some epigastric pain. She has a follow-up with GI in 4 days. Dr. Kamaljit García recommended more medical management. She will look into weight watchers and a CAC.     Urology  No complaints    Past Medical History:   Diagnosis Date    Arthritis     CAD (coronary artery disease) 2006    STENT PLACED>PLAQUE BROKE OFF--HAD \"MINOR MI\"    Deviated septum 12/20/13    HAS TROUBLE BREATHING THROUGH LEFT SIDE; CT SHOWED POLYP    Diabetes ([facility] [address]     Heart disease     Hypertension     Incontinence     Nasal polyp     Unspecified sleep apnea 2013    cpap     Past Surgical History:   Procedure Laterality Date    CARDIAC SURG PROCEDURE UNLIST      CARDIAC CATH;ANGIOPLASTY WITH STENT    HX  SECTION      X3    HX COLONOSCOPY  2011    HX HEENT  ? SEPTOPLASTY     Social History     Socioeconomic History    Marital status:      Spouse name: Not on file    Number of children: Not on file    Years of education: Not on file    Highest education level: Not on file   Tobacco Use    Smoking status: Former Smoker     Packs/day: 1.00     Years: 5.00     Pack years: 5.00     Last attempt to quit: 1970     Years since quittin.7    Smokeless tobacco: Never Used   Substance and Sexual Activity    Alcohol use: Yes     Comment: \"VERY LITTLE ALCOHOL\"    Drug use: No   Social History Narrative            3 children women 36, 40, 40 healthy other than skin issues     Family History   Problem Relation Age of Onset    Heart Disease Mother     Heart Surgery Mother         VALVE REPLACED AT AGE 72    Heart Attack Father 46        MASSIVE     Anesth Problems Neg Hx      Current Outpatient Medications   Medication Sig Dispense Refill    levothyroxine (SYNTHROID) 50 mcg tablet TAKE 1 TABLET BY MOUTH EVERY DAY 30 Tab 0    losartan-hydroCHLOROthiazide (HYZAAR) 50-12.5 mg per tablet TAKE 1 TABLET BY MOUTH TWICE A DAY 60 Tab 2    Trulicity 1.5 IY/8.4 mL sub-q pen 1.5 mg by SubCUTAneous route.  metoprolol tartrate (LOPRESSOR) 25 mg tablet TAKE 1 TABLET BY MOUTH TWICE A  Tab 3    rosuvastatin (CRESTOR) 20 mg tablet TAKE 1 TABLET BY MOUTH EVERY DAY AT BEDTIME 90 Tab 1    sertraline (ZOLOFT) 100 mg tablet TAKE 1 TABLET BY MOUTH EVERY DAY IN THE EVENING 90 Tab 0    cyanocobalamin 1,000 mcg tablet Take 1,000 mcg by mouth daily.  mirabegron ER (MYRBETRIQ) 50 mg ER tablet Take 50 mg by mouth daily.  ACCU-CHEK COMPACT PLUS TEST strp USE TO TEST BLOOD SUGAR 4 TIMES DAILY. DX E11.9 357 Strip 3    magnesium oxide (MAG-OX) 400 mg tablet TAKE 1 TAB BY MOUTH TWO (2) TIMES A DAY. 240 Tab 1    Blood Sugar Diagnostic, Drum (ACCU-CHEK COMPACT PLUS TEST) strp USE TO TEST BLOOD SUGAR 4 TIMES DAILY. DX E11.9 357 Strip 0    metFORMIN ER (GLUCOPHAGE XR) 500 mg tablet TAKE 4 TABLETS BY MOUTH EVERY DAY  3    insulin glargine (LANTUS) 100 unit/mL injection 20 Units by SubCUTAneous route two (2) times a day. (Patient taking differently: 44 Units by SubCUTAneous route daily. Sliding scale) 1 Vial 3    HUMALOG KWIKPEN 100 unit/mL kwikpen once. 30 units nightly  3    Cholecalciferol, Vitamin D3, (VITAMIN D3) 1,000 unit cap Take 1 Cap by mouth nightly. 30 Cap 3    Aspirin, Buffered 81 mg tab Take  by mouth daily.  omega-3 acid ethyl esters (LOVAZA) 1 gram capsule Take 1 g by mouth daily. Allergies   Allergen Reactions    Adhesive Rash and Itching    Advil [Ibuprofen] Swelling     FEET SWELL    Ceftin [Cefuroxime Axetil] Itching    Lotensin [Benazepril] Cough    Penicillins Swelling    Sulfa (Sulfonamide Antibiotics) Rash, Itching and Swelling       Review of Systems - General ROS: negative for - chills or fever  Cardiovascular ROS: no chest pain or dyspnea on exertion  Respiratory ROS: no cough, shortness of breath, or wheezing    Visit Vitals  /84 (BP 1 Location: Left arm, BP Patient Position: Sitting)   Pulse 61   Temp 98.5 °F (36.9 °C) (Oral)   Resp 12   Ht 5' 2\" (1.575 m)   Wt 199 lb 6.4 oz (90.4 kg)   SpO2 96%   BMI 36.47 kg/m²     General Appearance:  Well developed, well nourished,alert and oriented x 3, and individual in no acute distress. Ears/Nose/Mouth/Throat:   Hearing grossly normal.         Neck: Supple, no lad, no bruits   Chest:   Lungs clear to auscultation bilaterally. Cardiovascular:  Regular rate and rhythm, S1, S2 normal, no murmur.    Abdomen:   Soft, non-tender, bowel sounds are active. Extremities: No edema bilaterally.     Skin: Warm and dry, no suspicious lesions

## 2020-09-02 ENCOUNTER — TELEPHONE (OUTPATIENT)
Dept: INTERNAL MEDICINE CLINIC | Age: 75
End: 2020-09-02

## 2020-09-02 NOTE — TELEPHONE ENCOUNTER
Per PCP's request, faxed most recent lab results to Dr. Juancho Mac (Endocrinology). Fax result report showed successful transmission.

## 2020-09-02 NOTE — TELEPHONE ENCOUNTER
----- Message from Nitin Mendoza MD sent at 8/28/2020 11:04 PM EDT -----  Please fax results to Dr. Neeru Hernández.

## 2020-09-16 RX ORDER — SERTRALINE HYDROCHLORIDE 100 MG/1
TABLET, FILM COATED ORAL
Qty: 90 TAB | Refills: 0 | Status: SHIPPED | OUTPATIENT
Start: 2020-09-16 | End: 2020-09-25

## 2020-09-23 DIAGNOSIS — E03.9 ACQUIRED HYPOTHYROIDISM: ICD-10-CM

## 2020-09-25 RX ORDER — LEVOTHYROXINE SODIUM 50 UG/1
TABLET ORAL
Qty: 30 TAB | Refills: 0 | Status: SHIPPED | OUTPATIENT
Start: 2020-09-25 | End: 2020-12-21

## 2020-09-25 RX ORDER — SERTRALINE HYDROCHLORIDE 100 MG/1
TABLET, FILM COATED ORAL
Qty: 90 TAB | Refills: 0 | Status: SHIPPED | OUTPATIENT
Start: 2020-09-25 | End: 2021-03-23

## 2020-11-12 ENCOUNTER — VIRTUAL VISIT (OUTPATIENT)
Dept: INTERNAL MEDICINE CLINIC | Age: 75
End: 2020-11-12
Payer: MEDICARE

## 2020-11-12 DIAGNOSIS — R05.9 COUGH: Primary | ICD-10-CM

## 2020-11-12 PROCEDURE — G8432 DEP SCR NOT DOC, RNG: HCPCS | Performed by: INTERNAL MEDICINE

## 2020-11-12 PROCEDURE — G8400 PT W/DXA NO RESULTS DOC: HCPCS | Performed by: INTERNAL MEDICINE

## 2020-11-12 PROCEDURE — G8756 NO BP MEASURE DOC: HCPCS | Performed by: INTERNAL MEDICINE

## 2020-11-12 PROCEDURE — 1101F PT FALLS ASSESS-DOCD LE1/YR: CPT | Performed by: INTERNAL MEDICINE

## 2020-11-12 PROCEDURE — G8536 NO DOC ELDER MAL SCRN: HCPCS | Performed by: INTERNAL MEDICINE

## 2020-11-12 PROCEDURE — 1090F PRES/ABSN URINE INCON ASSESS: CPT | Performed by: INTERNAL MEDICINE

## 2020-11-12 PROCEDURE — G0463 HOSPITAL OUTPT CLINIC VISIT: HCPCS | Performed by: INTERNAL MEDICINE

## 2020-11-12 PROCEDURE — G8428 CUR MEDS NOT DOCUMENT: HCPCS | Performed by: INTERNAL MEDICINE

## 2020-11-12 PROCEDURE — 99213 OFFICE O/P EST LOW 20 MIN: CPT | Performed by: INTERNAL MEDICINE

## 2020-11-12 PROCEDURE — 3017F COLORECTAL CA SCREEN DOC REV: CPT | Performed by: INTERNAL MEDICINE

## 2020-11-12 PROCEDURE — G8417 CALC BMI ABV UP PARAM F/U: HCPCS | Performed by: INTERNAL MEDICINE

## 2020-11-12 RX ORDER — AZITHROMYCIN 250 MG/1
TABLET, FILM COATED ORAL
Qty: 6 TAB | Refills: 0 | Status: SHIPPED | OUTPATIENT
Start: 2020-11-12 | End: 2020-11-17

## 2020-11-12 NOTE — PROGRESS NOTES
Consent: David Celestin, who was seen by synchronous (real-time) audio-video technology, and/or her healthcare decision maker, is aware that this patient-initiated, Telehealth encounter on 11/12/2020 is a billable service, with coverage as determined by her insurance carrier. She is aware that she may receive a bill and has provided verbal consent to proceed: Yes. I was in the office while conducting this encounter. This visit was done with doxy. me    Assessment and Plan   Diagnoses and all orders for this visit:    1. Cough    Other orders  -     azithromycin (ZITHROMAX) 250 mg tablet; Take 2 Tabs by mouth daily for 1 day, THEN 1 Tab daily for 4 days. Onset 2 days ago. Reports malaise, fatigue, diarrhea, cough, pain with coughing, temperature up to 100.5 yesterday, muscle aches, mild abdominal pain, sinus congestion and drainage, postnasal drip. Denies any chills, shortness of breath, vomiting, sore throat. Recommend getting COVID-19 and flu testing at urgent care. Symptoms most likely viral but given her age, comorbidities, and time until we get results, recommend antibiotics. Can use over-the-counter cough medication. Tylenol if needed for pain and fever. Advised to call if symptoms worsen or do not improve      We discussed the expected course, resolution and complications of the diagnosis(es) in detail. Medication risks, benefits, costs, interactions, and alternatives were discussed as indicated. I advised her to contact the office if her condition worsens, changes or fails to improve as anticipated. She expressed understanding with the diagnosis(es) and plan.      Return to clinic: As needed    Ashley Lake MD  [facility]  11/12/2020    Future Appointments   Date Time Provider Olesya Dunbar   1/6/2021 10:30 AM SPT CT 1 SPTRCT SPT   1/13/2021 10:40 AM Nadeem Almaguer NP QUYEN [facility] BS AMB   8/17/2021  1:00 PM MD JOHN Gibson AMB Subjective   Chief Complaint   Cough    Jerome Gutierrez is a 76 y.o. female         Review of Systems   Constitutional: Positive for fever and malaise/fatigue. Negative for chills. Respiratory: Negative for shortness of breath. Cardiovascular: Negative for chest pain. Objective   Vitals:       Patient-Reported Vitals 11/12/2020   Patient-Reported Weight 199lb   Patient-Reported Height -   Patient-Reported Pulse -   Patient-Reported Temperature 98.5   Patient-Reported Systolic  -   Patient-Reported Diastolic -                 Physical Exam  Constitutional:       Comments: Appears mildly uncomfortable   HENT:      Head: Normocephalic and atraumatic. Right Ear: External ear normal.      Left Ear: External ear normal.      Nose:      Comments: Voice nasally  Eyes:      Extraocular Movements: Extraocular movements intact. Neck:      Musculoskeletal: Normal range of motion. Pulmonary:      Effort: Pulmonary effort is normal. No respiratory distress. Musculoskeletal:      Comments: Able to hold phone without issue   Skin:     Comments: No obvious facial rashes or abnormalities   Neurological:      Mental Status: She is alert and oriented to person, place, and time. Comments: No obvious focal deficit   Psychiatric:         Mood and Affect: Mood normal.         Thought Content: Thought content normal.         Judgment: Judgment normal.          Current Outpatient Medications   Medication Sig    azithromycin (ZITHROMAX) 250 mg tablet Take 2 Tabs by mouth daily for 1 day, THEN 1 Tab daily for 4 days.  sertraline (ZOLOFT) 100 mg tablet TAKE 1 TABLET BY MOUTH EVERY DAY IN THE EVENING    levothyroxine (SYNTHROID) 50 mcg tablet TAKE 1 TABLET BY MOUTH EVERY DAY    losartan-hydroCHLOROthiazide (HYZAAR) 50-12.5 mg per tablet TAKE 1 TABLET BY MOUTH TWICE A DAY    Trulicity 1.5 ZQ/7.9 mL sub-q pen 1.5 mg by SubCUTAneous route.     metoprolol tartrate (LOPRESSOR) 25 mg tablet TAKE 1 TABLET BY MOUTH TWICE A DAY    rosuvastatin (CRESTOR) 20 mg tablet TAKE 1 TABLET BY MOUTH EVERY DAY AT BEDTIME    cyanocobalamin 1,000 mcg tablet Take 1,000 mcg by mouth daily.  mirabegron ER (MYRBETRIQ) 50 mg ER tablet Take 50 mg by mouth daily.  ACCU-CHEK COMPACT PLUS TEST strp USE TO TEST BLOOD SUGAR 4 TIMES DAILY. DX E11.9    magnesium oxide (MAG-OX) 400 mg tablet TAKE 1 TAB BY MOUTH TWO (2) TIMES A DAY.  Blood Sugar Diagnostic, Drum (ACCU-CHEK COMPACT PLUS TEST) strp USE TO TEST BLOOD SUGAR 4 TIMES DAILY. DX E11.9    metFORMIN ER (GLUCOPHAGE XR) 500 mg tablet TAKE 4 TABLETS BY MOUTH EVERY DAY    insulin glargine (LANTUS) 100 unit/mL injection 20 Units by SubCUTAneous route two (2) times a day. (Patient taking differently: 44 Units by SubCUTAneous route daily. Sliding scale)    HUMALOG KWIKPEN 100 unit/mL kwikpen once. 30 units nightly    Cholecalciferol, Vitamin D3, (VITAMIN D3) 1,000 unit cap Take 1 Cap by mouth nightly.  Aspirin, Buffered 81 mg tab Take  by mouth daily.  omega-3 acid ethyl esters (LOVAZA) 1 gram capsule Take 1 g by mouth daily. No current facility-administered medications for this visit. Sheralyn Leyden is a 76 y.o. female being evaluated by a video visit encounter for concerns as above. A caregiver was present when appropriate. Due to this being a TeleHealth encounter (During FJPIL-66 public health emergency), evaluation of the following organ systems was limited: Vitals/Constitutional/EENT/Resp/CV/GI//MS/Neuro/Skin/Heme-Lymph-Imm. Pursuant to the emergency declaration under the [facility] waiver authority and the [employer] Act, this Virtual  Visit was conducted, with patient's (and/or legal guardian's) consent, to reduce the patient's risk of exposure to COVID-19 and provide necessary medical care.      Services were provided through a video synchronous discussion virtually to substitute for in-person clinic visit.

## 2020-11-17 DIAGNOSIS — I10 ESSENTIAL HYPERTENSION: ICD-10-CM

## 2020-11-17 RX ORDER — LOSARTAN POTASSIUM AND HYDROCHLOROTHIAZIDE 12.5; 5 MG/1; MG/1
TABLET ORAL
Qty: 180 TAB | Refills: 0 | Status: SHIPPED | OUTPATIENT
Start: 2020-11-17 | End: 2021-03-01

## 2020-11-18 ENCOUNTER — HOSPITAL ENCOUNTER (EMERGENCY)
Age: 75
Discharge: HOME | End: 2020-11-19
Attending: EMERGENCY MEDICINE
Payer: MEDICARE

## 2020-11-18 ENCOUNTER — APPOINTMENT (OUTPATIENT)
Dept: GENERAL RADIOLOGY | Age: 75
End: 2020-11-18
Attending: PHYSICIAN ASSISTANT
Payer: MEDICARE

## 2020-11-18 VITALS
WEIGHT: 200 LBS | SYSTOLIC BLOOD PRESSURE: 115 MMHG | BODY MASS INDEX: 36.8 KG/M2 | OXYGEN SATURATION: 97 % | HEART RATE: 73 BPM | RESPIRATION RATE: 20 BRPM | DIASTOLIC BLOOD PRESSURE: 63 MMHG | HEIGHT: 62 IN | TEMPERATURE: 97.7 F

## 2020-11-18 DIAGNOSIS — U07.1 COVID-19: Primary | ICD-10-CM

## 2020-11-18 LAB
ALBUMIN SERPL-MCNC: 3.6 G/DL (ref 3.5–5)
ALBUMIN/GLOB SERPL: 0.8 {RATIO} (ref 1.1–2.2)
ALP SERPL-CCNC: 77 U/L (ref 45–117)
ALT SERPL-CCNC: 38 U/L (ref 12–78)
ANION GAP SERPL CALC-SCNC: 8 MMOL/L (ref 5–15)
APTT PPP: 22.9 SEC (ref 22.1–31)
AST SERPL-CCNC: 36 U/L (ref 15–37)
BASOPHILS # BLD: 0 K/UL (ref 0–0.1)
BASOPHILS NFR BLD: 0 % (ref 0–1)
BILIRUB SERPL-MCNC: 0.9 MG/DL (ref 0.2–1)
BUN SERPL-MCNC: 19 MG/DL (ref 6–20)
BUN/CREAT SERPL: 17 (ref 12–20)
CALCIUM SERPL-MCNC: 8.8 MG/DL (ref 8.5–10.1)
CHLORIDE SERPL-SCNC: 101 MMOL/L (ref 97–108)
CO2 SERPL-SCNC: 24 MMOL/L (ref 21–32)
CREAT SERPL-MCNC: 1.13 MG/DL (ref 0.55–1.02)
CRP SERPL-MCNC: 4.67 MG/DL (ref 0–0.6)
D DIMER PPP FEU-MCNC: 0.41 MG/L FEU (ref 0–0.65)
DIFFERENTIAL METHOD BLD: ABNORMAL
EOSINOPHIL # BLD: 0 K/UL (ref 0–0.4)
EOSINOPHIL NFR BLD: 0 % (ref 0–7)
ERYTHROCYTE [DISTWIDTH] IN BLOOD BY AUTOMATED COUNT: 12.7 % (ref 11.5–14.5)
FERRITIN SERPL-MCNC: 289 NG/ML (ref 26–388)
FIBRINOGEN PPP-MCNC: 562 MG/DL (ref 200–475)
GLOBULIN SER CALC-MCNC: 4.4 G/DL (ref 2–4)
GLUCOSE SERPL-MCNC: 266 MG/DL (ref 65–100)
HCT VFR BLD AUTO: 36 % (ref 35–47)
HGB BLD-MCNC: 12.6 G/DL (ref 11.5–16)
IMM GRANULOCYTES # BLD AUTO: 0 K/UL (ref 0–0.04)
IMM GRANULOCYTES NFR BLD AUTO: 0 % (ref 0–0.5)
INR PPP: 1 (ref 0.9–1.1)
LACTATE SERPL-SCNC: 1.3 MMOL/L (ref 0.4–2)
LDH SERPL L TO P-CCNC: 292 U/L (ref 81–246)
LYMPHOCYTES # BLD: 0.6 K/UL (ref 0.8–3.5)
LYMPHOCYTES NFR BLD: 10 % (ref 12–49)
MAGNESIUM SERPL-MCNC: 1.8 MG/DL (ref 1.6–2.4)
MCH RBC QN AUTO: 31 PG (ref 26–34)
MCHC RBC AUTO-ENTMCNC: 35 G/DL (ref 30–36.5)
MCV RBC AUTO: 88.5 FL (ref 80–99)
MONOCYTES # BLD: 0.3 K/UL (ref 0–1)
MONOCYTES NFR BLD: 5 % (ref 5–13)
NEUTS SEG # BLD: 5.3 K/UL (ref 1.8–8)
NEUTS SEG NFR BLD: 85 % (ref 32–75)
NRBC # BLD: 0 K/UL (ref 0–0.01)
NRBC BLD-RTO: 0 PER 100 WBC
PLATELET # BLD AUTO: 185 K/UL (ref 150–400)
PMV BLD AUTO: 10.7 FL (ref 8.9–12.9)
POTASSIUM SERPL-SCNC: 4.1 MMOL/L (ref 3.5–5.1)
PROCALCITONIN SERPL-MCNC: 0.06 NG/ML
PROT SERPL-MCNC: 8 G/DL (ref 6.4–8.2)
PROTHROMBIN TIME: 10.7 SEC (ref 9–11.1)
RBC # BLD AUTO: 4.07 M/UL (ref 3.8–5.2)
RBC MORPH BLD: ABNORMAL
SODIUM SERPL-SCNC: 133 MMOL/L (ref 136–145)
THERAPEUTIC RANGE,PTTT: NORMAL SECS (ref 58–77)
TROPONIN I SERPL-MCNC: <0.05 NG/ML
WBC # BLD AUTO: 6.2 K/UL (ref 3.6–11)

## 2020-11-18 PROCEDURE — 85025 COMPLETE CBC W/AUTO DIFF WBC: CPT

## 2020-11-18 PROCEDURE — 85610 PROTHROMBIN TIME: CPT

## 2020-11-18 PROCEDURE — 86140 C-REACTIVE PROTEIN: CPT

## 2020-11-18 PROCEDURE — 80053 COMPREHEN METABOLIC PANEL: CPT

## 2020-11-18 PROCEDURE — 85379 FIBRIN DEGRADATION QUANT: CPT

## 2020-11-18 PROCEDURE — 84145 PROCALCITONIN (PCT): CPT

## 2020-11-18 PROCEDURE — 99284 EMERGENCY DEPT VISIT MOD MDM: CPT

## 2020-11-18 PROCEDURE — 71045 X-RAY EXAM CHEST 1 VIEW: CPT

## 2020-11-18 PROCEDURE — 83615 LACTATE (LD) (LDH) ENZYME: CPT

## 2020-11-18 PROCEDURE — 84484 ASSAY OF TROPONIN QUANT: CPT

## 2020-11-18 PROCEDURE — 85384 FIBRINOGEN ACTIVITY: CPT

## 2020-11-18 PROCEDURE — 83605 ASSAY OF LACTIC ACID: CPT

## 2020-11-18 PROCEDURE — 85730 THROMBOPLASTIN TIME PARTIAL: CPT

## 2020-11-18 PROCEDURE — 36415 COLL VENOUS BLD VENIPUNCTURE: CPT

## 2020-11-18 PROCEDURE — 93005 ELECTROCARDIOGRAM TRACING: CPT

## 2020-11-18 PROCEDURE — 83735 ASSAY OF MAGNESIUM: CPT

## 2020-11-18 PROCEDURE — 82728 ASSAY OF FERRITIN: CPT

## 2020-11-18 NOTE — ED PROVIDER NOTES
76year old female presenting to the ED for SOB. Pt reports that she has had fatigue, anorexia, SOB, diffuse myalgia, BERNARD. Today at home felt winded walking around. States that her daughters are in [place], were concerned about her so they had home health come and evaluate her, found to have ambulatory sats in the 80's. Notes about 4 days of symptoms. Denies CP, fever, hemoptysis, leg swelling, abdominal pain, vomiting.  + diarrhea.  + sick contacts -  sick as well. PMHx: DM, CAD, HTN  PMHx: denies tobacco, alcohol, or drug use. . Past Medical History:   Diagnosis Date    Arthritis     CAD (coronary artery disease)     STENT PLACED>PLAQUE BROKE OFF--HAD \"MINOR MI\"    Deviated septum 13    HAS TROUBLE BREATHING THROUGH LEFT SIDE; CT SHOWED POLYP    Diabetes ([address]     Heart disease     Hypertension     Incontinence     Nasal polyp     Unspecified sleep apnea 2013    cpap       Past Surgical History:   Procedure Laterality Date    CARDIAC SURG PROCEDURE UNLIST      CARDIAC CATH;ANGIOPLASTY WITH STENT    HX  SECTION      X3    HX COLONOSCOPY  2011    HX HEENT  ?     SEPTOPLASTY         Family History:   Problem Relation Age of Onset    Heart Disease Mother     Heart Surgery Mother         VALVE REPLACED AT AGE 72    Heart Attack Father 46        MASSIVE     Anesth Problems Neg Hx        Social History     Socioeconomic History    Marital status:      Spouse name: Not on file    Number of children: Not on file    Years of education: Not on file    Highest education level: Not on file   Occupational History    Not on file   Social Needs    Financial resource strain: Not on file    Food insecurity     Worry: Not on file     Inability: Not on file    Transportation needs     Medical: Not on file     Non-medical: Not on file   Tobacco Use    Smoking status: Former Smoker     Packs/day: 1.00     Years: 5.00     Pack years: 5.00 Last attempt to quit: 1970     Years since quittin.9    Smokeless tobacco: Never Used   Substance and Sexual Activity    Alcohol use: Yes     Comment: \"VERY LITTLE ALCOHOL\"    Drug use: No    Sexual activity: Not on file   Lifestyle    Physical activity     Days per week: Not on file     Minutes per session: Not on file    Stress: Not on file   Relationships    Social connections     Talks on phone: Not on file     Gets together: Not on file     Attends Taoist service: Not on file     Active member of club or organization: Not on file     Attends meetings of clubs or organizations: Not on file     Relationship status: Not on file    Intimate partner violence     Fear of current or ex partner: Not on file     Emotionally abused: Not on file     Physically abused: Not on file     Forced sexual activity: Not on file   Other Topics Concern    Not on file   Social History Narrative            3 children women 36, 40, 40 healthy other than skin issues         ALLERGIES: Adhesive; Advil [ibuprofen]; Ceftin [cefuroxime axetil]; Lotensin [benazepril]; Penicillins; and Sulfa (sulfonamide antibiotics)    Review of Systems   Constitutional: Positive for appetite change and fatigue. Negative for fever. HENT: Negative for congestion. Eyes: Negative for discharge and redness. Respiratory: Positive for shortness of breath. Negative for cough. Cardiovascular: Negative for chest pain and leg swelling. Gastrointestinal: Positive for diarrhea. Negative for vomiting. Genitourinary: Negative for difficulty urinating. Musculoskeletal: Positive for myalgias. Negative for joint swelling. Skin: Negative for wound. Neurological: Negative for syncope and headaches. Psychiatric/Behavioral: Negative for behavioral problems. All other systems reviewed and are negative.       Vitals:    20 1810   BP: 122/68   Pulse: 72   Resp: 20   Temp: 98.2 °F (36.8 °C)   SpO2: 94%   Weight: 90.7 kg (200 lb) Height: 5' 2\" (1.575 m)            Physical Exam  Vitals signs and nursing note reviewed. Constitutional:       General: She is not in acute distress. Appearance: She is well-developed. Comments: Pleasant, elderly WF, appears to not feel well   HENT:      Head: Normocephalic and atraumatic. Right Ear: External ear normal.      Left Ear: External ear normal.   Eyes:      General: No scleral icterus. Conjunctiva/sclera: Conjunctivae normal.   Neck:      Musculoskeletal: Neck supple. Trachea: No tracheal deviation. Cardiovascular:      Rate and Rhythm: Normal rate and regular rhythm. Heart sounds: Normal heart sounds. No murmur. No friction rub. No gallop. Pulmonary:      Effort: Pulmonary effort is normal. No respiratory distress. Breath sounds: Normal breath sounds. No stridor. No wheezing. Abdominal:      General: There is no distension. Palpations: Abdomen is soft. Musculoskeletal: Normal range of motion. Skin:     General: Skin is warm and dry. Neurological:      Mental Status: She is alert and oriented to person, place, and time. Psychiatric:         Behavior: Behavior normal.          MDM  Number of Diagnoses or Management Options  COVID-19:   Diagnosis management comments: 76year old female known COVID presenting for low sats at home, fatigue, anorexia, general malaise. 94% on RA upon arrival.  Will check COVID labs, CXR, ambulatory sats in the ED. Overall reassuring work-up in the ED, patient 94% while walking around the department. No acute findings on chest x-ray, some mild elevation of inflammatory markers, negative D-dimer. Had lengthy, lengthy discussion with patient about nature of illness, return precautions, having a low threshold to return to the ED. Patient voiced understanding. Also discussed care plan with daughter over the phone.            Amount and/or Complexity of Data Reviewed  Clinical lab tests: ordered and reviewed  Tests in the radiology section of CPT®: ordered and reviewed  Discuss the patient with other providers: yes (Dr. Luis Alberto Agarwal ED attending)           Procedures

## 2020-11-18 NOTE — ED TRIAGE NOTES
Triage:  Pt to the ED due to concerns over recent positive Covid test. Pt states they were seen at home by home health services and informed that their oxygen saturations drop with ambulation. EMS states stable transport with sats in the 90's, on arrival Pt A/O x 4 with oxygen sats at 94%. Pt also states cough, gen malaise, nausea, decreased appetite.

## 2020-11-19 ENCOUNTER — PATIENT OUTREACH (OUTPATIENT)
Dept: CASE MANAGEMENT | Age: 75
End: 2020-11-19

## 2020-11-19 ENCOUNTER — TELEPHONE (OUTPATIENT)
Dept: INTERNAL MEDICINE CLINIC | Age: 75
End: 2020-11-19

## 2020-11-19 LAB
ATRIAL RATE: 74 BPM
CALCULATED P AXIS, ECG09: 55 DEGREES
CALCULATED R AXIS, ECG10: 71 DEGREES
CALCULATED T AXIS, ECG11: 40 DEGREES
DIAGNOSIS, 93000: NORMAL
P-R INTERVAL, ECG05: 178 MS
Q-T INTERVAL, ECG07: 414 MS
QRS DURATION, ECG06: 74 MS
QTC CALCULATION (BEZET), ECG08: 459 MS
VENTRICULAR RATE, ECG03: 74 BPM

## 2020-11-19 NOTE — ED NOTES
Discharge instructions given to pt by RN in teach back method and verbalizes understanding. Opportunity for questions provided.  Pt out of unit, in no acute distress and taken home by [facility] Encounter Date: 6/25/2020       History     Chief Complaint   Patient presents with    Shortness of Breath     HPI     This is a 58-year-old man with a history of COPD on 2 L nasal cannula, which he only wears at home who presents from work with acute onset shortness of breath that began today.  He tried his albuterol inhaler at work, but did not improve his symptoms.  So he presents now for further evaluation.  He was found to be saturating 89% on room air in triage.  He currently feels short of breath, feels like this is typical of his COPD exacerbations.  He does not have a portable compressor, so he does not wear oxygen at work.  He denies fevers, chills, chest pain, syncope or presyncope, abdominal pain or any other complaints.  Review of patient's allergies indicates:   Allergen Reactions    Benazepril Swelling    Duoneb [ipratropium-albuterol]      Report Tremors     Past Medical History:   Diagnosis Date    Asthma     COPD (chronic obstructive pulmonary disease)     home O2 at night only    Coronary artery disease     Hypertension     Pneumonia     Seizures      Past Surgical History:   Procedure Laterality Date    LEFT HEART CATHETERIZATION  4/23/2020    Procedure: Left heart cath;  Surgeon: Nic Pollack MD;  Location: [facility] CATH LAB;  Service: Cardiology;;     Family History   Problem Relation Age of Onset    Cancer Father     Hypertension Sister     Stroke Sister     Stroke Brother     Diabetes Neg Hx     Heart disease Neg Hx      Social History     Tobacco Use    Smoking status: Current Every Day Smoker     Packs/day: 0.25     Types: Cigarettes    Smokeless tobacco: Never Used    Tobacco comment: 1-2 cigs per day   Substance Use Topics    Alcohol use: Yes     Alcohol/week: 2.0 standard drinks     Types: 2 Cans of beer per week     Comment: every night    Drug use: No     Review of Systems   Constitutional: Negative for chills, diaphoresis, fatigue and fever.   HENT: Negative for  congestion, rhinorrhea and sore throat.    Eyes: Negative for visual disturbance.   Respiratory: Positive for shortness of breath and wheezing. Negative for cough and chest tightness.    Cardiovascular: Negative for chest pain.   Gastrointestinal: Negative for abdominal pain, blood in stool, constipation, diarrhea and vomiting.   Genitourinary: Negative for dysuria, hematuria and urgency.   Musculoskeletal: Negative for back pain.   Skin: Negative for rash.   Neurological: Negative for seizures and syncope.   Hematological: Does not bruise/bleed easily.   Psychiatric/Behavioral: Negative for agitation and hallucinations.       Physical Exam     Initial Vitals   BP Pulse Resp Temp SpO2   06/25/20 1255 06/25/20 1216 06/25/20 1216 06/25/20 1216 06/25/20 1216   107/65 (!) 125 (!) 25 97.9 °F (36.6 °C) (!) 89 %      MAP       --                Physical Exam  Gen: AxOx3, NAD, well nourished  Eye: sclera anicteric, EOMI, no conjunctivitis, no periorbital edema  ENT: NCAT, OP clear, neck supple with FROM, no stridor  CVS: RRR, no m/r/g, distal pulses intact/symmetric  Pulm:  Diminished air movement all lung fields, no obvious wheeze, increased work of breathing, able to speak and moderate sentences  Abd: soft, nontender, nondistended, no organomegaly, no CVAT  Ext: no edema, lesions, rashes, or deformity  Neuro: GCS15, moving all extremities, gait intact  Psych: normal affect, cooperative  ED Course   Procedures  Labs Reviewed   CBC W/ AUTO DIFFERENTIAL - Abnormal; Notable for the following components:       Result Value    Hemoglobin 12.8 (*)     Mean Corpuscular Hemoglobin Conc 31.8 (*)     RDW 15.8 (*)     Platelets 355 (*)     All other components within normal limits   COMPREHENSIVE METABOLIC PANEL - Abnormal; Notable for the following components:    Sodium 133 (*)     CO2 22 (*)     Albumin 3.4 (*)     AST 59 (*)     All other components within normal limits   B-TYPE NATRIURETIC PEPTIDE - Abnormal; Notable for the  following components:     (*)     All other components within normal limits   TROPONIN I - Abnormal; Notable for the following components:    Troponin I 0.032 (*)     All other components within normal limits   TROPONIN I - Abnormal; Notable for the following components:    Troponin I 0.028 (*)     All other components within normal limits   ISTAT PROCEDURE - Abnormal; Notable for the following components:    POC PH 7.348 (*)     POC PO2 28 (*)     POC HCO3 23.9 (*)     POC SATURATED O2 50 (*)     All other components within normal limits   SARS-COV-2 RNA AMPLIFICATION, QUAL        ECG Results          EKG 12-lead (In process)  Result time 06/25/20 17:10:57    In process by Interface, Lab [facility] (06/25/20 17:10:57)                 Narrative:    Test Reason : R06.02,    Vent. Rate : 094 BPM     Atrial Rate : 094 BPM     P-R Int : 180 ms          QRS Dur : 076 ms      QT Int : 394 ms       P-R-T Axes : 096 081 086 degrees     QTc Int : 492 ms    Age and gender specific analysis  Normal sinus rhythm  Right atrial enlargement  Anteroseptal infarct ,age undetermined  Abnormal ECG  When compared with ECG of 04-JUN-2020 12:15,  Anteroseptal infarct is now Present  T wave inversion now evident in Anterior leads    Referred By: [email or web address]   SELF           Confirmed By:                             Imaging Results          X-Ray Chest AP Portable (Final result)  Result time 06/25/20 13:29:39    Final result by Georgi Austin MD (06/25/20 13:29:39)                 Impression:      As above      Electronically signed by: Georgi Austin MD  Date:    06/25/2020  Time:    13:29             Narrative:    EXAMINATION:  XR CHEST AP PORTABLE    CLINICAL HISTORY:  Shortness of breath    TECHNIQUE:  Single frontal view of the chest was performed.    COMPARISON:  Chest 06/04/2020    FINDINGS:  Suggestion of hyperinflation of the lungs.  No lobar consolidations or pleural effusions or pneumothorax.  Heart size is within normal  limits.  Mediastinum is unremarkable.  The visualized ribs demonstrate no significant abnormalities.                                 Medical Decision Making:   History:   Old Medical Records: I decided to obtain old medical records.  Initial Assessment:   This is a 58-year-old man with a history of COPD who presents with shortness of breath.  He is tight on exam, but does no obvious wheeze.  His saturation of 89% is consistent with his baseline, I do not think this represents acute hypoxemic respiratory failure.  I do suspect he is having a COPD exacerbation versus infection/pneumonia/viral process.  I do not think that this represents acute pulmonary embolism or CHF exacerbation.  We will administer IV steroids, DuoNeb treatment, COVID swab patient given that he is working in a public facing environment.  Clinical Tests:   Lab Tests: Ordered and Reviewed  Radiological Study: Ordered and Reviewed  ED Management:  Patient's COVID swab is negative.  Chest x-ray by my independent interpretation is negative for acute process.  He felt better after DuoNebs and steroids.  I think this presentation is most consistent with acute COPD exacerbation.  He has been weaned down to his home 2 L nasal cannula and is saturating well.  I think he can be discharged with refills for his medications as well as a short course of prednisone and azithromycin.  He has been seen by , who also provided him information on how to apply for disability, which is his desire.  His insurance company does not cover oxygen compressors, so he does not have an option to bring his oxygen to work easily.                                 Clinical Impression:       ICD-10-CM ICD-9-CM   1. COPD exacerbation  J44.1 491.21   2. Shortness of breath  R06.02 786.05             ED Disposition Condition    Discharge Stable        ED Prescriptions     Medication Sig Dispense Start Date End Date Auth. Provider    predniSONE (DELTASONE) 20 MG tablet Take 2  tablets (40 mg total) by mouth once daily. for 5 days 10 tablet 6/25/2020 6/30/2020 Viktoriya Hui MD    azithromycin (Z-DIEGO) 250 MG tablet Take 1 tablet (250 mg total) by mouth once daily. Take first 2 tablets together, then 1 every day until finished. 6 tablet 6/25/2020  Viktoriya Hui MD    albuterol (PROVENTIL HFA) 90 mcg/actuation inhaler Inhale 2 puffs into the lungs every 6 (six) hours as needed for Wheezing. Rescue 18 g 6/25/2020 7/25/2020 Viktoriya Hui MD        Follow-up Information     Follow up With Specialties Details Why Contact Info    [facility] Emergency Medicine  If symptoms worsen [address] [address] [phone]                                     Viktoriya Hui MD  06/25/20 0333

## 2020-11-19 NOTE — PROGRESS NOTES
Social Work Note  11/19/2020      Received referral from Dr. Yoana Araujo. PCP office received call from patient's daughter, Franko Mirza, asking for assistance with in-home caregiver resources for patient. Chart reviewed. Call - patient and   Contacted patient and her . Two identifiers used. Introduced self and explained purpose of call. Dr. Kj Caballero advised that he and Mrs. Kj Caballero are doing better after being seen in the Emergency Department yesterday. He communicated that they are both tired and achy. MEENA advised that daughter, Boris Hancock, has requested resource information to assist with care if needed in the future. Advised that SW has not spoken with daughter due to HIPAA regulations. Spoke with Dr. Kj Caballero re: current status and he advised that he did not feel that they needed an aide at this time. MEENA also spoke with Mrs. Kj Caballero. She stated that she is feeling a little better, but very \"tired\". Inquired about support from friends and family. MrsChacha Caballero advised that they have do have local support as daughters live in [place], and [place]. Explained request from daughter for resource information for in-home assistance if needed. SW requested permission to contact daughter. Verbal consent given by Mrs. Kj Caballero for SW to contact Franko Mirza to provide resource information. Call - daughter, Franko Mirza  With verbal consent from patient, SW contacted daughter, Franko Mirza. Introduced self and advised that SW was calling to provide resource information with permission of mother since daughter is not on HIPAA. Per Ms. Guzman, her parents are very independent and the daughters have become involved because of the recent COVID diagnosis, the unpredictable nature of the illness, and concern for their health. Ms. Barbara Corado advised that she would like some resources to provide assistance on an emergency basis.   She asked about assistance to check vitals daily and possibly assist with light housework, laundry, etc.  SW discussed skilled nursing through home health (must be ordered by physician) and non-medical services (laundry, assistance with ADLs, meals, light housekeeping). Provided names/contact information for following private pay non-medical home care agencies:    [address]   [phone]  [facility] [phone]    SW mentioned [employer] as well and Ms. Guzman advised that she has contact information for that agency. Discussed general hourly rates and advised that these rates would be higher due to COVID status. Ms. Wallace Perdomo voiced understanding. Provided name and contact information for SW.   SW encouraged Ms. Guzman to discuss updating HIPAA form with Ms. Anupama Hamptonred in case she and her  both became ill at the same time and medical providers needed to speak with family. No further SW needs at this time.      Viviana Michaels, MSW, ACSW, [facility]    [facility] Management Team  [phone]

## 2020-11-19 NOTE — TELEPHONE ENCOUNTER
----- Message from [address] [place] sent at 11/19/2020 11:23 AM EST -----  Regarding: Dr. Larry Gaitan  General Message/Vendor Calls    Caller's first and last name: Chelly Ball (Pt's daughter)      Reason for call: Provider Call      Callback required yes/no and why: Yes      Best contact number(s): [phone]      Details to clarify the request: Pt's daughter stated pt had dementia, is diabetic, and tested positive for Covid-19. Requesting to speak to provider in reference to assistance with pt care.        Brittany Hensley

## 2020-11-19 NOTE — TELEPHONE ENCOUNTER
I spoke with pts daughter, who lives out of [place]. She states pt was tested positive for covid on 11/10/2020. Pt is improving, chest xray was nml and her fever is gone. Daughter states her father now has covid and he isn't doing too well they may have to take him the hospital and he might get admitted. Daughter wants to know IF her dad gets admitted to the hospital is there a company, a nurse or any suggestions on how her mother can get some help or someone to check in on her? Her Dementia is getting worse and daughter is worried about her being home alone, IF her  is admitted. I advise I will send a message to PCP, pcp is out of the office this week but she has been checking her messages. I will also send this message to Timothy Verduzco,  to see if she has any suggestions. Daughter was thankful.

## 2020-11-19 NOTE — DISCHARGE INSTRUCTIONS
Patient Education        10 Things to Do When You Have COVID-19    Stay home. Don't go to school, work, or public areas. And don't use public transportation, ride-shares, or taxis unless you have no choice. Leave your home only if you need to get medical care. But call the doctor's office first so they know you're coming. And wear a cloth face cover. Ask before leaving isolation. Talk with your doctor or other health professional about when it will be safe for you to leave isolation. Wear a cloth face cover when you are around other people. It can help stop the spread of the virus when you cough or sneeze. Limit contact with people in your home. If possible, stay in a separate bedroom and use a separate bathroom. Avoid contact with pets and other animals. If possible, have a friend or family member care for them while you're sick. Cover your mouth and nose with a tissue when you cough or sneeze. Then throw the tissue in the trash right away. Wash your hands often, especially after you cough or sneeze. Use soap and water, and scrub for at least 20 seconds. If soap and water aren't available, use an alcohol-based hand . Don't share personal household items. These include bedding, towels, cups and glasses, and eating utensils. Clean and disinfect your home every day. Use household  or disinfectant wipes or sprays. Take special care to clean things that you grab with your hands. These include doorknobs, remote controls, phones, and handles on your refrigerator and microwave. And don't forget countertops, tabletops, bathrooms, and computer keyboards. Take acetaminophen (Tylenol) to relieve fever and body aches. Read and follow all instructions on the label. Current as of: July 10, 2020               Content Version: 12.6  © 2006-2020 [employer].    Care instructions adapted under license by [employer] (which disclaims liability or warranty for this information). If you have questions about a medical condition or this instruction, always ask your healthcare professional. [address] any warranty or liability for your use of this information. Patient Education        Learning About How Hospitals and Clinics Keep You Safe From COVID-19  Overview     Many hospitals and clinics now are treating people who are infected with COVID-19. So if you're in the hospital or clinic for any other reason, this may be an unsettling time. It's common to be concerned about becoming infected with the virus. But hospitals and clinics have policies to prevent the spread of infections. For example, doctors and nurses are trained to wash their hands before they treat you. Health care centers have stepped up these policies now. They are taking further steps to protect their patients. As long as [address] remains a public health problem, things are going to be different when you go to a health care facility. They may have new rules for your safety. These could include having you wear a cloth face cover, meeting you outside the clinic, and having you sit away from others in the waiting room. What are hospitals and clinics doing to keep you safe? Your care team is very aware of the threat of COVID-19. They are doing everything they can to keep you safe. Hospitals and clinics may have different policies. But in general, you may expect many of these measures:  · You will be screened for COVID-19. When you come to the hospital, you may have your temperature taken. You'll be asked about any symptoms, such as a fever, a cough, or shortness of breath. You may be asked if you've had contact with anyone who's been diagnosed with the virus. And you may be asked if you've traveled to any place that has had an outbreak. · The staff may try to do as much as possible outside the facility.  For example, you may be asked to fill out paperwork online or in your car before you come inside. The person giving you a ride home may be asked to wait outside. These new rules to help protect you may make routine tasks take longer than usual.  · People who have COVID-19 are treated in a separate area. Many hospitals and clinics have staff members who treat only these patients. This helps limit the spread of the infection. · Visitors may be limited. In some cases, no visitors are allowed. In others, only one healthy visitor is allowed. Children may be limited to having only one adult with them. You can connect with family and friends using your phone or computer. If you need something brought from home, such as glasses or a phone , find out where the item can be dropped off. · The hospital or clinic follows guidelines to prevent infection. These include:  ? Washing hands often. ? Disinfecting high-touch surfaces. ? Wearing face masks or other protective equipment. ? Making extra space for social distancing. What can you do to stay safe? We all have a role to play in keeping ourselves safe and preventing the spread of COVID-19. Here are some things you can do while you're receiving care. If you're in a hospital, stay in your room. This will limit your exposure to the virus. It may be boring, but it's the safest place for you. Wash your hands often. Use soap and water, and scrub for 20 seconds. Then rinse and dry them well. Always wash them after you use the bathroom, before you eat, and after you cough, sneeze, or blow your nose. If you can't wash your hands, use hand . Speak up if you have safety concerns. Don't be shy to correct health care workers if they aren't washing their hands properly, wearing face masks, or taking other precautions. These actions are vital to prevent the spread of infection. Try to be understanding.    This is a stressful time for everyone, including your care team. They are doing their best to keep you safe and provide you with good care. Current as of: July 10, 2020               Content Version: 12.6  © 6576-0371 [employer]. Care instructions adapted under license by [employer] (which disclaims liability or warranty for this information). If you have questions about a medical condition or this instruction, always ask your healthcare professional. [place] [address] any warranty or liability for your use of this information.

## 2020-11-20 ENCOUNTER — PATIENT OUTREACH (OUTPATIENT)
Dept: CASE MANAGEMENT | Age: 75
End: 2020-11-20

## 2020-11-20 ENCOUNTER — TELEPHONE (OUTPATIENT)
Dept: INTERNAL MEDICINE CLINIC | Age: 75
End: 2020-11-20

## 2020-11-20 DIAGNOSIS — U07.1 COVID-19: Primary | ICD-10-CM

## 2020-11-20 RX ORDER — LOPERAMIDE HCL 2 MG
TABLET ORAL
Qty: 30 TAB | Refills: 1 | Status: SHIPPED | OUTPATIENT
Start: 2020-11-20 | End: 2022-08-17

## 2020-11-20 NOTE — PROGRESS NOTES
Thanks for reaching out. They may want to get a nurse to check pt's vitals if any concerns. I do not know if medicare will cover for daily visits and check ins if no acute issue. If concerns maybe they can do a private pay to ensure pt is being checked.  Thanks, cherelle

## 2020-11-20 NOTE — TELEPHONE ENCOUNTER
Spoke to the patient and her  in regards to the [employer] message they sent. Patient reports that she continues to have diarrhea that is watery and low-volume. This is the same diarrhea she had when she had her appointment with me and it has not gotten worse but has not improved. Denies any abdominal pain. Diarrhea started prior to abx. Loperamide sent to pharmacy. Advised to monitor and if worsens or if she develops any abdominal pain, recommend going to urgent care for C. difficile evaluation.

## 2020-11-20 NOTE — PROGRESS NOTES
Patient's spouse Mirtha Palomares HIPAA verified contacted regarding COVID-19 diagnosis. Discussed COVID-19 related testing which was done prior to ED visit. Patient's spouse reports test results were positive. Outreach made within 2 business days of discharge: Yes    Care Transition Nurse/ Ambulatory Care Manager/ LPN Care Coordinator contacted the family by telephone to perform post discharge assessment. Verified name and  with family as identifiers. Provided introduction to self, and explanation of the CTN/ACM/LPN role, and reason for call due to risk factors for infection and/or exposure to COVID-19. Symptoms reviewed with family who verbalized the following symptoms: no new symptoms and no worsening symptoms. Due to no new or worsening symptoms encounter was not routed to provider for escalation. Discussed follow-up appointments. If no appointment was previously scheduled, appointment scheduling offered: [facility] follow up appointment(s):   Future Appointments   Date Time Provider Olesya Dunbar   2021 10:30 AM SPT CT 1 SPTRCT SPT   2021 10:40 AM Aravind Corbin NP [address] [facility] AMB   2021  1:00 PM MD JOHN Leblanc BS AMB     Non-BS follow up appointment(s): n/a      Advance Care Planning:   Does patient have an Advance Directive: Not on file per EMR patient spouse Ian Iniguez is healthcare decison maker    Patient has following risk factors of: diabetes and CAD . CTN/ACM/LPN reviewed discharge instructions, medical action plan and red flags such as increased shortness of breath, increasing fever and signs of decompensation with family who verbalized understanding. Discussed exposure protocols and quarantine with CDC Guidelines What to do if you are sick with coronavirus disease .  Family was given an opportunity for questions and concerns.  The family agrees to contact the [employer] exposure line [phone], local health department [place]EN [place] ([phone]) and PCP office for questions related to their healthcare. CTN/ACM provided contact information for future needs. Reviewed and educated family on any new and changed medications related to discharge diagnosis. Patient/family/caregiver given information for [employer] and agrees to enroll yes  Patient's preferred e-mail: [email or web address] (spouse email address)  Patient's preferred phone number: [phone]   Based on Loop alert triggers, patient will be contacted by nurse care manager for worsening symptoms. Pt will be further monitored by COVID Loop Team based on severity of symptoms and risk factors.

## 2020-11-23 DIAGNOSIS — U07.1 COVID-19: Primary | ICD-10-CM

## 2020-11-23 RX ORDER — ONDANSETRON 4 MG/1
4 TABLET, ORALLY DISINTEGRATING ORAL
Qty: 20 TAB | Refills: 1 | Status: SHIPPED | OUTPATIENT
Start: 2020-11-23 | End: 2021-01-12

## 2021-01-05 ENCOUNTER — OFFICE VISIT (OUTPATIENT)
Dept: INTERNAL MEDICINE CLINIC | Age: 76
End: 2021-01-05
Payer: MEDICARE

## 2021-01-05 VITALS
DIASTOLIC BLOOD PRESSURE: 70 MMHG | WEIGHT: 193.4 LBS | SYSTOLIC BLOOD PRESSURE: 108 MMHG | TEMPERATURE: 98.2 F | HEART RATE: 68 BPM | RESPIRATION RATE: 16 BRPM | BODY MASS INDEX: 35.59 KG/M2 | HEIGHT: 62 IN | OXYGEN SATURATION: 96 %

## 2021-01-05 DIAGNOSIS — E87.1 HYPONATREMIA: ICD-10-CM

## 2021-01-05 DIAGNOSIS — E11.9 CONTROLLED TYPE 2 DIABETES MELLITUS WITHOUT COMPLICATION, WITHOUT LONG-TERM CURRENT USE OF INSULIN (HCC): ICD-10-CM

## 2021-01-05 DIAGNOSIS — I10 ESSENTIAL HYPERTENSION: ICD-10-CM

## 2021-01-05 DIAGNOSIS — E03.9 ACQUIRED HYPOTHYROIDISM: Primary | ICD-10-CM

## 2021-01-05 PROCEDURE — G8417 CALC BMI ABV UP PARAM F/U: HCPCS | Performed by: INTERNAL MEDICINE

## 2021-01-05 PROCEDURE — 3046F HEMOGLOBIN A1C LEVEL >9.0%: CPT | Performed by: INTERNAL MEDICINE

## 2021-01-05 PROCEDURE — G8432 DEP SCR NOT DOC, RNG: HCPCS | Performed by: INTERNAL MEDICINE

## 2021-01-05 PROCEDURE — 1101F PT FALLS ASSESS-DOCD LE1/YR: CPT | Performed by: INTERNAL MEDICINE

## 2021-01-05 PROCEDURE — G8754 DIAS BP LESS 90: HCPCS | Performed by: INTERNAL MEDICINE

## 2021-01-05 PROCEDURE — G8400 PT W/DXA NO RESULTS DOC: HCPCS | Performed by: INTERNAL MEDICINE

## 2021-01-05 PROCEDURE — G8752 SYS BP LESS 140: HCPCS | Performed by: INTERNAL MEDICINE

## 2021-01-05 PROCEDURE — 1090F PRES/ABSN URINE INCON ASSESS: CPT | Performed by: INTERNAL MEDICINE

## 2021-01-05 PROCEDURE — G0463 HOSPITAL OUTPT CLINIC VISIT: HCPCS | Performed by: INTERNAL MEDICINE

## 2021-01-05 PROCEDURE — 99214 OFFICE O/P EST MOD 30 MIN: CPT | Performed by: INTERNAL MEDICINE

## 2021-01-05 PROCEDURE — G8427 DOCREV CUR MEDS BY ELIG CLIN: HCPCS | Performed by: INTERNAL MEDICINE

## 2021-01-05 PROCEDURE — G8536 NO DOC ELDER MAL SCRN: HCPCS | Performed by: INTERNAL MEDICINE

## 2021-01-05 PROCEDURE — 2022F DILAT RTA XM EVC RTNOPTHY: CPT | Performed by: INTERNAL MEDICINE

## 2021-01-05 PROCEDURE — 3017F COLORECTAL CA SCREEN DOC REV: CPT | Performed by: INTERNAL MEDICINE

## 2021-01-05 NOTE — PROGRESS NOTES
Diagnoses and all orders for this visit:    1. Acquired hypothyroidism  Patient is being followed by Dr. Darling Batista  We will check labs and forward to endocrinology  -     TSH 3RD GENERATION; Future  -     METABOLIC PANEL, COMPREHENSIVE; Future    2. Essential hypertension  Stable continue meds  -     MAGNESIUM; Future    3. Controlled type 2 diabetes mellitus without complication, without long-term current use of insulin (HCC)  Stable continue meds  -     HEMOGLOBIN A1C WITH EAG; Future    4. Hyponatremia  Patient was diagnosed with Covid and GI disturbance  She feels she is close to her baseline  She is on SSRI and will ensure sodium has improved           Chief Complaint   Patient presents with    Follow-up     Has many concerns      SUBJECTIVE: Vanessa Nelson is a 76 y.o. female here for follow up of hypothyroidism. Lab Results   Component Value Date/Time    TSH 2.890 08/09/2019 11:30 AM     Thyroid ROS: denies fatigue, weight changes, heat/cold intolerance, bowel/skin changes or CVS symptoms. When she had her spell of diarrhea and GI illness she did lose weight. Subjective:   Vanessa Nelson is a 76 y.o. female with hypertension. Hypertension ROS: taking medications as instructed, no medication side effects noted, no TIA's, no chest pain on exertion, no dyspnea on exertion, no swelling of ankles. New concerns: No new changes. She did not have any falls or dehydration with her Covid GI symptoms. Hyponatremia  Labs reviewed  Patient reports she is back to eating a regular diet. Diabetes  Patient reports that her blood sugars are higher since her illness. She is running in the 200 range. She is due to see Dr. Darling Batista but she is being evaluated virtually. She will not go into the office.         Past Medical History:   Diagnosis Date    Arthritis     CAD (coronary artery disease) 2006    STENT PLACED>PLAQUE BROKE OFF--HAD \"MINOR MI\"    Deviated septum 12/20/13    HAS TROUBLE BREATHING THROUGH LEFT SIDE; CT SHOWED POLYP    Diabetes ([address]     Heart disease     Hypertension     Incontinence     Nasal polyp     Unspecified sleep apnea 2013    cpap     Past Surgical History:   Procedure Laterality Date    HX  SECTION      X3    HX COLONOSCOPY      HX HEENT  ? SEPTOPLASTY    [place] CARDIAC SURG PROCEDURE UNLIST      CARDIAC CATH;ANGIOPLASTY WITH STENT     Social History     Socioeconomic History    Marital status:      Spouse name: Not on file    Number of children: Not on file    Years of education: Not on file    Highest education level: Not on file   Tobacco Use    Smoking status: Former Smoker     Packs/day: 1.00     Years: 5.00     Pack years: 5.00     Quit date: 1970     Years since quittin.0    Smokeless tobacco: Never Used   Substance and Sexual Activity    Alcohol use: Yes     Comment: \"VERY LITTLE ALCOHOL\"    Drug use: No   Social History Narrative            3 children women 36, 40, 40 healthy other than skin issues     Family History   Problem Relation Age of Onset    Heart Disease Mother     Heart Surgery Mother         VALVE REPLACED AT AGE 72    Heart Attack Father 46        MASSIVE     Anesth Problems Neg Hx      Current Outpatient Medications   Medication Sig Dispense Refill    levothyroxine (SYNTHROID) 50 mcg tablet TAKE 1 TABLET BY MOUTH EVERY DAY 30 Tab 0    ondansetron (ZOFRAN ODT) 4 mg disintegrating tablet Take 1 Tab by mouth every eight (8) hours as needed for Nausea or Vomiting. 20 Tab 1    loperamide (IMMODIUM) 2 mg tablet Take 2 tabs by mouth after the first loose stool of the day then take 1 tab after each bowel movement. Do not exceed 8 tablets total per day.  30 Tab 1    losartan-hydroCHLOROthiazide (HYZAAR) 50-12.5 mg per tablet TAKE 1 TABLET BY MOUTH TWICE A  Tab 0    sertraline (ZOLOFT) 100 mg tablet TAKE 1 TABLET BY MOUTH EVERY DAY IN THE EVENING 90 Tab 0    Trulicity 1.5 YA/6.2 mL sub-q pen 1.5 mg by SubCUTAneous route.  metoprolol tartrate (LOPRESSOR) 25 mg tablet TAKE 1 TABLET BY MOUTH TWICE A  Tab 3    rosuvastatin (CRESTOR) 20 mg tablet TAKE 1 TABLET BY MOUTH EVERY DAY AT BEDTIME 90 Tab 1    cyanocobalamin 1,000 mcg tablet Take 1,000 mcg by mouth daily.  mirabegron ER (MYRBETRIQ) 50 mg ER tablet Take 50 mg by mouth daily.  ACCU-CHEK COMPACT PLUS TEST strp USE TO TEST BLOOD SUGAR 4 TIMES DAILY. DX E11.9 357 Strip 3    magnesium oxide (MAG-OX) 400 mg tablet TAKE 1 TAB BY MOUTH TWO (2) TIMES A DAY. 240 Tab 1    Blood Sugar Diagnostic, Drum (ACCU-CHEK COMPACT PLUS TEST) strp USE TO TEST BLOOD SUGAR 4 TIMES DAILY. DX E11.9 357 Strip 0    metFORMIN ER (GLUCOPHAGE XR) 500 mg tablet TAKE 4 TABLETS BY MOUTH EVERY DAY  3    insulin glargine (LANTUS) 100 unit/mL injection 20 Units by SubCUTAneous route two (2) times a day. (Patient taking differently: 44 Units by SubCUTAneous route daily. Sliding scale) 1 Vial 3    HUMALOG KWIKPEN 100 unit/mL kwikpen once. 30 units nightly  3    Cholecalciferol, Vitamin D3, (VITAMIN D3) 1,000 unit cap Take 1 Cap by mouth nightly. 30 Cap 3    Aspirin, Buffered 81 mg tab Take  by mouth daily.  omega-3 acid ethyl esters (LOVAZA) 1 gram capsule Take 1 g by mouth daily.        Allergies   Allergen Reactions    Adhesive Rash and Itching    Advil [Ibuprofen] Swelling     FEET SWELL    Ceftin [Cefuroxime Axetil] Itching    Lotensin [Benazepril] Cough    Penicillins Swelling    Sulfa (Sulfonamide Antibiotics) Rash, Itching and Swelling       Review of Systems - General ROS: negative for - chills or fever  Cardiovascular ROS: no chest pain or dyspnea on exertion  Respiratory ROS: no cough, shortness of breath, or wheezing    Visit Vitals  /70 (BP 1 Location: Left arm, BP Patient Position: Sitting)   Pulse 68   Temp 98.2 °F (36.8 °C) (Oral)   Resp 16   Ht 5' 2\" (1.575 m)   Wt 193 lb 6.4 oz (87.7 kg)   SpO2 96%   BMI 35.37 kg/m² Constitutional: [x] Appears well-developed and well-nourished [x] No apparent distress      [] Abnormal -     Mental status: [x] Alert and awake  [x] Oriented to person/place/time [x] Able to follow commands    [] Abnormal -     Eyes:   EOM    [x]  Normal    [] Abnormal -   Sclera  [x]  Normal    [] Abnormal -          Discharge [x]  None visible   [] Abnormal -     HENT: [x] Normocephalic, atraumatic  [] Abnormal -   [x] Mouth/Throat: Mucous membranes are moist    External Ears [x] Normal  [] Abnormal -    Neck: [x] No visualized mass [] Abnormal -     Pulmonary/Chest: [x] Respiratory effort normal   [x] No visualized signs of difficulty breathing or respiratory distress        [] Abnormal -      Musculoskeletal:   [x] Normal gait with no signs of ataxia         [x] Normal range of motion of neck        [] Abnormal -     Neurological:        [x] No Facial Asymmetry (Cranial nerve 7 motor function) (limited exam due to video visit)          [x] No gaze palsy        [] Abnormal -          Skin:        [x] No significant exanthematous lesions or discoloration noted on facial skin         [] Abnormal -            Psychiatric:       [x] Normal Affect [] Abnormal -        [x] No Hallucinations            ATTENTION:   This medical record was transcribed using an electronic medical records/speech recognition system. Although proofread, it may and can contain electronic, spelling and other errors. Corrections may be executed at a later time. Please feel free to contact us for any clarifications as needed.

## 2021-01-06 ENCOUNTER — HOSPITAL ENCOUNTER (OUTPATIENT)
Dept: CT IMAGING | Age: 76
Discharge: HOME | End: 2021-01-06
Attending: INTERNAL MEDICINE
Payer: MEDICARE

## 2021-01-06 DIAGNOSIS — R91.8 PULMONARY NODULES: ICD-10-CM

## 2021-01-06 LAB
ALBUMIN SERPL-MCNC: 4 G/DL (ref 3.5–5)
ALBUMIN/GLOB SERPL: 1.3 {RATIO} (ref 1.1–2.2)
ALP SERPL-CCNC: 90 U/L (ref 45–117)
ALT SERPL-CCNC: 36 U/L (ref 12–78)
ANION GAP SERPL CALC-SCNC: 8 MMOL/L (ref 5–15)
AST SERPL-CCNC: 32 U/L (ref 15–37)
BILIRUB SERPL-MCNC: 0.6 MG/DL (ref 0.2–1)
BUN SERPL-MCNC: 26 MG/DL (ref 6–20)
BUN/CREAT SERPL: 25 (ref 12–20)
CALCIUM SERPL-MCNC: 9.7 MG/DL (ref 8.5–10.1)
CHLORIDE SERPL-SCNC: 104 MMOL/L (ref 97–108)
CO2 SERPL-SCNC: 26 MMOL/L (ref 21–32)
CREAT SERPL-MCNC: 1.06 MG/DL (ref 0.55–1.02)
EST. AVERAGE GLUCOSE BLD GHB EST-MCNC: 189 MG/DL
GLOBULIN SER CALC-MCNC: 3.1 G/DL (ref 2–4)
GLUCOSE SERPL-MCNC: 219 MG/DL (ref 65–100)
HBA1C MFR BLD: 8.2 % (ref 4–5.6)
MAGNESIUM SERPL-MCNC: 1.7 MG/DL (ref 1.6–2.4)
POTASSIUM SERPL-SCNC: 4 MMOL/L (ref 3.5–5.1)
PROT SERPL-MCNC: 7.1 G/DL (ref 6.4–8.2)
SODIUM SERPL-SCNC: 138 MMOL/L (ref 136–145)
TSH SERPL DL<=0.05 MIU/L-ACNC: 1.94 UIU/ML (ref 0.36–3.74)

## 2021-01-06 PROCEDURE — 71250 CT THORAX DX C-: CPT

## 2021-01-12 DIAGNOSIS — U07.1 COVID-19: ICD-10-CM

## 2021-01-12 RX ORDER — ONDANSETRON 4 MG/1
4 TABLET, ORALLY DISINTEGRATING ORAL
Qty: 20 TAB | Refills: 1 | Status: SHIPPED | OUTPATIENT
Start: 2021-01-12 | End: 2021-07-25

## 2021-01-13 ENCOUNTER — VIRTUAL VISIT (OUTPATIENT)
Dept: SLEEP MEDICINE | Age: 76
End: 2021-01-13
Payer: MEDICARE

## 2021-01-13 ENCOUNTER — DOCUMENTATION ONLY (OUTPATIENT)
Dept: SLEEP MEDICINE | Age: 76
End: 2021-01-13

## 2021-01-13 DIAGNOSIS — I10 ESSENTIAL HYPERTENSION WITH GOAL BLOOD PRESSURE LESS THAN 130/80: ICD-10-CM

## 2021-01-13 DIAGNOSIS — E11.21 TYPE 2 DIABETES WITH NEPHROPATHY (HCC): ICD-10-CM

## 2021-01-13 DIAGNOSIS — G47.33 OBSTRUCTIVE SLEEP APNEA (ADULT) (PEDIATRIC): Primary | ICD-10-CM

## 2021-01-13 PROCEDURE — 3017F COLORECTAL CA SCREEN DOC REV: CPT | Performed by: NURSE PRACTITIONER

## 2021-01-13 PROCEDURE — G8400 PT W/DXA NO RESULTS DOC: HCPCS | Performed by: NURSE PRACTITIONER

## 2021-01-13 PROCEDURE — G8536 NO DOC ELDER MAL SCRN: HCPCS | Performed by: NURSE PRACTITIONER

## 2021-01-13 PROCEDURE — G8417 CALC BMI ABV UP PARAM F/U: HCPCS | Performed by: NURSE PRACTITIONER

## 2021-01-13 PROCEDURE — 99213 OFFICE O/P EST LOW 20 MIN: CPT | Performed by: NURSE PRACTITIONER

## 2021-01-13 PROCEDURE — G8432 DEP SCR NOT DOC, RNG: HCPCS | Performed by: NURSE PRACTITIONER

## 2021-01-13 PROCEDURE — 1090F PRES/ABSN URINE INCON ASSESS: CPT | Performed by: NURSE PRACTITIONER

## 2021-01-13 PROCEDURE — 1101F PT FALLS ASSESS-DOCD LE1/YR: CPT | Performed by: NURSE PRACTITIONER

## 2021-01-13 PROCEDURE — G8756 NO BP MEASURE DOC: HCPCS | Performed by: NURSE PRACTITIONER

## 2021-01-13 PROCEDURE — G8427 DOCREV CUR MEDS BY ELIG CLIN: HCPCS | Performed by: NURSE PRACTITIONER

## 2021-01-13 PROCEDURE — 2022F DILAT RTA XM EVC RTNOPTHY: CPT | Performed by: NURSE PRACTITIONER

## 2021-01-13 PROCEDURE — 3052F HG A1C>EQUAL 8.0%<EQUAL 9.0%: CPT | Performed by: NURSE PRACTITIONER

## 2021-01-13 NOTE — PATIENT INSTRUCTIONS
217 [facility] [place]. 1668 [facility], [address] Tel.  [phone] Fax. 100 [facility] 60 [place], [address] Tel.  [phone] Fax. [phone] [address] Tel.  [phone] Fax. [phone] Learning About CPAP for Sleep Apnea What is CPAP? CPAP is a small machine that you use at home every night while you sleep. It increases air pressure in your throat to keep your airway open. When you have sleep apnea, this can help you sleep better so you feel much better. CPAP stands for \"continuous positive airway pressure. \" The CPAP machine will have one of the following: · A mask that covers your nose and mouth · Prongs that fit into your nose · A mask that covers your nose only, the most common type. This type is called NCPAP. The N stands for \"nasal.\" Why is it done? CPAP is usually the best treatment for obstructive sleep apnea. It is the first treatment choice and the most widely used. Your doctor may suggest CPAP if you have: · Moderate to severe sleep apnea. · Sleep apnea and coronary artery disease (CAD) or heart failure. How does it help? · CPAP can help you have more normal sleep, so you feel less sleepy and more alert during the daytime. · CPAP may help keep heart failure or other heart problems from getting worse. · NCPAP may help lower your blood pressure. · If you use CPAP, your bed partner may also sleep better because you are not snoring or restless. What are the side effects? Some people who use CPAP have: · A dry or stuffy nose and a sore throat. · Irritated skin on the face. · Sore eyes. · Bloating. If you have any of these problems, work with your doctor to fix them. Here are some things you can try: · Be sure the mask or nasal prongs fit well. · See if your doctor can adjust the pressure of your CPAP. · If your nose is dry, try a humidifier. · If your nose is runny or stuffy, try decongestant medicine or a steroid nasal spray. If these things do not help, you might try a different type of machine. Some machines have air pressure that adjusts on its own. Others have air pressures that are different when you breathe in than when you breathe out. This may reduce discomfort caused by too much pressure in your nose. Where can you learn more? Go to AudioName.be Enter X975 in the search box to learn more about \"Learning About CPAP for Sleep Apnea. \"  
© 5382-3014 Healthwise, Incorporated. Care instructions adapted under license by [employer] (which disclaims liability or warranty for this information). This care instruction is for use with your licensed healthcare professional. If you have questions about a medical condition or this instruction, always ask your healthcare professional. Suma Dutton any warranty or liability for your use of this information. Content Version: 3.4.54221; Last Revised: January 11, 2010 PROPER SLEEP HYGIENE What to avoid · Do not have drinks with caffeine, such as coffee or black tea, for 8 hours before bed. · Do not smoke or use other types of tobacco near bedtime. Nicotine is a stimulant and can keep you awake. · Avoid drinking alcohol late in the evening, because it can cause you to wake in the middle of the night. · Do not eat a big meal close to bedtime. If you are hungry, eat a light snack. · Do not drink a lot of water close to bedtime, because the need to urinate may wake you up during the night. · Do not read or watch TV in bed. Use the bed only for sleeping and sexual activity. What to try · Go to bed at the same time every night, and wake up at the same time every morning. Do not take naps during the day. · Keep your bedroom quiet, dark, and cool. · Get regular exercise, but not within 3 to 4 hours of your bedtime. Aayush Fleming · Sleep on a comfortable pillow and mattress. · If watching the clock makes you anxious, turn it facing away from you so you cannot see the time. · If you worry when you lie down, start a worry book. Well before bedtime, write down your worries, and then set the book and your concerns aside. · Try meditation or other relaxation techniques before you go to bed. · If you cannot fall asleep, get up and go to another room until you feel sleepy. Do something relaxing. Repeat your bedtime routine before you go to bed again. · Make your house quiet and calm about an hour before bedtime. Turn down the lights, turn off the TV, log off the computer, and turn down the volume on music. This can help you relax after a busy day. Drowsy Driving: The [facility] 54 cites drowsiness as a causing factor in more than 525,377 police reported crashes annually, resulting in 76,000 injuries and 1,500 deaths. Other surveys suggest 55% of people polled have driven while drowsy in the past year, 23% had fallen asleep but not crashed, 3% crashed, and 2% had and accident due to drowsy driving. Who is at risk? Young Drivers: One study of drowsy driving accidents states that 55% of the drivers were under 25 years. Of those, 75% were male. Shift Workers and Travelers: People who work overnight or travel across time zones frequently are at higher risk of experiencing Circadian Rhythm Disorders. They are trying to work and function when their body is programed to sleep. Sleep Deprived: Lack of sleep has a serious impact on your ability to pay attention or focus on a task. Consistently getting less than the average of 8 hours your body needs creates partial or cumulative sleep deprivation. Untreated Sleep Disorders: Sleep Apnea, Narcolepsy, R.L.S., and other sleep disorders (untreated) prevent a person from getting enough restful sleep. This leads to excessive daytime sleepiness and increases the risk for drowsy driving accidents by up to 7 times. Medications / Alcohol: Even over the counter medications can cause drowsiness. Medications that impair a drivers attention should have a warning label. Alcohol naturally makes you sleepy and on its own can cause accidents. Combined with excessive drowsiness its effects are amplified. Signs of Drowsy Driving: * You don't remember driving the last few miles * You may drift out of your jerry * You are unable to focus and your thoughts wander * You may yawn more often than normal 
 * You have difficulty keeping your eyes open / nodding off * Missing traffic signs, speeding, or tailgating Prevention-  
Good sleep hygiene, lifestyle and behavioral choices have the most impact on drowsy driving. There is no substitute for sleep and the average person requires 8 hours nightly. If you find yourself driving drowsy, stop and sleep. Consider the sleep hygiene tips provided during your visit as well. Medication Refill Policy: Refills for all medications require 1 week advance notice. Please have your pharmacy fax a refill request. We are unable to fax, or call in \"controled substance\" medications and you will need to pick these prescriptions up from our office. Zeebo Activation Thank you for requesting access to Zeebo. Please follow the instructions below to securely access and download your online medical record. Zeebo allows you to send messages to your doctor, view your test results, renew your prescriptions, schedule appointments, and more. How Do I Sign Up? 1. In your internet browser, go to [email or web address]. 2. Click on the First Time User? Click Here link in the Sign In box. You will see the New Member Sign Up page. 3. Enter your CrowdSling Access Code exactly as it appears below. You will not need to use this code after youve completed the sign-up process. If you do not sign up before the expiration date, you must request a new code. MyChart Access Code: Activation code not generated Current CrowdSling Status: Active (This is the date your MyChart access code will ) 4. Enter the last four digits of your Social Security Number (xxxx) and Date of Birth (mm/dd/yyyy) as indicated and click Submit. You will be taken to the next sign-up page. 5. Create a Eyepict ID. This will be your CrowdSling login ID and cannot be changed, so think of one that is secure and easy to remember. 6. Create a CrowdSling password. You can change your password at any time. 7. Enter your Password Reset Question and Answer. This can be used at a later time if you forget your password. 8. Enter your e-mail address. You will receive e-mail notification when new information is available in [address] 9. Click Sign Up. You can now view and download portions of your medical record. 10. Click the Download Summary menu link to download a portable copy of your medical information. Additional Information If you have questions, please call [phone]. Remember, CrowdSling is NOT to be used for urgent needs. For medical emergencies, dial 911.

## 2021-01-13 NOTE — PROGRESS NOTES
[address] [place]. [place], [address]   Tel.  [phone]   Fax. 100 [facility] 60   [place], [address] [facility]   Tel.  [phone]   Fax. [phone]  31 [facility] Kacie Campos   Tel.  [phone]   Fax. Julian Chan (: 1945) is a 76 y.o. female, established patient, seen for positive airway pressure follow-up and evaluation of the following chief complaint(s):   Sleep Apnea follow up     Alexandre Lofton was last seen by me on 2020, previously  seen by Dr. Fatoumata Abraham on 2019, prior notes reviewed in detail. Home sleep test 3/2016 showed AHI of 18.6/hr with a lowest SaO2 of 71%, duration of SaO2 < 88% 125 min. She was COVID + in November, she feels she has generally recovered although recently has some nausea, encouraged to follow up with PCP. ASSESSMENT/PLAN:    ICD-10-CM ICD-9-CM    1. Obstructive sleep apnea (adult) (pediatric)  G47.33 327.23 AMB SUPPLY ORDER   2. Essential hypertension with goal blood pressure less than 130/80  I10 401.9    3. Type 2 diabetes with nephropathy (HCC)  E11.21 250.40      583.81    4. Adult BMI 35.0-35.9 kg/sq m  Z68.35 V85.35        AHI = 18.6(3/2016). On APAP, Respironics : 5-10  cmH2O. Set up 5/3/2016. She is not compliant with PAP therapy although when used PAP shows benefit to the patient and remains necessary for control of her sleep apnea. There is continued clinical benefit from the hours of use demonstrated by AHI reduced from 18.6/hr to 4.7/hr. Follow-up and Dispositions    · Return in about 6 months (around 2021). 1. Sleep Apnea -  Continue on current pressures    * Supplies ordered - nasal mask and heated tubing    Orders Placed This Encounter    AMB SUPPLY ORDER     Diagnosis: (G47.33) TREVIN (obstructive sleep apnea)  (primary encounter diagnosis)     Replacement Supplies for Positive Airway Pressure Therapy Device:   Duration of need: 99 months.  Nasal Cushion (Replace) 2 per month.  Nasal Interface Mask 1 every 3 months.  Pos Airway pressure chin strap   Headgear 1 every 6 months.  Tubing with heating element 1 every 3 months.  Filter(s) Disposable 2 per month.  Filter(s) Non-Disposable 1 every 6 months. .   [address] for Humidifier (Replace) 1 every 6 months. Jolynn Juan, ANAMARLEN-BC; NPI: [id number]    Electronically signed. Date:- 01/13/21       * Counseling was provided regarding the importance of regular PAP use with emphasis on ensuring sufficient total sleep time, proper sleep hygiene, and safe driving. * Re-enforced proper and regular cleaning for the device. * She was asked to contact our office for any problems regarding PAP therapy. 2. Hypertension -  continue on her current regimen, she will continue to monitor her BP and follow up with her PMD for reevaluation/adjustment of medications if warranted. I have reviewed the relationship between hypertension as it relates to sleep-disordered breathing. 3. Type II diabetes -  she continues on her current regimen. I have reviewed the relationship between sleep disordered breathing as it relates to diabetes. 4. Encouraged continued weight management program through appropriate diet and exercise regimen as significant weight reduction has been shown to reduce severity of obstructive sleep apnea. She has been walking and going to the gym    SUBJECTIVE/OBJECTIVE:    She  is seen today for follow up on PAP device and reports some problems using the device. The following concerns identified:    Drowsiness no Problems exhaling no   Snoring no Forget to put on no   Mask Comfortable yes Can't fall asleep no   Dry Mouth no Mask falls off no   Air Leaking no Frequent awakenings no       She admits that her sleep has improved on PAP therapy using nasal mask and heated tubing.  She had not been using her device when she was recovering from [employer] but has restarted. Review of device download indicated:  Auto pressure: 5-10 cmH2O; Peak Avg Pressure: 7.7 cmH2O;  Avg. Device Pressure <= 90 %: 6.2 cmH2O      Average % Night in Large Leak:  41.3  % Used Days >= 4 hours: 17.  Avg hours used:  3:46. Therapy Apnea Index averaged over PAP use: 4.7 /hr which reflects improved sleep breathing condition. [place] Sleepiness Score: 8 and Modified F.O.S.Q. Score Total / 2: 18.5 which reflects improved sleep quality over therapy time. Sleep Review of Systems: notable for Negative difficulty falling asleep; Positive awakenings at night; Negative early morning headaches; Negative memory problems; Negative concentration issues; Negative chest pain; Negative shortness of breath; Negative significant joint pain at night; Negative significant muscle pain at night; Negative rashes or itching; Negative heartburn; Negative significant mood issues; occasional afternoon naps    Vitals reported by patient     Patient-Reported Vitals 1/13/2021   Patient-Reported Weight 193 lb   Patient-Reported Height -   Patient-Reported Pulse -   Patient-Reported Temperature 98.2   Patient-Reported Systolic  074   Patient-Reported Diastolic 70      Calculated BMI 35    Physical Exam completed by visual and auditory observation of patient with verbal input from patient.     General:   Alert, oriented, not in acute distress   Eyes:  Anicteric Sclerae; no obvious strabismus   Nose:  No obvious nasal septum deviation    Neck:   Midline trachea, no visible mass   Chest/Lungs:  Respiratory effort normal, no visualized signs of difficulty breathing or respiratory distress   CVS:  No JVD   Extremities:  No obvious rashes noted on face, neck, or hands   Neuro:  No facial asymmetry, no focal deficits; no obvious tremor    Psych:  Normal affect,  normal countenance       Geovanna Forward is being evaluated by a Virtual Visit (video visit) encounter to address concerns as mentioned above.  A caregiver was present when appropriate. Due to this being a TeleHealth encounter (During BYMSE-14 public health emergency), evaluation of the following organ systems was limited: Vitals/Constitutional/EENT/Resp/CV/GI//MS/Neuro/Skin/Heme-Lymph-Imm. Pursuant to the emergency declaration under the [address] [place] [address], [address] [place] [address] and the [employer] Act, this Virtual Visit was conducted with patient's (and/or legal guardian's) consent, to reduce the patient's risk of exposure to COVID-19 and provide necessary medical care. Patient identification was verified at the start of the visit: YES using name and date of birth. Patient's phone number [phone] (home)  was confirmed for accuracy. She gives permission for messages regarding results and appointments to be left at that number. Services were provided through a video synchronous discussion virtually to substitute for in-person clinic visit. I was in the office while conducting this encounter, patient located at their home or alternate location of their choice. An electronic signature was used to authenticate this note.   -- Eyal Kamara NP, [facility]  01/13/21

## 2021-02-02 RX ORDER — ROSUVASTATIN CALCIUM 20 MG/1
TABLET, COATED ORAL
Qty: 90 TAB | Refills: 1 | Status: SHIPPED | OUTPATIENT
Start: 2021-02-02 | End: 2021-07-23

## 2021-02-02 NOTE — TELEPHONE ENCOUNTER
Requested Prescriptions     Signed Prescriptions Disp Refills    rosuvastatin (CRESTOR) 20 mg tablet 90 Tab 1     Sig: TAKE 1 TABLET BY MOUTH EVERYDAY AT BEDTIME     Authorizing Provider: Abena Burrell     Ordering User: Shelby Barbour       Per Dr. Yulia Alexandra   Date Time Provider Olesya Dunbar   7/14/2021  9:40 AM Ryne Gonzales NP [facility] BS AMB   8/17/2021  1:00 PM MD JOHN Pennington BS AMB

## 2021-03-01 DIAGNOSIS — I10 ESSENTIAL HYPERTENSION: ICD-10-CM

## 2021-03-01 RX ORDER — LOSARTAN POTASSIUM AND HYDROCHLOROTHIAZIDE 12.5; 5 MG/1; MG/1
TABLET ORAL
Qty: 180 TAB | Refills: 0 | Status: SHIPPED | OUTPATIENT
Start: 2021-03-01 | End: 2021-05-24

## 2021-03-23 RX ORDER — SERTRALINE HYDROCHLORIDE 100 MG/1
TABLET, FILM COATED ORAL
Qty: 90 TAB | Refills: 0 | Status: SHIPPED | OUTPATIENT
Start: 2021-03-23 | End: 2021-04-05 | Stop reason: SDUPTHER

## 2021-04-04 ENCOUNTER — PATIENT MESSAGE (OUTPATIENT)
Dept: INTERNAL MEDICINE CLINIC | Age: 76
End: 2021-04-04

## 2021-04-05 RX ORDER — SERTRALINE HYDROCHLORIDE 100 MG/1
TABLET, FILM COATED ORAL
Qty: 90 TAB | Refills: 0 | Status: SHIPPED | OUTPATIENT
Start: 2021-04-05 | End: 2021-09-21

## 2021-04-05 NOTE — TELEPHONE ENCOUNTER
From: Kyle Maxwell  To: Carolyne No MD  Sent: 4/4/2021 9:17 AM EDT  Subject: Prescription Question    Please renew Quentin's prescription for Sertraline HCl tablets 100 mg. [facility] [address] [place] [address].  Thank you

## 2021-06-14 ENCOUNTER — OFFICE VISIT (OUTPATIENT)
Dept: INTERNAL MEDICINE CLINIC | Age: 76
End: 2021-06-14
Payer: MEDICARE

## 2021-06-14 VITALS
RESPIRATION RATE: 13 BRPM | HEIGHT: 62 IN | TEMPERATURE: 98 F | DIASTOLIC BLOOD PRESSURE: 67 MMHG | SYSTOLIC BLOOD PRESSURE: 105 MMHG | WEIGHT: 184.6 LBS | BODY MASS INDEX: 33.97 KG/M2 | HEART RATE: 61 BPM | OXYGEN SATURATION: 97 %

## 2021-06-14 DIAGNOSIS — E11.9 CONTROLLED TYPE 2 DIABETES MELLITUS WITHOUT COMPLICATION, WITH LONG-TERM CURRENT USE OF INSULIN (HCC): ICD-10-CM

## 2021-06-14 DIAGNOSIS — I10 ESSENTIAL HYPERTENSION: Primary | ICD-10-CM

## 2021-06-14 DIAGNOSIS — Z79.4 CONTROLLED TYPE 2 DIABETES MELLITUS WITHOUT COMPLICATION, WITH LONG-TERM CURRENT USE OF INSULIN (HCC): ICD-10-CM

## 2021-06-14 DIAGNOSIS — E03.9 ACQUIRED HYPOTHYROIDISM: ICD-10-CM

## 2021-06-14 DIAGNOSIS — R41.3 MEMORY IMPAIRMENT: ICD-10-CM

## 2021-06-14 PROCEDURE — 1090F PRES/ABSN URINE INCON ASSESS: CPT | Performed by: INTERNAL MEDICINE

## 2021-06-14 PROCEDURE — G8752 SYS BP LESS 140: HCPCS | Performed by: INTERNAL MEDICINE

## 2021-06-14 PROCEDURE — G8510 SCR DEP NEG, NO PLAN REQD: HCPCS | Performed by: INTERNAL MEDICINE

## 2021-06-14 PROCEDURE — 1101F PT FALLS ASSESS-DOCD LE1/YR: CPT | Performed by: INTERNAL MEDICINE

## 2021-06-14 PROCEDURE — G8754 DIAS BP LESS 90: HCPCS | Performed by: INTERNAL MEDICINE

## 2021-06-14 PROCEDURE — 3017F COLORECTAL CA SCREEN DOC REV: CPT | Performed by: INTERNAL MEDICINE

## 2021-06-14 PROCEDURE — G8536 NO DOC ELDER MAL SCRN: HCPCS | Performed by: INTERNAL MEDICINE

## 2021-06-14 PROCEDURE — G8417 CALC BMI ABV UP PARAM F/U: HCPCS | Performed by: INTERNAL MEDICINE

## 2021-06-14 PROCEDURE — 99214 OFFICE O/P EST MOD 30 MIN: CPT | Performed by: INTERNAL MEDICINE

## 2021-06-14 PROCEDURE — G8427 DOCREV CUR MEDS BY ELIG CLIN: HCPCS | Performed by: INTERNAL MEDICINE

## 2021-06-14 PROCEDURE — 2022F DILAT RTA XM EVC RTNOPTHY: CPT | Performed by: INTERNAL MEDICINE

## 2021-06-14 PROCEDURE — G8400 PT W/DXA NO RESULTS DOC: HCPCS | Performed by: INTERNAL MEDICINE

## 2021-06-14 PROCEDURE — G0463 HOSPITAL OUTPT CLINIC VISIT: HCPCS | Performed by: INTERNAL MEDICINE

## 2021-06-14 NOTE — PROGRESS NOTES
Diagnoses and all orders for this visit:    1. Essential hypertension  Stable continue meds    2. Memory impairment   likely underlying vascular compromise  Encouraged behavioral tools/writing things down  Patient has free Eunice Lomas and was not able to recall morning blood sugar   directed conversation  and eye contact to patient to allow her to answer first  Provide support  Continue to monitor    3. Acquired hypothyroidism  Stable continue meds      4. Controlled type 2 diabetes mellitus without complication, with long-term current use of insulin (HCC)   Diarrhea/GI side effects associated with higher dose of Trulicity  Working with Dr. Shady Rehman  Consider transitioning to another GLP-1 will defer to Dr. sIsac Bro with specialists: Endocrinology, cardiology  Follow-up in 6 months or earlier if needed      Chief Complaint   Patient presents with    Hypertension    Diabetes     Nonfasting. Subjective:   Juli Verduzco is a 76 y.o. female with hypertension. Hypertension ROS: taking medications as instructed, no medication side effects noted, no TIA's, no chest pain on exertion, no dyspnea on exertion, no swelling of ankles. New concerns: None. SUBJECTIVE: Juli Verduzco is a 76 y.o. female here for follow up of hypothyroidism. Lab Results   Component Value Date/Time    TSH 1.94 01/05/2021 11:50 AM     Thyroid ROS: denies fatigue, weight changes, heat/cold intolerance, bowel/skin changes or CVS symptoms. Memory impairment   notes that patient's memory seems to wax and wane. Periods of lucid conversation but then oftentimes repeated conversations.   Discussion with patient with regards to writing things down and also using her phone as a tool for memory        Past Medical History:   Diagnosis Date    Arthritis     CAD (coronary artery disease) 2006    STENT PLACED>PLAQUE BROKE OFF--HAD \"MINOR MI\"    Deviated septum 12/20/13    HAS TROUBLE BREATHING THROUGH LEFT SIDE; CT SHOWED POLYP    Diabetes ([facility] [address]     Heart disease     Hypertension     Incontinence     Nasal polyp     Unspecified sleep apnea 2013    cpap     Past Surgical History:   Procedure Laterality Date    HX  SECTION      X3    HX COLONOSCOPY  2011    HX HEENT  ? SEPTOPLASTY    MS CARDIAC SURG PROCEDURE UNLIST      CARDIAC CATH;ANGIOPLASTY WITH STENT     Social History     Socioeconomic History    Marital status:      Spouse name: Not on file    Number of children: Not on file    Years of education: Not on file    Highest education level: Not on file   Tobacco Use    Smoking status: Former Smoker     Packs/day: 1.00     Years: 5.00     Pack years: 5.00     Quit date: 1970     Years since quittin.5    Smokeless tobacco: Never Used   Substance and Sexual Activity    Alcohol use: Yes     Comment: \"VERY LITTLE ALCOHOL\"    Drug use: No   Social History Narrative            3 children women 36, 40, 40 healthy other than skin issues     Social Determinants of Health     Financial Resource Strain:     Difficulty of Paying Living Expenses:    Food Insecurity:     Worried About Running Out of Food in the Last Year:     Ran Out of Food in the Last Year:    Transportation Needs:     Lack of Transportation (Medical):      Lack of Transportation (Non-Medical):    Physical Activity:     Days of Exercise per Week:     Minutes of Exercise per Session:    Stress:     Feeling of Stress :    Social Connections:     Frequency of Communication with Friends and Family:     Frequency of Social Gatherings with Friends and Family:     Attends [employer] Services:     Active Member of Clubs or Organizations:     Attends Club or Organization Meetings:     Marital Status:      Family History   Problem Relation Age of Onset    Heart Disease Mother     Heart Surgery Mother         VALVE REPLACED AT AGE 72    Heart Attack Father 46        MASSIVE     Anesth Problems Neg Hx      Current Outpatient Medications   Medication Sig Dispense Refill    losartan-hydroCHLOROthiazide (HYZAAR) 50-12.5 mg per tablet TAKE 1 TABLET BY MOUTH TWICE A  Tablet 0    sertraline (ZOLOFT) 100 mg tablet TAKE 1 TABLET BY MOUTH EVERY DAY IN THE EVENING 90 Tab 0    rosuvastatin (CRESTOR) 20 mg tablet TAKE 1 TABLET BY MOUTH EVERYDAY AT BEDTIME 90 Tab 1    levothyroxine (SYNTHROID) 50 mcg tablet TAKE 1 TABLET BY MOUTH EVERY DAY 90 Tab 2    ondansetron (ZOFRAN ODT) 4 mg disintegrating tablet TAKE 1 TAB BY MOUTH EVERY EIGHT (8) HOURS AS NEEDED FOR NAUSEA OR VOMITING. 20 Tab 1    loperamide (IMMODIUM) 2 mg tablet Take 2 tabs by mouth after the first loose stool of the day then take 1 tab after each bowel movement. Do not exceed 8 tablets total per day. 30 Tab 1    Trulicity 1.5 ZS/2.7 mL sub-q pen 1.5 mg by SubCUTAneous route.  metoprolol tartrate (LOPRESSOR) 25 mg tablet TAKE 1 TABLET BY MOUTH TWICE A  Tab 3    cyanocobalamin 1,000 mcg tablet Take 1,000 mcg by mouth daily.  ACCU-CHEK COMPACT PLUS TEST strp USE TO TEST BLOOD SUGAR 4 TIMES DAILY. DX E11.9 357 Strip 3    magnesium oxide (MAG-OX) 400 mg tablet TAKE 1 TAB BY MOUTH TWO (2) TIMES A DAY. 240 Tab 1    Blood Sugar Diagnostic, Drum (ACCU-CHEK COMPACT PLUS TEST) strp USE TO TEST BLOOD SUGAR 4 TIMES DAILY. DX E11.9 357 Strip 0    metFORMIN ER (GLUCOPHAGE XR) 500 mg tablet TAKE 4 TABLETS BY MOUTH EVERY DAY  3    insulin glargine (LANTUS) 100 unit/mL injection 20 Units by SubCUTAneous route two (2) times a day. (Patient taking differently: 44 Units by SubCUTAneous route daily. Sliding scale) 1 Vial 3    HUMALOG KWIKPEN 100 unit/mL kwikpen once. 30 units nightly  3    Cholecalciferol, Vitamin D3, (VITAMIN D3) 1,000 unit cap Take 1 Cap by mouth nightly. 30 Cap 3    Aspirin, Buffered 81 mg tab Take  by mouth daily.  omega-3 acid ethyl esters (LOVAZA) 1 gram capsule Take 1 g by mouth daily.       mirabegron ER (MYRBETRIQ) 50 mg ER tablet Take 50 mg by mouth daily.  (Patient not taking: Reported on 6/14/2021)       Allergies   Allergen Reactions    Adhesive Rash and Itching    Advil [Ibuprofen] Swelling     FEET SWELL    Ceftin [Cefuroxime Axetil] Itching    Lotensin [Benazepril] Cough    Penicillins Swelling    Sulfa (Sulfonamide Antibiotics) Rash, Itching and Swelling       Review of Systems - General ROS: negative for - chills or fever  Cardiovascular ROS: no chest pain or dyspnea on exertion  Respiratory ROS: no cough, shortness of breath, or wheezing    Visit Vitals  /67 (BP 1 Location: Left upper arm, BP Patient Position: Sitting, BP Cuff Size: Adult)   Pulse 61   Temp 98 °F (36.7 °C) (Oral)   Resp 13   Ht 5' 2\" (1.575 m)   Wt 184 lb 9.6 oz (83.7 kg)   SpO2 97%   BMI 33.76 kg/m²     Constitutional: [x] Appears well-developed and well-nourished [x] No apparent distress      [] Abnormal -     Mental status: [x] Alert and awake  [x] Oriented to person/place/time [x] Able to follow commands    [] Abnormal -     Eyes:   EOM    [x]  Normal    [] Abnormal -   Sclera  [x]  Normal    [] Abnormal -          Discharge [x]  None visible   [] Abnormal -     HENT: [x] Normocephalic, atraumatic  [] Abnormal -   [x] Mouth/Throat: Mucous membranes are moist    External Ears [x] Normal  [] Abnormal -    Neck: [x] No visualized mass [] Abnormal -     Pulmonary/Chest: [x] Respiratory effort normal   [x] No visualized signs of difficulty breathing or respiratory distress        [] Abnormal -      Musculoskeletal:   [x] Normal gait with no signs of ataxia         [x] Normal range of motion of neck        [] Abnormal -     Neurological:        [x] No Facial Asymmetry (Cranial nerve 7 motor function) (limited exam due to video visit)          [x] No gaze palsy        [] Abnormal -          Skin:        [x] No significant exanthematous lesions or discoloration noted on facial skin         [] Abnormal -            Psychiatric:       [x] Normal Affect [] Abnormal -        [x] No Hallucinations      ATTENTION:   This medical record was transcribed using an electronic medical records/speech recognition system. Although proofread, it may and can contain electronic, spelling and other errors. Corrections may be executed at a later time. Please contact us for any clarifications as needed.

## 2021-07-08 ENCOUNTER — TRANSCRIBE ORDER (OUTPATIENT)
Dept: SCHEDULING | Age: 76
End: 2021-07-08

## 2021-07-08 DIAGNOSIS — R91.8 PULMONARY NODULES: Primary | ICD-10-CM

## 2021-07-14 ENCOUNTER — TELEPHONE (OUTPATIENT)
Dept: SLEEP MEDICINE | Age: 76
End: 2021-07-14

## 2021-07-14 ENCOUNTER — VIRTUAL VISIT (OUTPATIENT)
Dept: SLEEP MEDICINE | Age: 76
End: 2021-07-14
Payer: MEDICARE

## 2021-07-14 DIAGNOSIS — E11.21 TYPE 2 DIABETES WITH NEPHROPATHY (HCC): ICD-10-CM

## 2021-07-14 DIAGNOSIS — G47.33 OBSTRUCTIVE SLEEP APNEA (ADULT) (PEDIATRIC): Primary | ICD-10-CM

## 2021-07-14 DIAGNOSIS — I10 ESSENTIAL HYPERTENSION WITH GOAL BLOOD PRESSURE LESS THAN 130/80: ICD-10-CM

## 2021-07-14 PROCEDURE — G8400 PT W/DXA NO RESULTS DOC: HCPCS | Performed by: NURSE PRACTITIONER

## 2021-07-14 PROCEDURE — G8432 DEP SCR NOT DOC, RNG: HCPCS | Performed by: NURSE PRACTITIONER

## 2021-07-14 PROCEDURE — G8417 CALC BMI ABV UP PARAM F/U: HCPCS | Performed by: NURSE PRACTITIONER

## 2021-07-14 PROCEDURE — 3052F HG A1C>EQUAL 8.0%<EQUAL 9.0%: CPT | Performed by: NURSE PRACTITIONER

## 2021-07-14 PROCEDURE — G8427 DOCREV CUR MEDS BY ELIG CLIN: HCPCS | Performed by: NURSE PRACTITIONER

## 2021-07-14 PROCEDURE — G8536 NO DOC ELDER MAL SCRN: HCPCS | Performed by: NURSE PRACTITIONER

## 2021-07-14 PROCEDURE — 1090F PRES/ABSN URINE INCON ASSESS: CPT | Performed by: NURSE PRACTITIONER

## 2021-07-14 PROCEDURE — G8756 NO BP MEASURE DOC: HCPCS | Performed by: NURSE PRACTITIONER

## 2021-07-14 PROCEDURE — 1101F PT FALLS ASSESS-DOCD LE1/YR: CPT | Performed by: NURSE PRACTITIONER

## 2021-07-14 PROCEDURE — 99213 OFFICE O/P EST LOW 20 MIN: CPT | Performed by: NURSE PRACTITIONER

## 2021-07-14 NOTE — PROGRESS NOTES
[address] [facility] [place]. [place], [address]   Tel.  [phone]   Fax. [address] [facility]   [place], [address] [facility]   Tel.  [phone]   Fax. [phone] [address] [facility] Kacie Campos   Tel.  [phone]   Fax. Julian Chan (: 1945) is a 68 y.o. female, established patient, seen for positive airway pressure follow-up, she was last seen by me on 2021, previously  seen by Dr. Aislinn Little 2019, prior notes reviewed in detail.  Home sleep test 3/2016 showed AHI of 18.6/hr with a lowest SaO2 of 71%, duration of SaO2 < 88% 125 min. She was COVID + in 2020. ASSESSMENT/PLAN:    ICD-10-CM ICD-9-CM    1. Obstructive sleep apnea (adult) (pediatric)  G47.33 327.23 POLYSOMNOGRAPHY 1 NIGHT   2. Essential hypertension with goal blood pressure less than 130/80  I10 401.9    3. Type 2 diabetes with nephropathy (HCC)  E11.21 250.40      583.81    4. Adult BMI 33.0-33.9 kg/sq m  Z68.33 V85.33          AHI = 18.6(3/2016). On APAP, Respironics : 5-10  cmH2O. Set up 5/3/2016. She is not compliant with PAP therapy although when used PAP shows benefit to the patient and remains necessary for control of her sleep apnea. Her device is affected by the Robert recall. She is interested in re-evaluation due to weight loss since prior testing. Follow-up and Dispositions    · Return if symptoms worsen or fail to improve. 1. Sleep Apnea - We have discussed the Robert Respironics device recall, the need to register the device with Robert, and I have advised positional therapy if PAP therapy is stopped. * in lab sleep testing ordered for re-evaluation    Orders Placed This Encounter    POLYSOMNOGRAPHY 1 NIGHT     Re-evaluation due to weight loss     Standing Status:   Future     Standing Expiration Date:   2022     Scheduling Instructions:      Please route to Dr. Jeanie Hernandez for interp.      Order Specific Question: Reason for Exam     Answer:   TREVIN       2. Hypertension -  continue on her current regimen, she will continue to monitor her BP and follow up with her PMD for reevaluation/adjustment of medications if warranted. I have reviewed the relationship between hypertension as it relates to sleep-disordered breathing. 3. Type II diabetes -  she continues on her current regimen. I have reviewed the relationship between sleep disordered breathing as it relates to diabetes. 4.  Encouraged continued weight management program through appropriate diet and exercise regimen as significant weight reduction has been shown to reduce severity of obstructive sleep apnea. She has lost 20 pounds over the past 18 months. SUBJECTIVE/OBJECTIVE:    She  is seen today for follow up on PAP device and reports she has not been using the device recently, since recovering from [facility]. She notes that she feels she is sleeping well. Her bed partner reports she is still snoring. She has lost 20 pounds since prior testing. Auto pressure: 5-10 cmH2O; Peak Avg Pressure: 5.7 cmH2O;  Avg. Device Pressure <= 90 %: 6.0 cmH2O   Average % Night in Large Leak:  45.3  CMS Compliance % Used Days >= 4 hours: 7. Average daily use of 3 hours. Therapy Apnea Index averaged over PAP use: 2.0 /hr which reflects improved sleep breathing condition. [place] Sleepiness Score: 10 and Modified F.O.S.Q. Score Total / 2: 19.5 which reflects improved sleep quality over therapy time. Sleep Review of Systems: notable for Negative difficulty falling asleep; Positive awakenings at night; Negative early morning headaches; Negative memory problems; Negative concentration issues;  Negative chest pain; Negative shortness of breath; Negative significant joint pain at night; Negative significant muscle pain at night; Negative rashes or itching; Negative heartburn; Negative significant mood issues; occasional afternoon naps per week    Vitals reported by patient     Patient-Reported Vitals 7/14/2021   Patient-Reported Weight 184lb   Patient-Reported Height -   Patient-Reported Pulse -   Patient-Reported Temperature -   Patient-Reported Systolic  948   Patient-Reported Diastolic 67      Calculated BMI 33    Physical Exam completed by visual and auditory observation of patient with verbal input from patient. General:   Alert, oriented, not in acute distress   Eyes:  Anicteric Sclerae; no obvious strabismus   Nose:  No obvious nasal septum deviation    Neck:   Midline trachea, no visible mass   Chest/Lungs:  Respiratory effort normal, no visualized signs of difficulty breathing or respiratory distress   CVS:  No JVD   Extremities:  No obvious rashes noted on face, neck, or hands   Neuro:  No facial asymmetry, no focal deficits; no obvious tremor    Psych:  Normal affect,  normal countenance     Ania Oleary is being evaluated by a Virtual Visit (video visit) encounter to address concerns as mentioned above. A caregiver was present when appropriate. Due to this being a TeleHealth encounter (During EYKMY-59 public health emergency), evaluation of the following organ systems was limited: Vitals/Constitutional/EENT/Resp/CV/GI//MS/Neuro/Skin/Heme-Lymph-Imm. Pursuant to the emergency declaration under the [facility], [address] [place] [address] authority and the [employer] Act, this Virtual Visit was conducted with patient's (and/or legal guardian's) consent, to reduce the patient's risk of exposure to COVID-19 and provide necessary medical care. Patient identification was verified at the start of the visit: YES using name and date of birth. Patient's phone number [phone] (home)  was confirmed for accuracy. She gives permission for messages regarding results and appointments to be left at that number.     Services were provided through a video synchronous discussion virtually to substitute for in-person clinic visit. I was in the office while conducting this encounter, patient located at their home or alternate location of their choice. An electronic signature was used to authenticate this note.     -- Bernard Ku NP, [facility], [facility]  07/14/21

## 2021-07-14 NOTE — PATIENT INSTRUCTIONS
[address] [facility] [place]. [place], [address]  Tel.  [phone]  Fax. [address] [facility] [address]  [place], [address] [facility]  Tel.  [phone]  Fax. [phone] [address]  Tel.  [phone]  Fax. [phone]     Learning About CPAP for Sleep Apnea  What is CPAP? CPAP is a small machine that you use at home every night while you sleep. It increases air pressure in your throat to keep your airway open. When you have sleep apnea, this can help you sleep better so you feel much better. CPAP stands for \"continuous positive airway pressure. \"  The CPAP machine will have one of the following:  · A mask that covers your nose and mouth  · Prongs that fit into your nose  · A mask that covers your nose only, the most common type. This type is called NCPAP. The N stands for \"nasal.\"  Why is it done? CPAP is usually the best treatment for obstructive sleep apnea. It is the first treatment choice and the most widely used. Your doctor may suggest CPAP if you have:  · Moderate to severe sleep apnea. · Sleep apnea and coronary artery disease (CAD) or heart failure. How does it help? · CPAP can help you have more normal sleep, so you feel less sleepy and more alert during the daytime. · CPAP may help keep heart failure or other heart problems from getting worse. · NCPAP may help lower your blood pressure. · If you use CPAP, your bed partner may also sleep better because you are not snoring or restless. What are the side effects? Some people who use CPAP have:  · A dry or stuffy nose and a sore throat. · Irritated skin on the face. · Sore eyes. · Bloating. If you have any of these problems, work with your doctor to fix them. Here are some things you can try:  · Be sure the mask or nasal prongs fit well. · See if your doctor can adjust the pressure of your CPAP. · If your nose is dry, try a humidifier.   · If your nose is runny or stuffy, try decongestant medicine or a steroid nasal spray. If these things do not help, you might try a different type of machine. Some machines have air pressure that adjusts on its own. Others have air pressures that are different when you breathe in than when you breathe out. This may reduce discomfort caused by too much pressure in your nose. Where can you learn more? Go to Addepar.be  Enter Shanna Lewis in the search box to learn more about \"Learning About CPAP for Sleep Apnea. \"   © 1636-5646 Healthwise, Incorporated. Care instructions adapted under license by [facility] [employer] (which disclaims liability or warranty for this information). This care instruction is for use with your licensed healthcare professional. If you have questions about a medical condition or this instruction, always ask your healthcare professional. [address] any warranty or liability for your use of this information. Content Version: 1.0.00943; Last Revised: January 11, 2010  PROPER SLEEP HYGIENE    What to avoid  · Do not have drinks with caffeine, such as coffee or black tea, for 8 hours before bed. · Do not smoke or use other types of tobacco near bedtime. Nicotine is a stimulant and can keep you awake. · Avoid drinking alcohol late in the evening, because it can cause you to wake in the middle of the night. · Do not eat a big meal close to bedtime. If you are hungry, eat a light snack. · Do not drink a lot of water close to bedtime, because the need to urinate may wake you up during the night. · Do not read or watch TV in bed. Use the bed only for sleeping and sexual activity. What to try  · Go to bed at the same time every night, and wake up at the same time every morning. Do not take naps during the day. · Keep your bedroom quiet, dark, and cool. · Get regular exercise, but not within 3 to 4 hours of your bedtime. .  · Sleep on a comfortable pillow and mattress.   · If watching the clock makes you anxious, turn it facing away from you so you cannot see the time. · If you worry when you lie down, start a worry book. Well before bedtime, write down your worries, and then set the book and your concerns aside. · Try meditation or other relaxation techniques before you go to bed. · If you cannot fall asleep, get up and go to another room until you feel sleepy. Do something relaxing. Repeat your bedtime routine before you go to bed again. · Make your house quiet and calm about an hour before bedtime. Turn down the lights, turn off the TV, log off the computer, and turn down the volume on music. This can help you relax after a busy day. Drowsy Driving: The [facility] 54 cites drowsiness as a causing factor in more than 822,049 police reported crashes annually, resulting in 76,000 injuries and 1,500 deaths. Other surveys suggest 55% of people polled have driven while drowsy in the past year, 23% had fallen asleep but not crashed, 3% crashed, and 2% had and accident due to drowsy driving. Who is at risk? Young Drivers: One study of drowsy driving accidents states that 55% of the drivers were under 25 years. Of those, 75% were male. Shift Workers and Travelers: People who work overnight or travel across time zones frequently are at higher risk of experiencing Circadian Rhythm Disorders. They are trying to work and function when their body is programed to sleep. Sleep Deprived: Lack of sleep has a serious impact on your ability to pay attention or focus on a task. Consistently getting less than the average of 8 hours your body needs creates partial or cumulative sleep deprivation. Untreated Sleep Disorders: Sleep Apnea, Narcolepsy, R.L.S., and other sleep disorders (untreated) prevent a person from getting enough restful sleep. This leads to excessive daytime sleepiness and increases the risk for drowsy driving accidents by up to 7 times.   Medications / Alcohol: Even over the counter medications can cause drowsiness. Medications that impair a drivers attention should have a warning label. Alcohol naturally makes you sleepy and on its own can cause accidents. Combined with excessive drowsiness its effects are amplified. Signs of Drowsy Driving:   * You don't remember driving the last few miles   * You may drift out of your jerry   * You are unable to focus and your thoughts wander   * You may yawn more often than normal   * You have difficulty keeping your eyes open / nodding off   * Missing traffic signs, speeding, or tailgating  Prevention-   Good sleep hygiene, lifestyle and behavioral choices have the most impact on drowsy driving. There is no substitute for sleep and the average person requires 8 hours nightly. If you find yourself driving drowsy, stop and sleep. Consider the sleep hygiene tips provided during your visit as well. Medication Refill Policy: Refills for all medications require 1 week advance notice. Please have your pharmacy fax a refill request. We are unable to fax, or call in \"controled substance\" medications and you will need to pick these prescriptions up from our office. [facility] Activation    Thank you for requesting access to [facility]. Please follow the instructions below to securely access and download your online medical record. [facility] allows you to send messages to your doctor, view your test results, renew your prescriptions, schedule appointments, and more. How Do I Sign Up? 1. In your internet browser, go to [email or web address]. 2. Click on the First Time User? Click Here link in the Sign In box. You will see the New Member Sign Up page. 3. Enter your [facility] Access Code exactly as it appears below. You will not need to use this code after youve completed the sign-up process. If you do not sign up before the expiration date, you must request a new code. [facility] Access Code:  Activation code not generated  Current [facility] Status: Active (This is the date your [facility] access code will )    4. Enter the last four digits of your Social Security Number (xxxx) and Date of Birth (mm/dd/yyyy) as indicated and click Submit. You will be taken to the next sign-up page. 5. Create a [employer] ID. This will be your [facility] login ID and cannot be changed, so think of one that is secure and easy to remember. 6. Create a [facility] password. You can change your password at any time. 7. Enter your Password Reset Question and Answer. This can be used at a later time if you forget your password. 8. Enter your e-mail address. You will receive e-mail notification when new information is available in [address] 9. Click Sign Up. You can now view and download portions of your medical record. 10. Click the Download Summary menu link to download a portable copy of your medical information. Additional Information    If you have questions, please call [phone]. Remember, [facility] is NOT to be used for urgent needs. For medical emergencies, dial 911.

## 2021-07-23 DIAGNOSIS — U07.1 COVID-19: ICD-10-CM

## 2021-07-23 RX ORDER — ROSUVASTATIN CALCIUM 20 MG/1
TABLET, COATED ORAL
Qty: 90 TABLET | Refills: 1 | Status: SHIPPED | OUTPATIENT
Start: 2021-07-23 | End: 2021-12-21

## 2021-07-25 RX ORDER — ONDANSETRON 4 MG/1
4 TABLET, ORALLY DISINTEGRATING ORAL
Qty: 20 TABLET | Refills: 1 | Status: SHIPPED | OUTPATIENT
Start: 2021-07-25 | End: 2021-12-04

## 2021-08-11 ENCOUNTER — HOSPITAL ENCOUNTER (OUTPATIENT)
Dept: SLEEP MEDICINE | Age: 76
Discharge: HOME | End: 2021-08-11
Payer: MEDICARE

## 2021-08-11 VITALS
OXYGEN SATURATION: 94 % | WEIGHT: 186 LBS | TEMPERATURE: 98 F | SYSTOLIC BLOOD PRESSURE: 113 MMHG | BODY MASS INDEX: 34.23 KG/M2 | DIASTOLIC BLOOD PRESSURE: 57 MMHG | HEART RATE: 73 BPM | HEIGHT: 62 IN

## 2021-08-11 PROCEDURE — 95810 POLYSOM 6/> YRS 4/> PARAM: CPT | Performed by: INTERNAL MEDICINE

## 2021-08-12 NOTE — PROGRESS NOTES
[address] [place]. [place], [address]  Tel.  [phone]  Fax. [address] [facility] [address]  [place], [address] [facility]  Tel.  [phone]  Fax. [phone] [address] [facility] Kaice Campos  Tel.  [phone]  Fax. [phone]     Sleep Study Technical Notes        PRE-Test:  Minoo You (: 1945) arrived in the lobby. Patient was greeted, temperature checked (98.0) and screening questions asked. The patient was taken to the Sleep Center and taken directly to his/her room. BP (113/57) and SaO2 (94%) were taken. Weight per patient (186 LBS). Procedure explained to the patient and questions were answered. The patient expressed understanding of the procedure. Electrodes were applied without incident. The patient was placed in bed and the study was started.  PAP mask acclimation:  NA    Acquisition Notes:   Lights off: 10:22 PM   Respiratory events: RERAS, HYPOPNEAS, OBSTRUCTIVE APNEAS AND A FEW CENTRAL APNEAS   ECG:  NORMAL SINUS RHYTHM   PAP titration:    Desensitization Mask(s) Used: NA   Other comments: NA  o Patient had caregiver in attendance:  NO  o Patient watched TV or on phone after lights out:  NO  o Patient slept with positional therapy: NO  o Patient slept with head of bed elevated:  NO  o Patient wore an oral appliance: NO  o Patient to bathroom 2 times  o Pediatric Patient:  NA  - Parent accompanied patient:  NO  - Parent slept in bed with patient:  NO      POST Test:   Patient was awakened. Electrodes were removed. The patient was discharged after answering the Post Questionnaire. Patient stated that he/she was alert and ok to drive.  Equipment and room cleaned per infection control policy.

## 2021-08-16 DIAGNOSIS — I10 ESSENTIAL HYPERTENSION: ICD-10-CM

## 2021-08-16 RX ORDER — LOSARTAN POTASSIUM AND HYDROCHLOROTHIAZIDE 12.5; 5 MG/1; MG/1
TABLET ORAL
Qty: 180 TABLET | Refills: 0 | Status: SHIPPED | OUTPATIENT
Start: 2021-08-16 | End: 2021-11-08

## 2021-08-17 ENCOUNTER — OFFICE VISIT (OUTPATIENT)
Dept: CARDIOLOGY CLINIC | Age: 76
End: 2021-08-17
Payer: MEDICARE

## 2021-08-17 VITALS
SYSTOLIC BLOOD PRESSURE: 120 MMHG | HEIGHT: 62 IN | OXYGEN SATURATION: 97 % | DIASTOLIC BLOOD PRESSURE: 70 MMHG | BODY MASS INDEX: 34.23 KG/M2 | RESPIRATION RATE: 14 BRPM | HEART RATE: 60 BPM | WEIGHT: 186 LBS

## 2021-08-17 DIAGNOSIS — I25.10 CORONARY ARTERY DISEASE DUE TO LIPID RICH PLAQUE: Primary | ICD-10-CM

## 2021-08-17 DIAGNOSIS — I51.9 HEART DISEASE: ICD-10-CM

## 2021-08-17 DIAGNOSIS — I25.83 CORONARY ARTERY DISEASE DUE TO LIPID RICH PLAQUE: Primary | ICD-10-CM

## 2021-08-17 DIAGNOSIS — I10 ESSENTIAL HYPERTENSION: ICD-10-CM

## 2021-08-17 PROCEDURE — 1090F PRES/ABSN URINE INCON ASSESS: CPT | Performed by: INTERNAL MEDICINE

## 2021-08-17 PROCEDURE — G8400 PT W/DXA NO RESULTS DOC: HCPCS | Performed by: INTERNAL MEDICINE

## 2021-08-17 PROCEDURE — G0463 HOSPITAL OUTPT CLINIC VISIT: HCPCS | Performed by: INTERNAL MEDICINE

## 2021-08-17 PROCEDURE — 93010 ELECTROCARDIOGRAM REPORT: CPT | Performed by: INTERNAL MEDICINE

## 2021-08-17 PROCEDURE — G8536 NO DOC ELDER MAL SCRN: HCPCS | Performed by: INTERNAL MEDICINE

## 2021-08-17 PROCEDURE — G8427 DOCREV CUR MEDS BY ELIG CLIN: HCPCS | Performed by: INTERNAL MEDICINE

## 2021-08-17 PROCEDURE — 99214 OFFICE O/P EST MOD 30 MIN: CPT | Performed by: INTERNAL MEDICINE

## 2021-08-17 PROCEDURE — 93005 ELECTROCARDIOGRAM TRACING: CPT | Performed by: INTERNAL MEDICINE

## 2021-08-17 PROCEDURE — G8510 SCR DEP NEG, NO PLAN REQD: HCPCS | Performed by: INTERNAL MEDICINE

## 2021-08-17 PROCEDURE — G8752 SYS BP LESS 140: HCPCS | Performed by: INTERNAL MEDICINE

## 2021-08-17 PROCEDURE — 1101F PT FALLS ASSESS-DOCD LE1/YR: CPT | Performed by: INTERNAL MEDICINE

## 2021-08-17 PROCEDURE — G8417 CALC BMI ABV UP PARAM F/U: HCPCS | Performed by: INTERNAL MEDICINE

## 2021-08-17 PROCEDURE — G8754 DIAS BP LESS 90: HCPCS | Performed by: INTERNAL MEDICINE

## 2021-08-17 RX ORDER — DULAGLUTIDE 3 MG/.5ML
INJECTION, SOLUTION SUBCUTANEOUS
COMMUNITY
End: 2022-08-17

## 2021-08-17 NOTE — PROGRESS NOTES
[place]: Davene Bence  [phone]          Cardiology Progress Note      Requesting/referring provider: Dr. Blackwell No  Reason for Consult: Coronary artery disease    HPI: Rachel Barbosa, a 68y.o. year-old who presents for evaluation of elevated calcium score. Mrs. Amy Rowe has history of hypertension diabetes and coronary artery disease. Apparently in 2004 she underwent a cardiac catheterization was suspected to have possible dissection requiring management with placement of a drug-eluting stent at [facility] by Dr. Jesse Shanks. She has subsequently not been cathed. Currently she denies any symptoms of chest pain but is easily fatigued and often short of breath with activity. Her day-to-day activity level is pretty low. Recently she underwent a coronary calcium/Agatson score which was significantly elevated close to about 2000. She is here to discuss the results. In 2019 summer, patient was in [place] and had 2 episodes of chest pain for which she had to visit hospital ER. Troponins were negative at that time and they recommended further follow-up with cardiology. She was suggested to undergo a stress test at that time but declined. Over the past 6 months she reports no new episodes of chest discomfort and has done very well. She continues to have exercise intolerance and is morbidly obese and has not been able to lose weight though. ECG: August 2020 sinuss bradycardia nonspecific ST-T changes  Echocardiogram February 2020: Normal LV systolic function, mild LVH, no significant valvular lesions  Lab Results   Component Value Date/Time    Cholesterol, total 135 09/06/2019 10:18 AM    HDL Cholesterol 51 09/06/2019 10:18 AM    LDL, calculated 43 09/06/2019 10:18 AM    VLDL, calculated 41 (H) 09/06/2019 10:18 AM    Triglyceride 207 (H) 09/06/2019 10:18 AM       Assessment/Plan:  1. Coronary artery disease manifested in the form   A.   Possible ACS versus coronary dissection in 2004  B. Severe single-vessel disease involving RCA based on history  C. Normal LV systolic function  D. No known subsequent residual disease  E.  Currently no residual angina or heart failure symptoms    2. Functional intolerance/exercise intolerance potentially related to advancing coronary artery disease  3. Hypertension essential--well controlled  4. Morbid obesity with sleep apnea  5. Type 2 diabetes mellitus  6. Sinus bradycardia with higher doses of beta-blocker and her endocrinologist is working on finding better and lower dose of lisinopril    Mrs. Quentin Kumar was seen with her  for discussion regarding coronary artery disease. She had elevated calcium scoring prior balloon angioplasty with Dr. Meghan Gutierrez about 20 years ago. Off-and-on she has atypical chest discomfort for the past 6 months she is angina free. I believe she has underlying likely significant coronary disease but currently that appears to be not causing any symptoms and can be managed medically. She is on optimal medical therapy at this time but due to sinus bradycardia and tiredness,, no-dose of beta-blocker metoprolol to 25 mg twice a day. Chronic cardiovascular problems such as hypertension, diabetes are currently well controlled. She has lost about 10 pounds in the past 12 months. Endo is working on reducing insulin doses.  will get blood pressure record at home and will bring the blood pressure log next visit. We will see her back in 12 months.        []    High complexity decision making was performed  []    Patient is at high-risk of decompensation with multiple organ involvement  She  has a past medical history of Arthritis, CAD (coronary artery disease) (2006), Deviated septum (12/20/13), Diabetes ([address] Heart disease, Hypertension, Incontinence, Nasal polyp, and Unspecified sleep apnea (12/20/2013). Review of system:Patient reports no PND/Orthpnea/CP. Reports no pedal edema.   Over the past few years her weight has significantly increased. Diabetes control is marginal at best.  He reports no cough/fever/focal neurological deficits/abdominal pain. All other systems negative except as above. Family History   Problem Relation Age of Onset    Heart Disease Mother     Heart Surgery Mother         VALVE REPLACED AT AGE 72    Heart Attack Father 46        MASSIVE     Anesth Problems Neg Hx       Social History     Socioeconomic History    Marital status:      Spouse name: Not on file    Number of children: Not on file    Years of education: Not on file    Highest education level: Not on file   Tobacco Use    Smoking status: Former Smoker     Packs/day: 1.00     Years: 5.00     Pack years: 5.00     Quit date: 1970     Years since quittin.6    Smokeless tobacco: Never Used   Substance and Sexual Activity    Alcohol use: Not Currently    Drug use: No   Other Topics Concern   Social History Narrative            3 children women 36, 40, 37 healthy other than skin issues     Social Determinants of Health     Financial Resource Strain:     Difficulty of Paying Living Expenses:    Food Insecurity:     Worried About Running Out of Food in the Last Year:     Ran Out of Food in the Last Year:    Transportation Needs:     Lack of Transportation (Medical):      Lack of Transportation (Non-Medical):    Physical Activity:     Days of Exercise per Week:     Minutes of Exercise per Session:    Stress:     Feeling of Stress :    Social Connections:     Frequency of Communication with Friends and Family:     Frequency of Social Gatherings with Friends and Family:     Attends Mu-ism Services:     Active Member of Clubs or Organizations:     Attends Club or Organization Meetings:     Marital Status:       PE  Vitals:    21 1319   BP: 120/70   Pulse: 60   Resp: 14   SpO2: 97%   Weight: 186 lb (84.4 kg)   Height: 5' 2\" (1.575 m)    Body mass index is 34.02 kg/m². General:    Alert, cooperative, no distress. Morbidly obese   Psychiatric:    Normal Mood and affect    Eye/ENT:      Pupils equal, No asymmetry, Conjunctival slightly pale. Able to hear voice at normal amplitude   Lungs:      Visibly symmetric chest expansion, No palpable tenderness. Clear to auscultation bilaterally. Heart[de-identified]    Regular rate and rhythm, S1, S2 normal, no murmur, click, rub or gallop. No JVD, Normal palpable peripheral pulses. No cyanosis   Abdomen:     Soft, non-tender. Bowel sounds normal. No masses,  No      organomegaly. Extremities:   Extremities normal, atraumatic, no edema. Neurologic:   CN II-XII grossly intact.  No gross focal deficits           Recent Labs:  Lab Results   Component Value Date/Time    Cholesterol, total 135 09/06/2019 10:18 AM    HDL Cholesterol 51 09/06/2019 10:18 AM    LDL, calculated 43 09/06/2019 10:18 AM    Triglyceride 207 (H) 09/06/2019 10:18 AM     Lab Results   Component Value Date/Time    Creatinine 1.06 (H) 01/05/2021 11:50 AM     Lab Results   Component Value Date/Time    BUN 26 (H) 01/05/2021 11:50 AM     Lab Results   Component Value Date/Time    Potassium 4.0 01/05/2021 11:50 AM     Lab Results   Component Value Date/Time    Hemoglobin A1c 8.2 (H) 01/05/2021 11:50 AM    Hemoglobin A1c, External 7.7 08/01/2019 12:00 AM     Lab Results   Component Value Date/Time    HGB 12.6 11/18/2020 06:36 PM     Lab Results   Component Value Date/Time    PLATELET 966 82/12/8637 06:36 PM       Reviewed:  Past Medical History:   Diagnosis Date    Arthritis     CAD (coronary artery disease) 2006    STENT PLACED>PLAQUE BROKE OFF--HAD \"MINOR MI\"    Deviated septum 12/20/13    HAS TROUBLE BREATHING THROUGH LEFT SIDE; CT SHOWED POLYP    Diabetes ([address]     Heart disease     Hypertension     Incontinence     Nasal polyp     Unspecified sleep apnea 12/20/2013    cpap     Social History     Tobacco Use   Smoking Status Former Smoker    Packs/day: 1.00    Years: 5.00    Pack years: 5.00    Quit date: 1970    Years since quittin.6   Smokeless Tobacco Never Used     Social History     Substance and Sexual Activity   Alcohol Use Not Currently     Allergies   Allergen Reactions    Adhesive Rash and Itching    Advil [Ibuprofen] Swelling     FEET SWELL    Ceftin [Cefuroxime Axetil] Itching    Lotensin [Benazepril] Cough    Penicillins Swelling    Sulfa (Sulfonamide Antibiotics) Rash, Itching and Swelling     Family History   Problem Relation Age of Onset    Heart Disease Mother     Heart Surgery Mother         VALVE REPLACED AT AGE 72    Heart Attack Father 46        MASSIVE     Anesth Problems Neg Hx         Current Outpatient Medications   Medication Sig    dapagliflozin (Farxiga) 5 mg tab tablet Take 5 mg by mouth daily.  dulaglutide (Trulicity) 3 OL/5.5 mL pnij by SubCUTAneous route every seven (7) days.  losartan-hydroCHLOROthiazide (HYZAAR) 50-12.5 mg per tablet TAKE 1 TABLET BY MOUTH TWICE A DAY    ondansetron (ZOFRAN ODT) 4 mg disintegrating tablet TAKE 1 TAB BY MOUTH EVERY EIGHT (8) HOURS AS NEEDED FOR NAUSEA OR VOMITING.  rosuvastatin (CRESTOR) 20 mg tablet TAKE 1 TABLET BY MOUTH EVERYDAY AT BEDTIME    sertraline (ZOLOFT) 100 mg tablet TAKE 1 TABLET BY MOUTH EVERY DAY IN THE EVENING    levothyroxine (SYNTHROID) 50 mcg tablet TAKE 1 TABLET BY MOUTH EVERY DAY    loperamide (IMMODIUM) 2 mg tablet Take 2 tabs by mouth after the first loose stool of the day then take 1 tab after each bowel movement. Do not exceed 8 tablets total per day.  metoprolol tartrate (LOPRESSOR) 25 mg tablet TAKE 1 TABLET BY MOUTH TWICE A DAY    cyanocobalamin 1,000 mcg tablet Take 1,000 mcg by mouth daily.  ACCU-CHEK COMPACT PLUS TEST strp USE TO TEST BLOOD SUGAR 4 TIMES DAILY. DX E11.9    magnesium oxide (MAG-OX) 400 mg tablet TAKE 1 TAB BY MOUTH TWO (2) TIMES A DAY.     Blood Sugar Diagnostic, Drum (ACCU-CHEK COMPACT PLUS TEST) strp USE TO TEST BLOOD SUGAR 4 TIMES DAILY. DX E11.9    metFORMIN ER (GLUCOPHAGE XR) 500 mg tablet Take 1,000 mg by mouth two (2) times a day.  insulin glargine (LANTUS) 100 unit/mL injection 20 Units by SubCUTAneous route two (2) times a day. (Patient taking differently: by SubCUTAneous route three (3) times daily (with meals). Sliding scale)    HUMALOG KWIKPEN 100 unit/mL kwikpen 28 Units by SubCUTAneous route nightly. Sliding scale    Cholecalciferol, Vitamin D3, (VITAMIN D3) 1,000 unit cap Take 1 Cap by mouth nightly.  Aspirin, Buffered 81 mg tab Take  by mouth daily.  omega-3 acid ethyl esters (LOVAZA) 1 gram capsule Take 4 g by mouth daily.  Trulicity 1.5 WA/0.2 mL sub-q pen 1.5 mg by SubCUTAneous route. No current facility-administered medications for this visit. MD Lisa08/17/21 There are other unrelated non-urgent complaints, but due to the busy schedule and the amount of time I've already spent with her, time does not permit me to address these routine issues at today's visit. I've requested another appointment to review these additional issues.     [address] [facility] [place]  [address], [address], [address] [place] [address]

## 2021-08-17 NOTE — LETTER
8/17/2021    Patient: Ben Rodriguez   YOB: 1945   Date of Visit: 8/17/2021     Matilde Davis, [address] [facility] [address]  [address] [facility]  Via In Basket    Dear Mtailde Davis MD,      Thank you for referring Ms. He Rome to [facility] for evaluation. My notes for this consultation are attached. If you have questions, please do not hesitate to call me. I look forward to following your patient along with you.       Sincerely,    Mary Butts MD

## 2021-08-31 ENCOUNTER — TELEPHONE (OUTPATIENT)
Dept: SLEEP MEDICINE | Age: 76
End: 2021-08-31

## 2021-08-31 ENCOUNTER — DOCUMENTATION ONLY (OUTPATIENT)
Dept: SLEEP MEDICINE | Age: 76
End: 2021-08-31

## 2021-08-31 DIAGNOSIS — G47.33 OBSTRUCTIVE SLEEP APNEA (ADULT) (PEDIATRIC): Primary | ICD-10-CM

## 2021-08-31 NOTE — TELEPHONE ENCOUNTER
Oneyda Nino is to be contacted by lead sleep technologist regarding results of Sleep Testing which was indicative of an average AHI of 24.4 per hour with an SpO2 johanna of 81%, the duration of SpO2 <88% was 8.1 minutes. * A second polysomnogram has been ordered for mask fitting, PAP desensitizing protocol, and pressure titration. Patient should be educated on the risks versus benefits of this elective sleep testing procedure being done during the pandemic and their consent be obtained. * Follow-up appointment will be scheduled 8-12 weeks following PAP initiation to gauge treatment response and compliance. Encounter Diagnosis   Name Primary?     Obstructive sleep apnea (adult) (pediatric) Yes       Orders Placed This Encounter    SLEEP LAB (PAP TITRATION)     Standing Status:   Future     Standing Expiration Date:   11/30/2021     Order Specific Question:   Reason for Exam     Answer:   TREVIN

## 2021-08-31 NOTE — TELEPHONE ENCOUNTER
The sleep study is an elective procedure during a pandemic. We are taking ever precaution to keep you safe. Our staff are following the recommendations according to the CDC which include environmental cleaning and disinfection; PPE--including wearing masks upon entry and additional PPE when appropriate and social distancing. Reviewed sleep study results with patient. She expressed understanding and is willing to proceed with a CPAP Titration. Please schedule titration study.   Patient has been vaccined    Thanks

## 2021-09-20 DIAGNOSIS — E03.9 ACQUIRED HYPOTHYROIDISM: ICD-10-CM

## 2021-09-20 RX ORDER — LEVOTHYROXINE SODIUM 50 UG/1
TABLET ORAL
Qty: 90 TABLET | Refills: 2 | Status: SHIPPED | OUTPATIENT
Start: 2021-09-20

## 2021-09-21 RX ORDER — SERTRALINE HYDROCHLORIDE 100 MG/1
TABLET, FILM COATED ORAL
Qty: 90 TABLET | Refills: 0 | Status: SHIPPED | OUTPATIENT
Start: 2021-09-21 | End: 2021-12-04

## 2021-10-04 ENCOUNTER — HOSPITAL ENCOUNTER (OUTPATIENT)
Dept: SLEEP MEDICINE | Age: 76
Discharge: HOME | End: 2021-10-04
Payer: MEDICARE

## 2021-10-04 VITALS
HEART RATE: 72 BPM | HEIGHT: 62 IN | WEIGHT: 186 LBS | DIASTOLIC BLOOD PRESSURE: 63 MMHG | TEMPERATURE: 98.2 F | BODY MASS INDEX: 34.23 KG/M2 | OXYGEN SATURATION: 95 % | SYSTOLIC BLOOD PRESSURE: 108 MMHG

## 2021-10-04 DIAGNOSIS — G47.33 OBSTRUCTIVE SLEEP APNEA (ADULT) (PEDIATRIC): ICD-10-CM

## 2021-10-04 PROCEDURE — 95811 POLYSOM 6/>YRS CPAP 4/> PARM: CPT | Performed by: INTERNAL MEDICINE

## 2021-10-05 ENCOUNTER — DOCUMENTATION ONLY (OUTPATIENT)
Dept: SLEEP MEDICINE | Age: 76
End: 2021-10-05

## 2021-10-05 NOTE — PROGRESS NOTES
[address] [facility]. [place], [address]  Tel.  [phone]  Fax. 100 [facility] 60  [place], [address] [facility]  Tel.  [phone]  Fax. [phone] [address] [facility] Kacie Campos   Tel.  [phone]  Fax. [phone]     Sleep Study Technical Notes        PRE-Test:  Pebbles Blandon (: 1945) arrived in the lobby. Patient was greeted, temperature checked (98.2 ) and screening questions asked. The patient was taken to the Sleep Center and taken directly to his/her room. BP (108/63) and SaO2 (95) were taken Procedure explained to the patient and questions were answered. The patient expressed understanding of the procedure. Electrodes were applied without incident. The patient was placed in bed and the study was started.  PAP mask acclimation for 0 min. Patient did/ tolerate mask. Acquisition Notes:   Lights off: 10:15 p     Respiratory events: Hypopneas   ECG:  irregular   Snoring:  Yes,  Mild/moderate   PAP titration: CPAP   Desensitization Mask(s) Used: F&P Eson 2 medium, ResMed AirTouch F20 medium   Other comments:   o Patient had caregiver in attendance:  No  o Patient watched TV or on phone after lights out for 1+ hour  o Patient slept with positional therapy:  No  Patient slept with head of bed elevated:  No  o Patient wore an oral appliance:  No  o Patient to bathroom 3 times    POST Test:   Patient was awakened. Electrodes were removed. The patient was discharged after answering the Post Questionnaire. Patient stated that she was alert and ok to drive.  Equipment and room cleaned per infection control policy.

## 2021-10-07 ENCOUNTER — TELEPHONE (OUTPATIENT)
Dept: SLEEP MEDICINE | Age: 76
End: 2021-10-07

## 2021-10-07 ENCOUNTER — DOCUMENTATION ONLY (OUTPATIENT)
Dept: SLEEP MEDICINE | Age: 76
End: 2021-10-07

## 2021-10-07 DIAGNOSIS — G47.33 OBSTRUCTIVE SLEEP APNEA (ADULT) (PEDIATRIC): Primary | ICD-10-CM

## 2021-10-07 NOTE — TELEPHONE ENCOUNTER
Belinda Lyle is to be contacted by lead sleep technologist regarding results of Sleep Testing which was indicative of sleep fragmentation on CPAP therapy indicative of CPAP Failure. * A second polysomnogram has been ordered for mask fitting, PAP desensitizing protocol, and Bi-Level pressure titration. Encounter Diagnosis   Name Primary?  Obstructive sleep apnea (adult) (pediatric) Yes       Orders Placed This Encounter    SLEEP LAB (PAP TITRATION)     Standing Status:   Future     Standing Expiration Date:   4/7/2022     Scheduling Instructions:      Perform Bi-level Titration.      Order Specific Question:   Reason for Exam     Answer:   TREVIN

## 2021-10-14 NOTE — PROGRESS NOTES
Note   Chief Complaint   Vaginal itching    Nabila Grade is a 68 y.o. female     1. Bacterial vaginosis  -     metroNIDAZOLE (FLAGYL) 500 mg tablet; Take 1 Tablet by mouth two (2) times a day for 7 days. , Normal, Disp-14 Tablet, R-0  -     NUSWAB VAGINITIS + HSV; Future  Patient reports noting 5-day history of vaginal itching especially around labia minora. Denies any discharge, odor, fever, chills. Clear discharge noted on exam.  Recommend empiric treatment for bacterial vaginosis. We will get a swab. If no significant improvement, possibly related to vaginal atrophy. Denies a history of breast cancer    2. Allergic rhinitis, unspecified seasonality, unspecified trigger  Patient reports noting persistent sinus congestion and drainage that has been ongoing for the past few weeks. Does have seasonal allergies. Denies any sinus pain or pressure. Does have a history of a sinus polyp. Reports using Afrin for the past week with minimal symptoms. Recommend stopping Afrin due to rebound symptoms. Recommend nasal steroid spray over-the-counter. If no improvement, consider oral antihistamine. Benefits, risks, possible drug interactions, and side effects of all new medications were reviewed with the patient. Pt verbalized understanding. Return to clinic:   As scheduled    An electronic signature was used to authenticate this note.   Mindy Sorenson MD  [facility]  10/15/2021    Future Appointments   Date Time Provider Olesya Dunbar   12/14/2021 10:00 AM Katina Krishnamurthy MD [facility] BS AMB   1/7/2022 10:00 AM SPT CT 1 SPTRCT SPT   8/16/2022  1:00 PM MD JOHN Tam BS AMB        Objective   Vitals:       Visit Vitals  /76 (BP 1 Location: Left upper arm, BP Patient Position: Sitting, BP Cuff Size: Adult)   Pulse 63   Temp 97.3 °F (36.3 °C) (Temporal)   Resp 14   Ht 5' 2\" (1.575 m)   Wt 182 lb (82.6 kg)   SpO2 96%   BMI 33.29 kg/m² Physical Exam  Constitutional:       Appearance: Normal appearance. She is not ill-appearing. Cardiovascular:      Rate and Rhythm: Normal rate. Pulmonary:      Effort: No respiratory distress. Genitourinary:     Comments: Nonspeculum exam noting clear discharge at introitus. Vaginal atrophy noted  Neurological:      Mental Status: She is alert. Current Outpatient Medications   Medication Sig    metroNIDAZOLE (FLAGYL) 500 mg tablet Take 1 Tablet by mouth two (2) times a day for 7 days.  sertraline (ZOLOFT) 100 mg tablet TAKE 1 TABLET BY MOUTH EVERY DAY IN THE EVENING    levothyroxine (SYNTHROID) 50 mcg tablet TAKE 1 TABLET BY MOUTH EVERY DAY    dapagliflozin (Farxiga) 5 mg tab tablet Take 5 mg by mouth daily.  dulaglutide (Trulicity) 3 EL/9.5 mL pnij by SubCUTAneous route every seven (7) days.  losartan-hydroCHLOROthiazide (HYZAAR) 50-12.5 mg per tablet TAKE 1 TABLET BY MOUTH TWICE A DAY    ondansetron (ZOFRAN ODT) 4 mg disintegrating tablet TAKE 1 TAB BY MOUTH EVERY EIGHT (8) HOURS AS NEEDED FOR NAUSEA OR VOMITING.  rosuvastatin (CRESTOR) 20 mg tablet TAKE 1 TABLET BY MOUTH EVERYDAY AT BEDTIME    metoprolol tartrate (LOPRESSOR) 25 mg tablet TAKE 1 TABLET BY MOUTH TWICE A DAY    cyanocobalamin 1,000 mcg tablet Take 1,000 mcg by mouth daily.  magnesium oxide (MAG-OX) 400 mg tablet TAKE 1 TAB BY MOUTH TWO (2) TIMES A DAY.  metFORMIN ER (GLUCOPHAGE XR) 500 mg tablet Take 1,000 mg by mouth two (2) times a day.  insulin glargine (LANTUS) 100 unit/mL injection 20 Units by SubCUTAneous route two (2) times a day. (Patient taking differently: by SubCUTAneous route three (3) times daily (with meals). Sliding scale)    HUMALOG KWIKPEN 100 unit/mL kwikpen 28 Units by SubCUTAneous route nightly. Sliding scale    Cholecalciferol, Vitamin D3, (VITAMIN D3) 1,000 unit cap Take 1 Cap by mouth nightly.  Aspirin, Buffered 81 mg tab Take  by mouth daily.     omega-3 acid ethyl esters (LOVAZA) 1 gram capsule Take 4 g by mouth daily.  loperamide (IMMODIUM) 2 mg tablet Take 2 tabs by mouth after the first loose stool of the day then take 1 tab after each bowel movement. Do not exceed 8 tablets total per day. (Patient not taking: Reported on 48/78/3784)    Trulicity 1.5 CY/7.4 mL sub-q pen 1.5 mg by SubCUTAneous route.  ACCU-CHEK COMPACT PLUS TEST strp USE TO TEST BLOOD SUGAR 4 TIMES DAILY. DX E11.9    Blood Sugar Diagnostic, Drum (ACCU-CHEK COMPACT PLUS TEST) strp USE TO TEST BLOOD SUGAR 4 TIMES DAILY. DX E11.9     No current facility-administered medications for this visit.

## 2021-10-15 ENCOUNTER — OFFICE VISIT (OUTPATIENT)
Dept: INTERNAL MEDICINE CLINIC | Age: 76
End: 2021-10-15
Payer: MEDICARE

## 2021-10-15 VITALS
HEIGHT: 62 IN | RESPIRATION RATE: 14 BRPM | OXYGEN SATURATION: 96 % | SYSTOLIC BLOOD PRESSURE: 121 MMHG | HEART RATE: 63 BPM | DIASTOLIC BLOOD PRESSURE: 76 MMHG | WEIGHT: 182 LBS | TEMPERATURE: 97.3 F | BODY MASS INDEX: 33.49 KG/M2

## 2021-10-15 DIAGNOSIS — N76.0 BACTERIAL VAGINOSIS: Primary | ICD-10-CM

## 2021-10-15 DIAGNOSIS — B96.89 BACTERIAL VAGINOSIS: Primary | ICD-10-CM

## 2021-10-15 DIAGNOSIS — J30.9 ALLERGIC RHINITIS, UNSPECIFIED SEASONALITY, UNSPECIFIED TRIGGER: ICD-10-CM

## 2021-10-15 PROCEDURE — G8400 PT W/DXA NO RESULTS DOC: HCPCS | Performed by: INTERNAL MEDICINE

## 2021-10-15 PROCEDURE — G8427 DOCREV CUR MEDS BY ELIG CLIN: HCPCS | Performed by: INTERNAL MEDICINE

## 2021-10-15 PROCEDURE — 1090F PRES/ABSN URINE INCON ASSESS: CPT | Performed by: INTERNAL MEDICINE

## 2021-10-15 PROCEDURE — G8752 SYS BP LESS 140: HCPCS | Performed by: INTERNAL MEDICINE

## 2021-10-15 PROCEDURE — G8536 NO DOC ELDER MAL SCRN: HCPCS | Performed by: INTERNAL MEDICINE

## 2021-10-15 PROCEDURE — G8510 SCR DEP NEG, NO PLAN REQD: HCPCS | Performed by: INTERNAL MEDICINE

## 2021-10-15 PROCEDURE — G0463 HOSPITAL OUTPT CLINIC VISIT: HCPCS | Performed by: INTERNAL MEDICINE

## 2021-10-15 PROCEDURE — 99213 OFFICE O/P EST LOW 20 MIN: CPT | Performed by: INTERNAL MEDICINE

## 2021-10-15 PROCEDURE — 1101F PT FALLS ASSESS-DOCD LE1/YR: CPT | Performed by: INTERNAL MEDICINE

## 2021-10-15 PROCEDURE — G8754 DIAS BP LESS 90: HCPCS | Performed by: INTERNAL MEDICINE

## 2021-10-15 PROCEDURE — G8417 CALC BMI ABV UP PARAM F/U: HCPCS | Performed by: INTERNAL MEDICINE

## 2021-10-15 RX ORDER — METRONIDAZOLE 500 MG/1
500 TABLET ORAL 2 TIMES DAILY
Qty: 14 TABLET | Refills: 0 | Status: SHIPPED | OUTPATIENT
Start: 2021-10-15 | End: 2021-10-22

## 2021-10-15 NOTE — PATIENT INSTRUCTIONS
For your sinuses:  Start nasal spray like flonase, nasonex, nasacort   If doesn't completely resolve, then try allergy medications like claritin, allegra, or zyrtec

## 2021-10-17 DIAGNOSIS — I10 ESSENTIAL HYPERTENSION: ICD-10-CM

## 2021-10-18 RX ORDER — METOPROLOL TARTRATE 25 MG/1
TABLET, FILM COATED ORAL
Qty: 180 TABLET | Refills: 3 | Status: SHIPPED | OUTPATIENT
Start: 2021-10-18 | End: 2022-09-26

## 2021-10-18 NOTE — TELEPHONE ENCOUNTER
[facility] message sent with results. Please schedule covid 19 testing and Bilevel titration study.     Thanks

## 2021-10-19 ENCOUNTER — TELEPHONE (OUTPATIENT)
Dept: SLEEP MEDICINE | Age: 76
End: 2021-10-19

## 2021-10-19 NOTE — TELEPHONE ENCOUNTER
Patient and her  had questions concerning CPAP titration results. Results explained, however, patient's  felt that the patient's allergies contributed to issues with the titration and fragmented sleep. Advised patient that she could have an office visit with Dr. Jose Neal to discuss results and treatment options. Transferred to the front to schedule appointment.

## 2021-10-21 ENCOUNTER — VIRTUAL VISIT (OUTPATIENT)
Dept: SLEEP MEDICINE | Age: 76
End: 2021-10-21
Payer: MEDICARE

## 2021-10-21 DIAGNOSIS — E11.21 TYPE 2 DIABETES WITH NEPHROPATHY (HCC): ICD-10-CM

## 2021-10-21 DIAGNOSIS — G47.33 OBSTRUCTIVE SLEEP APNEA (ADULT) (PEDIATRIC): Primary | ICD-10-CM

## 2021-10-21 DIAGNOSIS — Z11.52 ENCOUNTER FOR SCREENING FOR COVID-19: ICD-10-CM

## 2021-10-21 DIAGNOSIS — I10 ESSENTIAL HYPERTENSION WITH GOAL BLOOD PRESSURE LESS THAN 130/80: ICD-10-CM

## 2021-10-21 LAB
A VAGINAE DNA VAG QL NAA+PROBE: NORMAL SCORE
BVAB2 DNA VAG QL NAA+PROBE: NORMAL SCORE
C ALBICANS DNA VAG QL NAA+PROBE: NEGATIVE
C GLABRATA DNA VAG QL NAA+PROBE: NEGATIVE
C TRACH RRNA SPEC QL NAA+PROBE: NEGATIVE
HSV1 DNA SPEC QL NAA+PROBE: NEGATIVE
HSV2 DNA SPEC QL NAA+PROBE: NEGATIVE
MEGA1 DNA VAG QL NAA+PROBE: NORMAL SCORE
N GONORRHOEA RRNA SPEC QL NAA+PROBE: NEGATIVE
SPECIMEN SOURCE: NORMAL
T VAGINALIS RRNA SPEC QL NAA+PROBE: NEGATIVE

## 2021-10-21 PROCEDURE — 99215 OFFICE O/P EST HI 40 MIN: CPT | Performed by: INTERNAL MEDICINE

## 2021-10-21 PROCEDURE — G8756 NO BP MEASURE DOC: HCPCS | Performed by: INTERNAL MEDICINE

## 2021-10-21 PROCEDURE — G8400 PT W/DXA NO RESULTS DOC: HCPCS | Performed by: INTERNAL MEDICINE

## 2021-10-21 PROCEDURE — 1090F PRES/ABSN URINE INCON ASSESS: CPT | Performed by: INTERNAL MEDICINE

## 2021-10-21 PROCEDURE — G8432 DEP SCR NOT DOC, RNG: HCPCS | Performed by: INTERNAL MEDICINE

## 2021-10-21 PROCEDURE — G8417 CALC BMI ABV UP PARAM F/U: HCPCS | Performed by: INTERNAL MEDICINE

## 2021-10-21 PROCEDURE — 3052F HG A1C>EQUAL 8.0%<EQUAL 9.0%: CPT | Performed by: INTERNAL MEDICINE

## 2021-10-21 PROCEDURE — 1101F PT FALLS ASSESS-DOCD LE1/YR: CPT | Performed by: INTERNAL MEDICINE

## 2021-10-21 PROCEDURE — G8427 DOCREV CUR MEDS BY ELIG CLIN: HCPCS | Performed by: INTERNAL MEDICINE

## 2021-10-21 PROCEDURE — G8536 NO DOC ELDER MAL SCRN: HCPCS | Performed by: INTERNAL MEDICINE

## 2021-10-21 NOTE — PROGRESS NOTES
217 [facility]. [place], [address]  Tel.  [phone]  Fax. 100 [facility] 60  1001 [facility] [place], [address]  Tel.  [phone]  Fax. [phone] 9250 [facility] Kacie Campos   Tel.  [phone]  Fax. Nela Epps (: 1945) is a 68 y.o. female, established patient, seen for positive airway pressure follow-up and evaluation of the following chief complaint(s):   Sleep Problem (Sleep Disorder Management)       Cassidy Poe was last seen by Ms. Vanessa Roy on 21, prior notes reviewed in detail. Home sleep test 3/2016 showed AHI of 18.6/hr with a lowest SaO2 of 71%, duration of SaO2 < 88% 125 min. She was COVID + in 2020. Polysomnogram was performed again on 21 due to interim weight loss and indicated an average AHI of 24.4 per hour with an SpO2 johanna of 81%, the duration of SpO2 <88% was 8.1 minutes. She returned for a CPAP trial on 10-04-21, Sleep Testing was indicative of sleep fragmentation on CPAP therapy indicative of CPAP Failure. SpO2 johanna of 82%, the duration of SpO2 <88% was 21 minutes. ASSESSMENT/PLAN:    ICD-10-CM ICD-9-CM    1. Obstructive sleep apnea (adult) (pediatric)  G47.33 327.23 SLEEP LAB (PAP TITRATION)   2. Essential hypertension with goal blood pressure less than 130/80  I10 401.9    3. Type 2 diabetes with nephropathy (HCC)  E11.21 250.40      583.81    4. Adult BMI 33.0-33.9 kg/sq m  Z68.33 V85.33    5. Encounter for screening for COVID-19  Z11.52 V73.89 NOVEL CORONAVIRUS (COVID-19)      NOVEL CORONAVIRUS (COVID-19)       1. Sleep Apnea -   * She was made aware of the results of her sleep tests, differences between CPAP and Bi-Level therapy discussed. She would like to return for a trial of Bi-Level therapy.     Orders Placed This Encounter    NOVEL CORONAVIRUS (COVID-19)     Standing Status:   Future     Number of Occurrences:   1     Standing Expiration Date:   2022 Scheduling Instructions:      1) Due to current limited availability of the COVID-19 PCR test, tests will be prioritized and may not be completed.              2) Order only if the test result will change clinical management or necessary for a return to mission-critical employment decision.              3) Print and instruct patient to adhere to [facility] home isolation program. (Link Above)              4) Set up or refer patient for a monitoring program.              5) Have patient sign up for and leverage MyChart (if not previously done). Order Specific Question:   Status     Answer:   Asymptomatic/Surveillance(e.g. pre-op/pre-procedure/pre-delivery/transfer)     Order Specific Question:   Reason for Test     Answer:   Upcoming elective surgery/procedure/delivery, return to work, or discharge to another facility    SLEEP LAB (PAP TITRATION)     Standing Status:   Future     Standing Expiration Date:   4/21/2022     Scheduling Instructions:      Perform Bi-level Titration. Order Specific Question:   Reason for Exam     Answer:   TREVIN         * Counseling was provided regarding the importance of regular PAP use with emphasis on ensuring sufficient total sleep time, proper sleep hygiene, and safe driving. * Re-enforced proper and regular cleaning for the device. * She was asked to contact our office for any problems regarding PAP therapy. 2. Hypertension-  * Continue on current regimen, she will continue to monitor her BP and follow up with her primary care provider for reevaluation/adjustment of medications if warranted. 3. Type II diabetes -    * She continues on her current regimen. 4. BMI 33-  * Recommended a dedicated weight loss program through appropriate diet and exercise regimen as significant weight reduction has been shown to reduce severity of obstructive sleep apnea. 5. COVID Testing-  * She has been vaccinated but agrees to come in for COVID testing.     Follow-up and Dispositions    · Return for telephone follow-up after testing is completed. SUBJECTIVE/OBJECTIVE:    She  is seen today for follow up to review her sleep test resultsand reports she has not been using the device recently, since recovering from [place]. She notes that she feels she is sleeping well. Her bed partner reports she is still snoring. Sleep testing was ordered due to interim weigh loss, she was noted to have persistent TREVIN and return for a CPAP titration. She was failed a trial of CPAP and a Bi-Level trial was ordered, patient expressed an interest in a return visit to discuss test results and treatment options. Vitals reported by patient     Patient-Reported Vitals 10/21/2021   Patient-Reported Weight 182 lbs   Patient-Reported Height -   Patient-Reported Pulse 63   Patient-Reported Temperature -   Patient-Reported SpO2 96%   Patient-Reported Systolic  975   Patient-Reported Diastolic 76      Calculated BMI 33.29 kg/m2    Physical Exam completed by visual and auditory observation of patient with verbal input from patient. General:   Alert, oriented, not in acute distress   Eyes:  Anicteric Sclerae; no obvious strabismus   Nose:  No obvious nasal septum deviation    Neck:   Midline trachea, no visible mass   Chest/Lungs:  Respiratory effort normal, no visualized signs of difficulty breathing or respiratory distress   CVS:  No JVD   Extremities:  No obvious rashes noted on face, neck, or hands   Neuro:  No facial asymmetry, no focal deficits; no obvious tremor    Psych:  Normal affect,  normal countenance     Yeyo Woo is being evaluated by a Virtual Visit (video visit) encounter to address concerns as mentioned above. A caregiver was present when appropriate. Due to this being a TeleHealth encounter (During VQFMG-01 public health emergency), evaluation of the following organ systems was limited: Vitals/Constitutional/EENT/Resp/CV/GI//MS/Neuro/Skin/Heme-Lymph-Imm.   Pursuant to the emergency declaration under the [facility], [address] [place] [address] authority and the [employer] Act, this Virtual Visit was conducted with patient's (and/or legal guardian's) consent, to reduce the patient's risk of exposure to COVID-19 and provide necessary medical care. Patient identification was verified at the start of the visit: YES using name and date of birth. Patient's phone number [phone] (home)  was confirmed for accuracy. She gives permission for messages regarding results and appointments to be left at that number. Services were provided through a video synchronous discussion virtually to substitute for in-person clinic visit. I was in the office while conducting this encounter, patient located at their home or alternate location of their choice. On this date 10/21/2021 I have spent 45 minutes reviewing previous notes, test results and face to face with the patient discussing the diagnosis and importance of compliance with the treatment plan as well as documenting on the day of the visit. An electronic signature was used to authenticate this note. Sarwat Linda MD, FAASM  Electronically signed.  10/21/21

## 2021-10-21 NOTE — PATIENT INSTRUCTIONS
[facility] [facility] [place], [address]  Tel.  [phone]  Fax. [address] [facility] [address]  [place], [address] [facility]  Tel.  [phone]  Fax. [phone] 9250 Kacie Horvath  Tel.  [phone]  Fax. [phone]     Learning About CPAP for Sleep Apnea  What is CPAP? CPAP is a small machine that you use at home every night while you sleep. It increases air pressure in your throat to keep your airway open. When you have sleep apnea, this can help you sleep better so you feel much better. CPAP stands for \"continuous positive airway pressure. \"  The CPAP machine will have one of the following:  · A mask that covers your nose and mouth  · Prongs that fit into your nose  · A mask that covers your nose only, the most common type. This type is called NCPAP. The N stands for \"nasal.\"  Why is it done? CPAP is usually the best treatment for obstructive sleep apnea. It is the first treatment choice and the most widely used. Your doctor may suggest CPAP if you have:  · Moderate to severe sleep apnea. · Sleep apnea and coronary artery disease (CAD) or heart failure. How does it help? · CPAP can help you have more normal sleep, so you feel less sleepy and more alert during the daytime. · CPAP may help keep heart failure or other heart problems from getting worse. · NCPAP may help lower your blood pressure. · If you use CPAP, your bed partner may also sleep better because you are not snoring or restless. What are the side effects? Some people who use CPAP have:  · A dry or stuffy nose and a sore throat. · Irritated skin on the face. · Sore eyes. · Bloating. If you have any of these problems, work with your doctor to fix them. Here are some things you can try:  · Be sure the mask or nasal prongs fit well. · See if your doctor can adjust the pressure of your CPAP. · If your nose is dry, try a humidifier.   · If your nose is runny or stuffy, try decongestant medicine or a steroid nasal spray. If these things do not help, you might try a different type of machine. Some machines have air pressure that adjusts on its own. Others have air pressures that are different when you breathe in than when you breathe out. This may reduce discomfort caused by too much pressure in your nose. Where can you learn more? Go to [employer]  Enter Alhaji Contreras in the search box to learn more about \"Learning About CPAP for Sleep Apnea. \"   © 6520-6824 Healthwise, Incorporated. Care instructions adapted under license by [facility] (which disclaims liability or warranty for this information). This care instruction is for use with your licensed healthcare professional. If you have questions about a medical condition or this instruction, always ask your healthcare professional. [address] any warranty or liability for your use of this information. Content Version: 4.8.91086; Last Revised: January 11, 2010  PROPER SLEEP HYGIENE    What to avoid  · Do not have drinks with caffeine, such as coffee or black tea, for 8 hours before bed. · Do not smoke or use other types of tobacco near bedtime. Nicotine is a stimulant and can keep you awake. · Avoid drinking alcohol late in the evening, because it can cause you to wake in the middle of the night. · Do not eat a big meal close to bedtime. If you are hungry, eat a light snack. · Do not drink a lot of water close to bedtime, because the need to urinate may wake you up during the night. · Do not read or watch TV in bed. Use the bed only for sleeping and sexual activity. What to try  · Go to bed at the same time every night, and wake up at the same time every morning. Do not take naps during the day. · Keep your bedroom quiet, dark, and cool. · Get regular exercise, but not within 3 to 4 hours of your bedtime. .  · Sleep on a comfortable pillow and mattress.   · If watching the clock makes you anxious, turn it facing away from you so you cannot see the time. · If you worry when you lie down, start a worry book. Well before bedtime, write down your worries, and then set the book and your concerns aside. · Try meditation or other relaxation techniques before you go to bed. · If you cannot fall asleep, get up and go to another room until you feel sleepy. Do something relaxing. Repeat your bedtime routine before you go to bed again. · Make your house quiet and calm about an hour before bedtime. Turn down the lights, turn off the TV, log off the computer, and turn down the volume on music. This can help you relax after a busy day. Drowsy Driving: The [facility] 54 cites drowsiness as a causing factor in more than 908,438 police reported crashes annually, resulting in 76,000 injuries and 1,500 deaths. Other surveys suggest 55% of people polled have driven while drowsy in the past year, 23% had fallen asleep but not crashed, 3% crashed, and 2% had and accident due to drowsy driving. Who is at risk? Young Drivers: One study of drowsy driving accidents states that 55% of the drivers were under 25 years. Of those, 75% were male. Shift Workers and Travelers: People who work overnight or travel across time zones frequently are at higher risk of experiencing Circadian Rhythm Disorders. They are trying to work and function when their body is programed to sleep. Sleep Deprived: Lack of sleep has a serious impact on your ability to pay attention or focus on a task. Consistently getting less than the average of 8 hours your body needs creates partial or cumulative sleep deprivation. Untreated Sleep Disorders: Sleep Apnea, Narcolepsy, R.L.S., and other sleep disorders (untreated) prevent a person from getting enough restful sleep. This leads to excessive daytime sleepiness and increases the risk for drowsy driving accidents by up to 7 times.   Medications / Alcohol: Even over the counter medications can cause drowsiness. Medications that impair a drivers attention should have a warning label. Alcohol naturally makes you sleepy and on its own can cause accidents. Combined with excessive drowsiness its effects are amplified. Signs of Drowsy Driving:   * You don't remember driving the last few miles   * You may drift out of your jerry   * You are unable to focus and your thoughts wander   * You may yawn more often than normal   * You have difficulty keeping your eyes open / nodding off   * Missing traffic signs, speeding, or tailgating  Prevention-   Good sleep hygiene, lifestyle and behavioral choices have the most impact on drowsy driving. There is no substitute for sleep and the average person requires 8 hours nightly. If you find yourself driving drowsy, stop and sleep. Consider the sleep hygiene tips provided during your visit as well. Medication Refill Policy: Refills for all medications require 1 week advance notice. Please have your pharmacy fax a refill request. We are unable to fax, or call in \"controled substance\" medications and you will need to pick these prescriptions up from our office. [employer] Activation    Thank you for requesting access to [employer]. Please follow the instructions below to securely access and download your online medical record. [employer] allows you to send messages to your doctor, view your test results, renew your prescriptions, schedule appointments, and more. How Do I Sign Up? 1. In your internet browser, go to [email or web address] 2. Click on the First Time User? Click Here link in the Sign In box. You will see the New Member Sign Up page. 3. Enter your [employer] Access Code exactly as it appears below. You will not need to use this code after youve completed the sign-up process. If you do not sign up before the expiration date, you must request a new code. [employer] Access Code:  Activation code not generated  Current [facility] Status: Active (This is the date your [facility] access code will )    4. Enter the last four digits of your Social Security Number (xxxx) and Date of Birth (mm/dd/yyyy) as indicated and click Submit. You will be taken to the next sign-up page. 5. Create a [employer] ID. This will be your [facility] login ID and cannot be changed, so think of one that is secure and easy to remember. 6. Create a [facility] password. You can change your password at any time. 7. Enter your Password Reset Question and Answer. This can be used at a later time if you forget your password. 8. Enter your e-mail address. You will receive e-mail notification when new information is available in [address] 9. Click Sign Up. You can now view and download portions of your medical record. 10. Click the Download Summary menu link to download a portable copy of your medical information. Additional Information    If you have questions, please call [phone]. Remember, [facility] is NOT to be used for urgent needs. For medical emergencies, dial 911.

## 2021-12-01 ENCOUNTER — HOSPITAL ENCOUNTER (OUTPATIENT)
Dept: PREADMISSION TESTING | Age: 76
Discharge: HOME | End: 2021-12-01
Attending: INTERNAL MEDICINE
Payer: MEDICARE

## 2021-12-01 ENCOUNTER — TRANSCRIBE ORDER (OUTPATIENT)
Dept: REGISTRATION | Age: 76
End: 2021-12-01

## 2021-12-01 DIAGNOSIS — Z01.812 PRE-PROCEDURE LAB EXAM: ICD-10-CM

## 2021-12-01 DIAGNOSIS — Z01.812 PRE-PROCEDURE LAB EXAM: Primary | ICD-10-CM

## 2021-12-01 PROCEDURE — U0005 INFEC AGEN DETEC AMPLI PROBE: HCPCS

## 2021-12-02 LAB
SARS-COV-2, XPLCVT: NOT DETECTED
SOURCE, COVRS: NORMAL

## 2021-12-04 DIAGNOSIS — U07.1 COVID-19: ICD-10-CM

## 2021-12-04 RX ORDER — ONDANSETRON 4 MG/1
4 TABLET, ORALLY DISINTEGRATING ORAL
Qty: 20 TABLET | Refills: 1 | Status: SHIPPED | OUTPATIENT
Start: 2021-12-04

## 2021-12-04 RX ORDER — SERTRALINE HYDROCHLORIDE 100 MG/1
TABLET, FILM COATED ORAL
Qty: 90 TABLET | Refills: 0 | Status: SHIPPED | OUTPATIENT
Start: 2021-12-04 | End: 2021-12-21

## 2021-12-05 ENCOUNTER — HOSPITAL ENCOUNTER (OUTPATIENT)
Dept: SLEEP MEDICINE | Age: 76
Discharge: HOME | End: 2021-12-05
Payer: MEDICARE

## 2021-12-05 VITALS
HEART RATE: 66 BPM | SYSTOLIC BLOOD PRESSURE: 103 MMHG | DIASTOLIC BLOOD PRESSURE: 52 MMHG | TEMPERATURE: 97.8 F | OXYGEN SATURATION: 96 %

## 2021-12-05 DIAGNOSIS — G47.33 OBSTRUCTIVE SLEEP APNEA (ADULT) (PEDIATRIC): ICD-10-CM

## 2021-12-05 PROCEDURE — 95811 POLYSOM 6/>YRS CPAP 4/> PARM: CPT | Performed by: INTERNAL MEDICINE

## 2021-12-06 NOTE — PROGRESS NOTES
217 [facility]. [place], [address]  Tel.  [phone]  Fax. 100 [facility] 60  [place], [address] [facility]  Tel.  [phone]  Fax. [phone] 9250 [facility] Kacie Campos  Tel.  [phone]  Fax. [phone]     Sleep Study Technical Notes        PRE-Test:  Lacey Donaldson (: 1945) arrived in the lobby. Patient was greeted, temperature checked (97.8 ) and screening questions asked. The patient was taken to the Sleep Center and taken directly to his/her room. BP (103/52) and SaO2 (96) were taken. Weight per patient (193). Procedure explained to the patient and questions were answered. The patient expressed understanding of the procedure. Electrodes were applied without incident. The patient was placed in bed and the study was started.  PAP mask acclimation for 5 min. Patient  did tolerate mask. Acquisition Notes:  Jerome Romero off: 2307     Respiratory events: obstructive   ECG:  irregular   Snoring:  mild   PAP titration: Bilevel   Desensitization Mask(s) Used: F [employer] P Viterra Medium   Other comments:    o Patient had caregiver in attendance:  N  o Patient watched TV or on phone after lights out for 0 hours  o Patient slept with positional therapy:  N  o Patient slept with head of bed elevated:  20 degrees  o Patient wore an oral appliance:  N  o Patient to bathroom 1 times     POST Test:   Patient was awakened. Electrodes were removed. The patient was discharged after answering the Post Questionnaire. Patient stated that she was alert and ok to drive.  Equipment and room cleaned per infection control policy.

## 2021-12-14 ENCOUNTER — OFFICE VISIT (OUTPATIENT)
Dept: INTERNAL MEDICINE CLINIC | Age: 76
End: 2021-12-14
Payer: MEDICARE

## 2021-12-14 VITALS
DIASTOLIC BLOOD PRESSURE: 83 MMHG | HEIGHT: 62 IN | BODY MASS INDEX: 33.86 KG/M2 | OXYGEN SATURATION: 96 % | WEIGHT: 184 LBS | SYSTOLIC BLOOD PRESSURE: 142 MMHG | HEART RATE: 59 BPM | TEMPERATURE: 97.6 F | RESPIRATION RATE: 14 BRPM

## 2021-12-14 DIAGNOSIS — E11.9 TYPE 2 DIABETES MELLITUS WITHOUT COMPLICATION (HCC): ICD-10-CM

## 2021-12-14 DIAGNOSIS — G47.30 SLEEP APNEA, UNSPECIFIED TYPE: ICD-10-CM

## 2021-12-14 DIAGNOSIS — R41.3 MEMORY CHANGES: ICD-10-CM

## 2021-12-14 DIAGNOSIS — I10 ESSENTIAL HYPERTENSION: Primary | ICD-10-CM

## 2021-12-14 PROCEDURE — G8754 DIAS BP LESS 90: HCPCS | Performed by: INTERNAL MEDICINE

## 2021-12-14 PROCEDURE — G8417 CALC BMI ABV UP PARAM F/U: HCPCS | Performed by: INTERNAL MEDICINE

## 2021-12-14 PROCEDURE — G8510 SCR DEP NEG, NO PLAN REQD: HCPCS | Performed by: INTERNAL MEDICINE

## 2021-12-14 PROCEDURE — G8753 SYS BP > OR = 140: HCPCS | Performed by: INTERNAL MEDICINE

## 2021-12-14 PROCEDURE — 1101F PT FALLS ASSESS-DOCD LE1/YR: CPT | Performed by: INTERNAL MEDICINE

## 2021-12-14 PROCEDURE — 99214 OFFICE O/P EST MOD 30 MIN: CPT | Performed by: INTERNAL MEDICINE

## 2021-12-14 PROCEDURE — G0463 HOSPITAL OUTPT CLINIC VISIT: HCPCS | Performed by: INTERNAL MEDICINE

## 2021-12-14 PROCEDURE — G8400 PT W/DXA NO RESULTS DOC: HCPCS | Performed by: INTERNAL MEDICINE

## 2021-12-14 PROCEDURE — 1090F PRES/ABSN URINE INCON ASSESS: CPT | Performed by: INTERNAL MEDICINE

## 2021-12-14 PROCEDURE — G8427 DOCREV CUR MEDS BY ELIG CLIN: HCPCS | Performed by: INTERNAL MEDICINE

## 2021-12-14 PROCEDURE — G8536 NO DOC ELDER MAL SCRN: HCPCS | Performed by: INTERNAL MEDICINE

## 2021-12-14 RX ORDER — DULAGLUTIDE 1.5 MG/.5ML
3 INJECTION, SOLUTION SUBCUTANEOUS
Qty: 3 EACH | Refills: 0
Start: 2021-12-14 | End: 2021-12-14 | Stop reason: ALTCHOICE

## 2021-12-14 NOTE — PROGRESS NOTES
Diagnoses and all orders for this visit:    1. Essential hypertension  Continue blood pressure medication  Intentional weight loss and encouraged heart healthy diet  Continue exercise as tolerated    2. Type 2 diabetes mellitus without complication (HCC)  Not optimally controlled  We will check labs  Follow-up with Dr. Adrianna Mars, COMPREHENSIVE; Future  -     MICROALBUMIN, UR, RAND W/ MICROALB/CREAT RATIO; Future    3. Memory changes  Discussed with patient and   Likely multifactorial: Metabolic/vascular, sleep apnea,  Encouraged positive behavioral changes that she is already made. She is doing well with her weight loss and improved blood pressure. Will work on diabetes      Sleep apnea  Follow-up with sleep clinic for settings      Follow-up with specialists: Endocrinology, cardiology  Follow-up in 6 months or earlier if needed      Chief Complaint   Patient presents with    Follow-up     Diabetes   She is being followed by Dr. Pascale Morton. She is wearing a free mendel. Her hemoglobin A1c is still at 8.5. She notes that she still struggles with carbohydrates. She has been seen by the dietitian and making some changes. She is no longer adding the bananas to her cereal.  She denies hypoglycemia. She is tolerating the Metformin. She has kept out it Trulicity 3 mg. Her  notes that she has some increased nausea after the 3 mg once a week. She sometimes has to take Zofran to help with her symptoms. Nighttime insulin      Subjective:   Nabila Lemus is a 68 y.o. female with hypertension. Hypertension ROS: taking medications as instructed, no medication side effects noted, no TIA's, no chest pain on exertion, no dyspnea on exertion, no swelling of ankles. New concerns: None. SUBJECTIVE: Nabila Lemus is a 68 y.o. female here for follow up of hypothyroidism.   Lab Results   Component Value Date/Time    TSH 1.94 01/05/2021 11:50 AM     Thyroid ROS: denies fatigue, weight changes, heat/cold intolerance, bowel/skin changes or CVS symptoms. Memory impairment   notes that patient's memory seems to wax and wane. Periods of lucid conversation but then oftentimes repeated conversations. Discussion with patient with regards to writing things down and also using her phone as a tool for memory        Past Medical History:   Diagnosis Date    Arthritis     CAD (coronary artery disease) 2006    STENT PLACED>PLAQUE BROKE OFF--HAD \"MINOR MI\"    Deviated septum 13    HAS TROUBLE BREATHING THROUGH LEFT SIDE; CT SHOWED POLYP    Diabetes ([address]     Heart disease     Hypertension     Incontinence     Nasal polyp     Unspecified sleep apnea 2013    cpap     Past Surgical History:   Procedure Laterality Date    HX  SECTION      X3    HX COLONOSCOPY      HX HEENT  ? SEPTOPLASTY    ID CARDIAC SURG PROCEDURE UNLIST      CARDIAC CATH;ANGIOPLASTY WITH STENT     Social History     Socioeconomic History    Marital status:    Tobacco Use    Smoking status: Former Smoker     Packs/day: 1.00     Years: 5.00     Pack years: 5.00     Quit date: 1970     Years since quittin.0    Smokeless tobacco: Never Used   Substance and Sexual Activity    Alcohol use: Not Currently    Drug use: No   Social History Narrative            3 children women 36, 40, 40 healthy other than skin issues     Family History   Problem Relation Age of Onset    Heart Disease Mother     Heart Surgery Mother         VALVE REPLACED AT AGE 72    Heart Attack Father 46        MASSIVE     Anesth Problems Neg Hx      Current Outpatient Medications   Medication Sig Dispense Refill    ondansetron (ZOFRAN ODT) 4 mg disintegrating tablet TAKE 1 TAB BY MOUTH EVERY EIGHT (8) HOURS AS NEEDED FOR NAUSEA OR VOMITING.  20 Tablet 1    sertraline (ZOLOFT) 100 mg tablet TAKE 1 TABLET BY MOUTH EVERY DAY IN THE EVENING 90 Tablet 0    losartan-hydroCHLOROthiazide (HYZAAR) 50-12.5 mg per tablet TAKE 1 TABLET BY MOUTH TWICE A  Tablet 0    metoprolol tartrate (LOPRESSOR) 25 mg tablet TAKE 1 TABLET BY MOUTH TWICE A  Tablet 3    levothyroxine (SYNTHROID) 50 mcg tablet TAKE 1 TABLET BY MOUTH EVERY DAY 90 Tablet 2    dapagliflozin (Farxiga) 5 mg tab tablet Take 5 mg by mouth daily.  dulaglutide (Trulicity) 3 PC/9.5 mL pnij by SubCUTAneous route every seven (7) days.  rosuvastatin (CRESTOR) 20 mg tablet TAKE 1 TABLET BY MOUTH EVERYDAY AT BEDTIME 90 Tablet 1    loperamide (IMMODIUM) 2 mg tablet Take 2 tabs by mouth after the first loose stool of the day then take 1 tab after each bowel movement. Do not exceed 8 tablets total per day. 30 Tab 1    Trulicity 1.5 ALSTON/6.6 mL sub-q pen 1.5 mg by SubCUTAneous route.  cyanocobalamin 1,000 mcg tablet Take 1,000 mcg by mouth daily.  magnesium oxide (MAG-OX) 400 mg tablet TAKE 1 TAB BY MOUTH TWO (2) TIMES A DAY. 240 Tab 1    metFORMIN ER (GLUCOPHAGE XR) 500 mg tablet Take 1,000 mg by mouth two (2) times a day. 3    HUMALOG KWIKPEN 100 unit/mL kwikpen 28 Units by SubCUTAneous route nightly. Sliding scale  3    Cholecalciferol, Vitamin D3, (VITAMIN D3) 1,000 unit cap Take 1 Cap by mouth nightly. 30 Cap 3    Aspirin, Buffered 81 mg tab Take  by mouth daily.  omega-3 acid ethyl esters (LOVAZA) 1 gram capsule Take 4 g by mouth daily.  insulin glargine (LANTUS) 100 unit/mL injection 20 Units by SubCUTAneous route two (2) times a day. (Patient taking differently: by SubCUTAneous route three (3) times daily (with meals).  Sliding scale) 1 Vial 3     Allergies   Allergen Reactions    Adhesive Rash and Itching    Advil [Ibuprofen] Swelling     FEET SWELL    Ceftin [Cefuroxime Axetil] Itching    Lotensin [Benazepril] Cough    Penicillins Swelling    Sulfa (Sulfonamide Antibiotics) Rash, Itching and Swelling       Review of Systems - General ROS: negative for - chills or fever  Cardiovascular ROS: no chest pain or dyspnea on exertion  Respiratory ROS: no cough, shortness of breath, or wheezing    Visit Vitals  BP (!) 142/83 (BP 1 Location: Left upper arm, BP Patient Position: Sitting, BP Cuff Size: Adult)   Pulse (!) 59   Temp 97.6 °F (36.4 °C) (Oral)   Resp 14   Ht 5' 2\" (1.575 m)   Wt 184 lb (83.5 kg)   SpO2 96%   BMI 33.65 kg/m²     Constitutional: [x] Appears well-developed and well-nourished [x] No apparent distress      [] Abnormal -     Mental status: [x] Alert and awake  [x] Oriented to person/place/time [x] Able to follow commands    [] Abnormal -     Eyes:   EOM    [x]  Normal    [] Abnormal -   Sclera  [x]  Normal    [] Abnormal -          Discharge [x]  None visible   [] Abnormal -     HENT: [x] Normocephalic, atraumatic  [] Abnormal -   [x] Mouth/Throat: Mucous membranes are moist    External Ears [x] Normal  [] Abnormal -    Neck: [x] No visualized mass [] Abnormal -     Pulmonary/Chest: [x] Respiratory effort normal   [x] No visualized signs of difficulty breathing or respiratory distress        [] Abnormal -      Musculoskeletal:   [x] Normal gait with no signs of ataxia         [x] Normal range of motion of neck        [] Abnormal -     Neurological:        [x] No Facial Asymmetry (Cranial nerve 7 motor function) (limited exam due to video visit)          [x] No gaze palsy        [] Abnormal -          Skin:        [x] No significant exanthematous lesions or discoloration noted on facial skin         [] Abnormal -            Psychiatric:       [x] Normal Affect [] Abnormal -        [x] No Hallucinations      ATTENTION:   This medical record was transcribed using an electronic medical records/speech recognition system. Although proofread, it may and can contain electronic, spelling and other errors. Corrections may be executed at a later time. Please contact us for any clarifications as needed.

## 2021-12-15 LAB
ALBUMIN SERPL-MCNC: 4 G/DL (ref 3.5–5)
ALBUMIN/GLOB SERPL: 1.3 {RATIO} (ref 1.1–2.2)
ALP SERPL-CCNC: 100 U/L (ref 45–117)
ALT SERPL-CCNC: 42 U/L (ref 12–78)
ANION GAP SERPL CALC-SCNC: 8 MMOL/L (ref 5–15)
AST SERPL-CCNC: 23 U/L (ref 15–37)
BILIRUB SERPL-MCNC: 0.6 MG/DL (ref 0.2–1)
BUN SERPL-MCNC: 21 MG/DL (ref 6–20)
BUN/CREAT SERPL: 23 (ref 12–20)
CALCIUM SERPL-MCNC: 9.7 MG/DL (ref 8.5–10.1)
CHLORIDE SERPL-SCNC: 103 MMOL/L (ref 97–108)
CO2 SERPL-SCNC: 26 MMOL/L (ref 21–32)
CREAT SERPL-MCNC: 0.92 MG/DL (ref 0.55–1.02)
CREAT UR-MCNC: 44.3 MG/DL
GLOBULIN SER CALC-MCNC: 3.1 G/DL (ref 2–4)
GLUCOSE SERPL-MCNC: 157 MG/DL (ref 65–100)
MICROALBUMIN UR-MCNC: 12.1 MG/DL
MICROALBUMIN/CREAT UR-RTO: 273 MG/G (ref 0–30)
POTASSIUM SERPL-SCNC: 3.9 MMOL/L (ref 3.5–5.1)
PROT SERPL-MCNC: 7.1 G/DL (ref 6.4–8.2)
SODIUM SERPL-SCNC: 137 MMOL/L (ref 136–145)

## 2021-12-21 DIAGNOSIS — I10 ESSENTIAL HYPERTENSION: ICD-10-CM

## 2021-12-21 RX ORDER — SERTRALINE HYDROCHLORIDE 100 MG/1
TABLET, FILM COATED ORAL
Qty: 90 TABLET | Refills: 0 | Status: SHIPPED | OUTPATIENT
Start: 2021-12-21 | End: 2022-05-15

## 2021-12-21 RX ORDER — ROSUVASTATIN CALCIUM 20 MG/1
TABLET, COATED ORAL
Qty: 90 TABLET | Refills: 1 | Status: SHIPPED | OUTPATIENT
Start: 2021-12-21 | End: 2022-06-05

## 2021-12-21 RX ORDER — LOSARTAN POTASSIUM AND HYDROCHLOROTHIAZIDE 12.5; 5 MG/1; MG/1
TABLET ORAL
Qty: 180 TABLET | Refills: 0 | Status: SHIPPED | OUTPATIENT
Start: 2021-12-21 | End: 2022-04-18

## 2021-12-23 ENCOUNTER — DOCUMENTATION ONLY (OUTPATIENT)
Dept: SLEEP MEDICINE | Age: 76
End: 2021-12-23

## 2021-12-29 ENCOUNTER — TELEPHONE (OUTPATIENT)
Dept: SLEEP MEDICINE | Age: 76
End: 2021-12-29

## 2021-12-29 ENCOUNTER — DOCUMENTATION ONLY (OUTPATIENT)
Dept: SLEEP MEDICINE | Age: 76
End: 2021-12-29

## 2021-12-29 DIAGNOSIS — G47.33 OBSTRUCTIVE SLEEP APNEA (ADULT) (PEDIATRIC): Primary | ICD-10-CM

## 2021-12-29 DIAGNOSIS — Z11.52 ENCOUNTER FOR SCREENING FOR COVID-19: ICD-10-CM

## 2021-12-29 NOTE — TELEPHONE ENCOUNTER
Reviewed sleep study results with patient. She expressed understanding. Patient advised that an order will be faxed to [facility], however, there is a low supply due to the Respironics recall. Patient advised that in the mean time it is her choice if she decides to continue using her Respironics PAP unit or to sleep on her side with her head elevated. Please write prescription for new device. Please fax Rx to [facility].

## 2021-12-29 NOTE — TELEPHONE ENCOUNTER
I was informed by Ms. Morgan Caraballo that the the test done on 12-05-21 was in fact a Bi-Level trial and that the pressure table needs to be put in manually. Once the test data is re-assigned to me for review again, I will generate a revised report and a new prescription for a Bi-Level Device. The order below for a CPAP device should be discarded.

## 2021-12-29 NOTE — TELEPHONE ENCOUNTER
Orders Placed This Encounter    AMB SUPPLY ORDER     Diagnosis: Obstructive Sleep Apnea ICD-10 Code (G47.33)    Positive Airway Pressure Therapy: Duration of need: 99 months. APAP Device with Heated Humidifer [id number] / [id number]. Minimum Pressure: 9 cmH2O, Maximum Pressure: 9 cmH2O.     Nasal Pillows Combo Mask (Replace) 2 per month.  Nasal Pillows (Replace) 2 per month.  Full Face Mask 1 every 3 months.  Full Face Mask Cushion 1 per month.  Nasal Cushion (Replace) 2 per month.  Nasal Interface Mask 1 every 3 months.  Headgear 1 every 6 months.  Chinstrap 1 every 6 months.  Tubing 1 every 3 months.  Filter(s) Disposable 2 per month.  Filter(s) Non-Disposable 1 every 6 months. .   [facility] for Humidifier (Replace) 1 every 6 months. Perform Mask Fitting per patient preference and comfort - replace as above. Ciarra Grover MD, FAASM; NPI: [id number]  Electronically signed. 12/29/21

## 2021-12-29 NOTE — TELEPHONE ENCOUNTER
Revised interpretation generated based on raw data in G3.  Tabulated data to be revised by lead technologist.

## 2022-01-06 ENCOUNTER — DOCUMENTATION ONLY (OUTPATIENT)
Dept: SLEEP MEDICINE | Age: 77
End: 2022-01-06

## 2022-01-06 NOTE — TELEPHONE ENCOUNTER
Orders Placed This Encounter    AMB SUPPLY ORDER     Diagnosis: Sleep Apnea ICD-10 Code (G47.30); ICD-9 Code (780.57). Positive Airway Pressure Therapy: Duration of need: 99 months. ResMed VPAP Auto (VAuto Mode): Maximum IPAP: 20 cmH2O; Minimum EPAP: 09 cmH2O; PS: 06 cmH2O. Ramp Time: 30 Minutes. CPAP mask -  As fitted during titration OR patient preference, headgear, tubing, and filter;  heated humidifier; wireless modem. Remote monitoring enrollment. Ahsan Street MD, FAASM; NPI: [id number]  Electronically signed. 01/06/22

## 2022-01-07 ENCOUNTER — DOCUMENTATION ONLY (OUTPATIENT)
Dept: SLEEP MEDICINE | Age: 77
End: 2022-01-07

## 2022-01-07 ENCOUNTER — HOSPITAL ENCOUNTER (OUTPATIENT)
Dept: CT IMAGING | Age: 77
Discharge: HOME | End: 2022-01-07
Attending: INTERNAL MEDICINE
Payer: MEDICARE

## 2022-01-07 DIAGNOSIS — R91.8 PULMONARY NODULES: ICD-10-CM

## 2022-01-07 PROCEDURE — 71250 CT THORAX DX C-: CPT

## 2022-03-18 PROBLEM — E11.21 TYPE 2 DIABETES WITH NEPHROPATHY (HCC): Status: ACTIVE | Noted: 2018-04-26

## 2022-03-19 PROBLEM — N39.42 INCONTINENCE WITHOUT SENSORY AWARENESS: Status: ACTIVE | Noted: 2020-04-08

## 2022-03-19 PROBLEM — E78.2 MIXED HYPERLIPIDEMIA: Status: ACTIVE | Noted: 2020-02-25

## 2022-03-19 PROBLEM — N39.41 URGE INCONTINENCE OF URINE: Status: ACTIVE | Noted: 2020-02-10

## 2022-03-19 PROBLEM — E66.01 SEVERE OBESITY (BMI 35.0-39.9) WITH COMORBIDITY (HCC): Status: ACTIVE | Noted: 2018-04-26

## 2022-03-19 PROBLEM — Z71.89 ADVANCED CARE PLANNING/COUNSELING DISCUSSION: Status: ACTIVE | Noted: 2017-07-21

## 2022-03-19 PROBLEM — N39.3 FEMALE STRESS INCONTINENCE: Status: ACTIVE | Noted: 2020-02-10

## 2022-04-12 ENCOUNTER — TELEPHONE (OUTPATIENT)
Dept: SLEEP MEDICINE | Age: 77
End: 2022-04-12

## 2022-04-12 NOTE — TELEPHONE ENCOUNTER
LVM to notify the patient that her 5/12/22 appointment has been canceled at her request per voice mail message.

## 2022-04-17 DIAGNOSIS — I10 ESSENTIAL HYPERTENSION: ICD-10-CM

## 2022-04-18 RX ORDER — LOSARTAN POTASSIUM AND HYDROCHLOROTHIAZIDE 12.5; 5 MG/1; MG/1
TABLET ORAL
Qty: 180 TABLET | Refills: 0 | Status: SHIPPED | OUTPATIENT
Start: 2022-04-18 | End: 2022-07-17

## 2022-05-15 RX ORDER — SERTRALINE HYDROCHLORIDE 100 MG/1
TABLET, FILM COATED ORAL
Qty: 90 TABLET | Refills: 0 | Status: SHIPPED | OUTPATIENT
Start: 2022-05-15 | End: 2022-08-01

## 2022-05-16 NOTE — TELEPHONE ENCOUNTER
Please call patient, no further refills until office visit. If bridge fill is needed due to NOV > 30 days, please place order.  (Malorie Line)

## 2022-05-19 ENCOUNTER — OFFICE VISIT (OUTPATIENT)
Dept: SLEEP MEDICINE | Age: 77
End: 2022-05-19
Payer: MEDICARE

## 2022-05-19 VITALS
BODY MASS INDEX: 34.24 KG/M2 | WEIGHT: 186.1 LBS | SYSTOLIC BLOOD PRESSURE: 121 MMHG | OXYGEN SATURATION: 96 % | HEIGHT: 62 IN | TEMPERATURE: 98.2 F | DIASTOLIC BLOOD PRESSURE: 71 MMHG | HEART RATE: 58 BPM

## 2022-05-19 DIAGNOSIS — I10 ESSENTIAL HYPERTENSION WITH GOAL BLOOD PRESSURE LESS THAN 130/80: ICD-10-CM

## 2022-05-19 DIAGNOSIS — G47.33 OBSTRUCTIVE SLEEP APNEA (ADULT) (PEDIATRIC): Primary | ICD-10-CM

## 2022-05-19 DIAGNOSIS — E11.21 TYPE 2 DIABETES WITH NEPHROPATHY (HCC): ICD-10-CM

## 2022-05-19 PROCEDURE — 1101F PT FALLS ASSESS-DOCD LE1/YR: CPT | Performed by: INTERNAL MEDICINE

## 2022-05-19 PROCEDURE — 1090F PRES/ABSN URINE INCON ASSESS: CPT | Performed by: INTERNAL MEDICINE

## 2022-05-19 PROCEDURE — G8400 PT W/DXA NO RESULTS DOC: HCPCS | Performed by: INTERNAL MEDICINE

## 2022-05-19 PROCEDURE — G8417 CALC BMI ABV UP PARAM F/U: HCPCS | Performed by: INTERNAL MEDICINE

## 2022-05-19 PROCEDURE — G8536 NO DOC ELDER MAL SCRN: HCPCS | Performed by: INTERNAL MEDICINE

## 2022-05-19 PROCEDURE — G8432 DEP SCR NOT DOC, RNG: HCPCS | Performed by: INTERNAL MEDICINE

## 2022-05-19 PROCEDURE — G8427 DOCREV CUR MEDS BY ELIG CLIN: HCPCS | Performed by: INTERNAL MEDICINE

## 2022-05-19 PROCEDURE — 99213 OFFICE O/P EST LOW 20 MIN: CPT | Performed by: INTERNAL MEDICINE

## 2022-05-19 PROCEDURE — G8752 SYS BP LESS 140: HCPCS | Performed by: INTERNAL MEDICINE

## 2022-05-19 PROCEDURE — G8754 DIAS BP LESS 90: HCPCS | Performed by: INTERNAL MEDICINE

## 2022-05-19 NOTE — PROGRESS NOTES
[facility] [place]. [place], [address]  Tel.  [phone]  Fax. [facility]  [place], [address] [facility]  Tel.  [phone]  Fax. [phone] [address]  Tel.  [phone]  Fax. Nela Epps (: 1945) is a 68 y.o. female, established patient, seen for positive airway pressure follow-up and evaluation of the following chief complaint(s):   Sleep Problem (F2F_ 1st Adherence_ bring device)       Ban Jacques was last seen by me on 10-21-21, prior notes reviewed in detail. Home sleep test 3/2016 showed AHI of 18.6/hr with a lowest SaO2 of 71%, duration of SaO2 < 88% 125 min. She was COVID + in [address] [place] [address] Avenue was performed again on 21 due to interim weight loss and indicated an average AHI of 24.4 per hour with an SpO2 johanna of 81%, the duration of SpO2 <88% was 8.1 minutes. She returned for a CPAP trial on 10-04-21, Sleep Testing was indicative of sleep fragmentation on CPAP therapy indicative of CPAP Failure. SpO2 johanna of 82%, the duration of SpO2 <88% was 21 minutes. ASSESSMENT/PLAN:    ICD-10-CM ICD-9-CM    1. Obstructive sleep apnea (adult) (pediatric)  G47.33 327.23    2. Essential hypertension with goal blood pressure less than 130/80  I10 401.9    3. Type 2 diabetes with nephropathy (HCC)  E11.21 250.40      583.81    4. BMI 34.0-34.9,adult  Z68.34 V85.34        On ResMed:  AirCurve 10 VAuto:      Settings:  Mode - VAuto    Max IPAP: 20 cmH2O    Min EPAP: 09 cmH2O    PS: 06 cmH2O      She is adherent with PAP therapy and PAP continues to benefit patient and remains necessary for control of her sleep apnea. 1. Sleep Apnea -   * Continue on current pressures    * Supplies for his device were ordered as noted below:    Orders Placed This Encounter    AMB SUPPLY ORDER     Diagnosis: Sleep Apnea ICD-10 Code (G47.30); ICD-9 Code (780.57).     CPAP mask - Perform mask fit and provide patient with appropriate mask / headgear. Edy Leong MD, [facility]; NPI: [id number]  Electronically signed. 05/19/22       * She is familiar with the telephone monitoring application, is willing to track therapy and agrees to notify use if AHI is >5 per hour. * Counseling was provided regarding the importance of regular PAP use with emphasis on ensuring sufficient total sleep time, proper sleep hygiene, and safe driving. * Re-enforced proper and regular cleaning for the device. * She was asked to contact our office for any problems regarding PAP therapy. 2. Recommended a dedicated weight loss program through appropriate diet and exercise regimen as significant weight reduction has been shown to reduce severity of obstructive sleep apnea. SUBJECTIVE/OBJECTIVE:    She  is seen today for follow up on PAP device and reports no problems using the device. The following concerns identified:    Drowsiness no Problems exhaling no   Snoring no Forget to put on no   Mask Comfortable yes Can't fall asleep no   Dry Mouth no Mask falls off no   Air Leaking no Frequent awakenings no       She admits that her sleep has improved on PAP therapy using FF mask and non-heated tubing. Review of device download indicated:    Usage Days >= 4 hours 70 % over 30 days  Median Usage  6 hour 9 minutes    Average AHI: 2.8 /hr which reflects improved sleep breathing condition. [place] Sleepiness Score: (P) 10   Modified F.O.S.Q. Score Total / 2: (P) 16       Sleep Review of Systems: notable for Negative difficulty falling asleep; Negative awakenings at night; Negative  early morning headaches; Positive  memory problems; Negative  concentration issues; Negative  chest pain; Negative  shortness of breath;  Negative rashes or itching; Negative heartburn / belching / flatulence; Negative  significant mood issues; 1-2 afternoon naps per week.     Visit Vitals  /71 (BP 1 Location: Left upper arm, BP Patient Position: Sitting, BP Cuff Size: Adult)   Pulse (!) 58   Temp 98.2 °F (36.8 °C) (Temporal)   Ht 5' 2\" (1.575 m)   Wt 186 lb 1.6 oz (84.4 kg)   SpO2 96%   BMI 34.04 kg/m²         General:   Not in acute distress   Eyes:  Anicteric sclerae, no obvious strabismus   Nose:  No obvious nasal septum deviation    Oropharynx:   Class 4 oropharyngeal outlet, thick tongue base, uvula not seen due to low-lying soft palate, narrow tonsilo-pharyngeal pilars   Tonsils:   tonsils are not visualized due to low-lying soft palate   Neck:   midline trachea   Chest/Lungs:  Equal lung expansion, clear on auscultation    CVS:  Normal rate, regular rhythm; no JVD   Skin:  Warm to touch; no obvious rashes   Neuro:  No focal deficits ; no obvious tremor    Psych:  Normal affect,  normal countenance; An electronic signature was used to authenticate this note. Diana Ndiaye MD, FAASM  Electronically signed.  05/19/22

## 2022-05-19 NOTE — PATIENT INSTRUCTIONS
[address] [place]. [place], [address]  Tel.  [phone]  Fax. [address] [facility] [address]  [place], [address]  Tel.  [phone]  Fax. [phone] [address]  Tel.  [phone]  Fax. [phone]     Learning About CPAP for Sleep Apnea  What is CPAP? CPAP is a small machine that you use at home every night while you sleep. It increases air pressure in your throat to keep your airway open. When you have sleep apnea, this can help you sleep better so you feel much better. CPAP stands for \"continuous positive airway pressure. \"  The CPAP machine will have one of the following:  · A mask that covers your nose and mouth  · Prongs that fit into your nose  · A mask that covers your nose only, the most common type. This type is called NCPAP. The N stands for \"nasal.\"  Why is it done? CPAP is usually the best treatment for obstructive sleep apnea. It is the first treatment choice and the most widely used. Your doctor may suggest CPAP if you have:  · Moderate to severe sleep apnea. · Sleep apnea and coronary artery disease (CAD) or heart failure. How does it help? · CPAP can help you have more normal sleep, so you feel less sleepy and more alert during the daytime. · CPAP may help keep heart failure or other heart problems from getting worse. · NCPAP may help lower your blood pressure. · If you use CPAP, your bed partner may also sleep better because you are not snoring or restless. What are the side effects? Some people who use CPAP have:  · A dry or stuffy nose and a sore throat. · Irritated skin on the face. · Sore eyes. · Bloating. If you have any of these problems, work with your doctor to fix them. Here are some things you can try:  · Be sure the mask or nasal prongs fit well. · See if your doctor can adjust the pressure of your CPAP. · If your nose is dry, try a humidifier.   · If your nose is runny or stuffy, try decongestant medicine or a steroid nasal spray. If these things do not help, you might try a different type of machine. Some machines have air pressure that adjusts on its own. Others have air pressures that are different when you breathe in than when you breathe out. This may reduce discomfort caused by too much pressure in your nose. Where can you learn more? Go to [employer]  Enter Danielle Ragsdale in the search box to learn more about \"Learning About CPAP for Sleep Apnea. \"   © 2388-3647 [employer]. Care instructions adapted under license by [employer] (which disclaims liability or warranty for this information). This care instruction is for use with your licensed healthcare professional. If you have questions about a medical condition or this instruction, always ask your healthcare professional. [address] any warranty or liability for your use of this information. Content Version: 7.5.50097; Last Revised: January 11, 2010  PROPER SLEEP HYGIENE    What to avoid  · Do not have drinks with caffeine, such as coffee or black tea, for 8 hours before bed. · Do not smoke or use other types of tobacco near bedtime. Nicotine is a stimulant and can keep you awake. · Avoid drinking alcohol late in the evening, because it can cause you to wake in the middle of the night. · Do not eat a big meal close to bedtime. If you are hungry, eat a light snack. · Do not drink a lot of water close to bedtime, because the need to urinate may wake you up during the night. · Do not read or watch TV in bed. Use the bed only for sleeping and sexual activity. What to try  · Go to bed at the same time every night, and wake up at the same time every morning. Do not take naps during the day. · Keep your bedroom quiet, dark, and cool. · Get regular exercise, but not within 3 to 4 hours of your bedtime. .  · Sleep on a comfortable pillow and mattress.   · If watching the clock makes you anxious, turn it facing away from you so you cannot see the time. · If you worry when you lie down, start a worry book. Well before bedtime, write down your worries, and then set the book and your concerns aside. · Try meditation or other relaxation techniques before you go to bed. · If you cannot fall asleep, get up and go to another room until you feel sleepy. Do something relaxing. Repeat your bedtime routine before you go to bed again. · Make your house quiet and calm about an hour before bedtime. Turn down the lights, turn off the TV, log off the computer, and turn down the volume on music. This can help you relax after a busy day. Drowsy Driving: The [employer] cites drowsiness as a causing factor in more than 030,417 police reported crashes annually, resulting in 76,000 injuries and 1,500 deaths. Other surveys suggest 55% of people polled have driven while drowsy in the past year, 23% had fallen asleep but not crashed, 3% crashed, and 2% had and accident due to drowsy driving. Who is at risk? Young Drivers: One study of drowsy driving accidents states that 55% of the drivers were under 25 years. Of those, 75% were male. Shift Workers and Travelers: People who work overnight or travel across time zones frequently are at higher risk of experiencing Circadian Rhythm Disorders. They are trying to work and function when their body is programed to sleep. Sleep Deprived: Lack of sleep has a serious impact on your ability to pay attention or focus on a task. Consistently getting less than the average of 8 hours your body needs creates partial or cumulative sleep deprivation. Untreated Sleep Disorders: Sleep Apnea, Narcolepsy, R.L.S., and other sleep disorders (untreated) prevent a person from getting enough restful sleep. This leads to excessive daytime sleepiness and increases the risk for drowsy driving accidents by up to 7 times.   Medications / Alcohol: Even over the counter medications can cause drowsiness. Medications that impair a drivers attention should have a warning label. Alcohol naturally makes you sleepy and on its own can cause accidents. Combined with excessive drowsiness its effects are amplified. Signs of Drowsy Driving:   * You don't remember driving the last few miles   * You may drift out of your jerry   * You are unable to focus and your thoughts wander   * You may yawn more often than normal   * You have difficulty keeping your eyes open / nodding off   * Missing traffic signs, speeding, or tailgating  Prevention-   Good sleep hygiene, lifestyle and behavioral choices have the most impact on drowsy driving. There is no substitute for sleep and the average person requires 8 hours nightly. If you find yourself driving drowsy, stop and sleep. Consider the sleep hygiene tips provided during your visit as well. Medication Refill Policy: Refills for all medications require 1 week advance notice. Please have your pharmacy fax a refill request. We are unable to fax, or call in \"controled substance\" medications and you will need to pick these prescriptions up from our office. [employer] Activation    Thank you for requesting access to [employer]. Please follow the instructions below to securely access and download your online medical record. [employer] allows you to send messages to your doctor, view your test results, renew your prescriptions, schedule appointments, and more. How Do I Sign Up? 1. In your internet browser, go to [email or web address]. 2. Click on the First Time User? Click Here link in the Sign In box. You will see the New Member Sign Up page. 3. Enter your [employer] Access Code exactly as it appears below. You will not need to use this code after youve completed the sign-up process. If you do not sign up before the expiration date, you must request a new code. [employer] Access Code:  Activation code not generated  Current [employer] Status: Active (This is the date your [employer] access code will )    4. Enter the last four digits of your Social Security Number (xxxx) and Date of Birth (mm/dd/yyyy) as indicated and click Submit. You will be taken to the next sign-up page. 5. Create a [employer] ID. This will be your [employer] login ID and cannot be changed, so think of one that is secure and easy to remember. 6. Create a [employer] password. You can change your password at any time. 7. Enter your Password Reset Question and Answer. This can be used at a later time if you forget your password. 8. Enter your e-mail address. You will receive e-mail notification when new information is available in [address]. 9. Click Sign Up. You can now view and download portions of your medical record. 10. Click the Download Summary menu link to download a portable copy of your medical information. Additional Information    If you have questions, please call [phone]. Remember, [employer] is NOT to be used for urgent needs. For medical emergencies, dial 911.

## 2022-05-27 ENCOUNTER — OFFICE VISIT (OUTPATIENT)
Dept: INTERNAL MEDICINE CLINIC | Age: 77
End: 2022-05-27
Payer: MEDICARE

## 2022-05-27 VITALS
OXYGEN SATURATION: 93 % | SYSTOLIC BLOOD PRESSURE: 133 MMHG | HEIGHT: 62 IN | HEART RATE: 56 BPM | WEIGHT: 184 LBS | TEMPERATURE: 97.6 F | BODY MASS INDEX: 33.86 KG/M2 | DIASTOLIC BLOOD PRESSURE: 78 MMHG | RESPIRATION RATE: 14 BRPM

## 2022-05-27 DIAGNOSIS — I10 ESSENTIAL HYPERTENSION: ICD-10-CM

## 2022-05-27 DIAGNOSIS — Z13.31 SCREENING FOR DEPRESSION: ICD-10-CM

## 2022-05-27 DIAGNOSIS — N18.31 STAGE 3A CHRONIC KIDNEY DISEASE (HCC): ICD-10-CM

## 2022-05-27 DIAGNOSIS — E11.21 TYPE 2 DIABETES WITH NEPHROPATHY (HCC): ICD-10-CM

## 2022-05-27 DIAGNOSIS — Z12.11 SCREEN FOR COLON CANCER: ICD-10-CM

## 2022-05-27 DIAGNOSIS — G47.30 SLEEP APNEA, UNSPECIFIED TYPE: ICD-10-CM

## 2022-05-27 DIAGNOSIS — Z78.0 POSTMENOPAUSAL STATE: ICD-10-CM

## 2022-05-27 DIAGNOSIS — Z00.00 MEDICARE ANNUAL WELLNESS VISIT, SUBSEQUENT: Primary | ICD-10-CM

## 2022-05-27 DIAGNOSIS — Z12.31 ENCOUNTER FOR SCREENING MAMMOGRAM FOR MALIGNANT NEOPLASM OF BREAST: ICD-10-CM

## 2022-05-27 DIAGNOSIS — Z13.39 SCREENING FOR ALCOHOLISM: ICD-10-CM

## 2022-05-27 PROBLEM — N18.30 CHRONIC RENAL DISEASE, STAGE III (HCC): Status: ACTIVE | Noted: 2022-05-27

## 2022-05-27 PROCEDURE — G8754 DIAS BP LESS 90: HCPCS | Performed by: INTERNAL MEDICINE

## 2022-05-27 PROCEDURE — 99213 OFFICE O/P EST LOW 20 MIN: CPT | Performed by: INTERNAL MEDICINE

## 2022-05-27 PROCEDURE — G0463 HOSPITAL OUTPT CLINIC VISIT: HCPCS | Performed by: INTERNAL MEDICINE

## 2022-05-27 PROCEDURE — 1090F PRES/ABSN URINE INCON ASSESS: CPT | Performed by: INTERNAL MEDICINE

## 2022-05-27 PROCEDURE — G8536 NO DOC ELDER MAL SCRN: HCPCS | Performed by: INTERNAL MEDICINE

## 2022-05-27 PROCEDURE — G8510 SCR DEP NEG, NO PLAN REQD: HCPCS | Performed by: INTERNAL MEDICINE

## 2022-05-27 PROCEDURE — G8417 CALC BMI ABV UP PARAM F/U: HCPCS | Performed by: INTERNAL MEDICINE

## 2022-05-27 PROCEDURE — G8427 DOCREV CUR MEDS BY ELIG CLIN: HCPCS | Performed by: INTERNAL MEDICINE

## 2022-05-27 PROCEDURE — G8400 PT W/DXA NO RESULTS DOC: HCPCS | Performed by: INTERNAL MEDICINE

## 2022-05-27 PROCEDURE — 1101F PT FALLS ASSESS-DOCD LE1/YR: CPT | Performed by: INTERNAL MEDICINE

## 2022-05-27 PROCEDURE — G0439 PPPS, SUBSEQ VISIT: HCPCS | Performed by: INTERNAL MEDICINE

## 2022-05-27 PROCEDURE — G8752 SYS BP LESS 140: HCPCS | Performed by: INTERNAL MEDICINE

## 2022-05-27 RX ORDER — INSULIN DEGLUDEC INJECTION 100 U/ML
INJECTION, SOLUTION SUBCUTANEOUS
COMMUNITY
Start: 2022-05-04 | End: 2022-05-27

## 2022-05-27 RX ORDER — INSULIN DEGLUDEC INJECTION 100 U/ML
INJECTION, SOLUTION SUBCUTANEOUS
Qty: 3 PEN | Refills: 0
Start: 2022-05-27

## 2022-05-27 RX ORDER — FINERENONE 20 MG/1
1 TABLET, FILM COATED ORAL DAILY
Qty: 30 TABLET | Refills: 1
Start: 2022-05-27

## 2022-05-27 NOTE — PROGRESS NOTES
Diagnoses and all orders for this visit:    Discussed with patient and . Mrs. Goodman's cardiovascular profile has significantly improved. Blood pressure optimally controlled, diabetes improved and likely closer to A1c of 7 with transition to [employer] and freestyle mendel blood sugar checks. Lipids well controlled and sleep apnea improved. Encouraged work outs to 3-4 times per week from 2 times a week    1. Medicare annual wellness visit, subsequent  Medicare wellness completed  Patient has advance directives  -     REFERRAL TO OPHTHALMOLOGY  -     REFERRAL TO ENDOCRINOLOGY  -     insulin degludec Mamie Contes FlexTouch U-100) 100 unit/mL (3 mL) inpn; Taking 28 untis qhs Dr. Margarita Muñiz  -     finerenone Laymond Mariza) 20 mg tab; Take 1 Tablet by mouth daily. Dr. Elizabeth Johnston rxing    2. Screen for colon cancer  Per chart review patient had a colonoscopy in 2013. She had this done at [address] [place] [address] and was told to not repeat unless she was having symptoms.  -     OCCULT BLOOD IMMUNOASSAY,DIAGNOSTIC    3. Encounter for screening mammogram for malignant neoplasm of breast  Discussed with patient and will have mammogram annually  -     [facility] MAMMO BI SCREENING INCL CAD; Future    4. Postmenopausal state  Due for bone density  Follow-up with Dr. Elizabeth Johnston  -     Mallorie Palomo; Future    5. Screening for alcoholism  No alcohol issues    6. Screening for depression  No depression issues    7. Essential hypertension  Nicely controlled    8. Stage 3a chronic kidney disease ([address]  Stable  Last labs from Dr. Elizabeth Johnston these are in [facility]. 9. Type 2 diabetes with nephropathy ([address]  Spiking between noon and 6. Would need Humalog coverage prior to snacks      10.  Sleep apnea, unspecified type  Encourage patient to be compliant with her BiPAP              Follow-up with specialists: Endocrinology, cardiology  Follow-up in 6 months or earlier if needed      Chief Complaint   Patient presents with    Medication Refill Cardiovascular profile  Going to [facility]  twice a week  Bicycle, treadmill, weight exercises    Diabetes   She is being followed by Dr. Ruth Eaton. She is wearing a free mendel. Her hemoglobin A1c is closer to 7. She has been hungry after lunch and has snacks before dinner. Her freestyle [facility] shows spike. She has been seen by the dietitian and making some changes. She denies hypoglycemia. She is tolerating the Metformin. Trulicity 3 mg. [place] and [employer] between noon and 6 pm      Subjective:   Andrea Rowe is a 68 y.o. female with hypertension. Hypertension ROS: taking medications as instructed, no medication side effects noted, no TIA's, no chest pain on exertion, no dyspnea on exertion, no swelling of ankles. New concerns: None. SUBJECTIVE: Andrea Rowe is a 68 y.o. female here for follow up of hypothyroidism. Lab Results   Component Value Date/Time    TSH 1.94 2021 11:50 AM     Thyroid ROS: denies fatigue, weight changes, heat/cold intolerance, bowel/skin changes or CVS symptoms. Memory impairment  No complaints at this visit. Past Medical History:   Diagnosis Date    Arthritis     CAD (coronary artery disease)     STENT PLACED>PLAQUE BROKE OFF--HAD \"MINOR MI\"    Deviated septum 13    HAS TROUBLE BREATHING THROUGH LEFT SIDE; CT SHOWED POLYP    Diabetes ([address]     Heart disease     Hypertension     Incontinence     Nasal polyp     Unspecified sleep apnea 2013    biphasic /bipap     Past Surgical History:   Procedure Laterality Date    HX  SECTION      X3    HX COLONOSCOPY      HX HEENT  ?     SEPTOPLASTY    [place] CARDIAC SURG PROCEDURE UNLIST      CARDIAC CATH;ANGIOPLASTY WITH STENT     Social History     Socioeconomic History    Marital status:    Tobacco Use    Smoking status: Former Smoker     Packs/day: 1.00     Years: 5.00     Pack years: 5.00     Quit date: 1970     Years since quittin.4    Smokeless tobacco: Never Used   Substance and Sexual Activity    Alcohol use: Not Currently    Drug use: No   Social History Narrative            3 children women 36, 40, 37 healthy other than skin issues     Family History   Problem Relation Age of Onset    Heart Disease Mother     Heart Surgery Mother         VALVE REPLACED AT AGE 72    Heart Attack Father 46        MASSIVE     Anesth Problems Neg Hx      Current Outpatient Medications   Medication Sig Dispense Refill    insulin degludec Shavonne Reeve FlexTouch U-100) 100 unit/mL (3 mL) inpn 40 units      sertraline (ZOLOFT) 100 mg tablet TAKE 1 TABLET BY MOUTH EVERY DAY IN THE EVENING 90 Tablet 0    losartan-hydroCHLOROthiazide (HYZAAR) 50-12.5 mg per tablet TAKE 1 TABLET BY MOUTH TWICE A  Tablet 0    rosuvastatin (CRESTOR) 20 mg tablet TAKE 1 TABLET BY MOUTH EVERYDAY AT BEDTIME 90 Tablet 1    ondansetron (ZOFRAN ODT) 4 mg disintegrating tablet TAKE 1 TAB BY MOUTH EVERY EIGHT (8) HOURS AS NEEDED FOR NAUSEA OR VOMITING. 20 Tablet 1    metoprolol tartrate (LOPRESSOR) 25 mg tablet TAKE 1 TABLET BY MOUTH TWICE A  Tablet 3    levothyroxine (SYNTHROID) 50 mcg tablet TAKE 1 TABLET BY MOUTH EVERY DAY 90 Tablet 2    dapagliflozin (Farxiga) 5 mg tab tablet Take 5 mg by mouth daily.  dulaglutide (Trulicity) 3 RH/7.1 mL pnij by SubCUTAneous route every seven (7) days.  loperamide (IMMODIUM) 2 mg tablet Take 2 tabs by mouth after the first loose stool of the day then take 1 tab after each bowel movement. Do not exceed 8 tablets total per day. 30 Tab 1    cyanocobalamin 1,000 mcg tablet Take 1,000 mcg by mouth daily.  magnesium oxide (MAG-OX) 400 mg tablet TAKE 1 TAB BY MOUTH TWO (2) TIMES A DAY. 240 Tab 1    metFORMIN ER (GLUCOPHAGE XR) 500 mg tablet Take 1,000 mg by mouth two (2) times a day. 3    HUMALOG KWIKPEN 100 unit/mL kwikpen 28 Units by SubCUTAneous route nightly.  Sliding scale  3    Cholecalciferol, Vitamin D3, (VITAMIN D3) 1,000 unit cap Take 1 Cap by mouth nightly. 30 Cap 3    Aspirin, Buffered 81 mg tab Take  by mouth daily.  omega-3 acid ethyl esters (LOVAZA) 1 gram capsule Take 4 g by mouth daily.  insulin glargine (LANTUS) 100 unit/mL injection 20 Units by SubCUTAneous route two (2) times a day. (Patient taking differently: by SubCUTAneous route three (3) times daily (with meals).  Sliding scale) 1 Vial 3     Allergies   Allergen Reactions    Adhesive Rash and Itching    Advil [Ibuprofen] Swelling     FEET SWELL    Ceftin [Cefuroxime Axetil] Itching    Lotensin [Benazepril] Cough    Penicillins Swelling    Sulfa (Sulfonamide Antibiotics) Rash, Itching and Swelling       Review of Systems - General ROS: negative for - chills or fever  Cardiovascular ROS: no chest pain or dyspnea on exertion  Respiratory ROS: no cough, shortness of breath, or wheezing    Visit Vitals  /78 (BP 1 Location: Left upper arm, BP Patient Position: Sitting, BP Cuff Size: Adult)   Pulse (!) 56   Temp 97.6 °F (36.4 °C) (Oral)   Resp 14   Ht 5' 2\" (1.575 m)   Wt 184 lb (83.5 kg)   SpO2 93%   BMI 33.65 kg/m²     Constitutional: [x] Appears well-developed and well-nourished [x] No apparent distress      [] Abnormal -     Mental status: [x] Alert and awake  [x] Oriented to person/place/time [x] Able to follow commands    [] Abnormal -     Eyes:   EOM    [x]  Normal    [] Abnormal -   Sclera  [x]  Normal    [] Abnormal -          Discharge [x]  None visible   [] Abnormal -     HENT: [x] Normocephalic, atraumatic  [] Abnormal -   [x] Mouth/Throat: Mucous membranes are moist    External Ears [x] Normal  [] Abnormal -    Neck: [x] No visualized mass [] Abnormal -     Pulmonary/Chest: [x] Respiratory effort normal   [x] No visualized signs of difficulty breathing or respiratory distress        [] Abnormal -      Musculoskeletal:   [x] Normal gait with no signs of ataxia         [x] Normal range of motion of neck [] Abnormal -     Neurological:        [x] No Facial Asymmetry (Cranial nerve 7 motor function) (limited exam due to video visit)          [x] No gaze palsy        [] Abnormal -          Skin:        [x] No significant exanthematous lesions or discoloration noted on facial skin         [] Abnormal -            Psychiatric:       [x] Normal Affect [] Abnormal -        [x] No Hallucinations                                                                       This is the Subsequent Medicare Annual Wellness Exam, performed 12 months or more after the Initial AWV or the last Subsequent AWV    I have reviewed the patient's medical history in detail and updated the computerized patient record. Assessment/Plan   Education and counseling provided:  Are appropriate based on today's review and evaluation  End-of-Life planning (with patient's consent)  Pneumococcal Vaccine  Screening Mammography  Colorectal cancer screening tests  Bone mass measurement (DEXA)  Screening for glaucoma    1. Medicare annual wellness visit, subsequent  -     [address]  2. Screen for colon cancer  -     OCCULT BLOOD IMMUNOASSAY,DIAGNOSTIC  3. Encounter for screening mammogram for malignant neoplasm of breast  -     [facility] MAMMO BI SCREENING INCL CAD; Future  4. Postmenopausal state  -     DEXA BONE DENSITY STUDY AXIAL; Future  5. Screening for alcoholism  6.  Screening for depression       Depression Risk Factor Screening     3 most recent PHQ Screens 5/27/2022   PHQ Not Done -   Little interest or pleasure in doing things Not at all   Feeling down, depressed, irritable, or hopeless Not at all   Total Score PHQ 2 0       Alcohol & Drug Abuse Risk Screen    Do you average more than 1 drink per night or more than 7 drinks a week:  No    On any one occasion in the past three months have you have had more than 3 drinks containing alcohol:  No          Functional Ability and Level of Safety    Hearing: Hearing is good. Activities of Daily Living: The home contains: no safety equipment. Patient does total self care      Ambulation: with no difficulty     Fall Risk:  Fall Risk Assessment, last 12 mths 5/27/2022   Able to walk? Yes   Fall in past 12 months? 0   Do you feel unsteady? 0   Are you worried about falling 0      Abuse Screen:  Patient is not abused       Cognitive Screening    Has your family/caregiver stated any concerns about your memory: no     Cognitive Screening: Normal - Verbal Fluency Test    Health Maintenance Due     Health Maintenance Due   Topic Date Due    Foot Exam Q1  Never done    Eye Exam Retinal or Dilated  Never done    Bone Densitometry (Dexa) Screening  Never done       Patient Care Team   Patient Care Team:  Laisha Wagner MD as PCP - General (Internal Medicine Physician)  Laisha Wagner MD as PCP - [facility] Empaneled Provider  Cash Lawson MD (Cardiovascular Disease Physician)  Krystyna Loza MD (Endocrinology Physician)    History     Patient Active Problem List   Diagnosis Code    CAD (coronary artery disease) I25.10    Diabetes ([address] E11.9    Hypertension I10    Arthritis M19.90    Heart disease I51.9    Advanced care planning/counseling discussion Z71.89    Type 2 diabetes with nephropathy ([address] E11.21    Severe obesity (BMI 35.0-39. 9) with comorbidity ([address] E66.01    Mixed hyperlipidemia E78.2    Urge incontinence of urine N39.41    Incontinence without sensory awareness N39.42    Female stress incontinence N39.3     Past Medical History:   Diagnosis Date    Arthritis     CAD (coronary artery disease) 2006    STENT PLACED>PLAQUE BROKE OFF--HAD \"MINOR MI\"    Deviated septum 12/20/13    HAS TROUBLE BREATHING THROUGH LEFT SIDE; CT SHOWED POLYP    Diabetes ([address]     Heart disease     Hypertension     Incontinence     Nasal polyp     Unspecified sleep apnea 12/20/2013    biphasic /bipap      Past Surgical History:   Procedure Laterality Date    HX  SECTION      X3    HX COLONOSCOPY  2011    HX HEENT  ? SEPTOPLASTY    [place] CARDIAC SURG PROCEDURE UNLIST      CARDIAC CATH;ANGIOPLASTY WITH STENT     Current Outpatient Medications   Medication Sig Dispense Refill    insulin degludec Myers Bring FlexTouch U-100) 100 unit/mL (3 mL) inpn 40 units      sertraline (ZOLOFT) 100 mg tablet TAKE 1 TABLET BY MOUTH EVERY DAY IN THE EVENING 90 Tablet 0    losartan-hydroCHLOROthiazide (HYZAAR) 50-12.5 mg per tablet TAKE 1 TABLET BY MOUTH TWICE A  Tablet 0    rosuvastatin (CRESTOR) 20 mg tablet TAKE 1 TABLET BY MOUTH EVERYDAY AT BEDTIME 90 Tablet 1    ondansetron (ZOFRAN ODT) 4 mg disintegrating tablet TAKE 1 TAB BY MOUTH EVERY EIGHT (8) HOURS AS NEEDED FOR NAUSEA OR VOMITING. 20 Tablet 1    metoprolol tartrate (LOPRESSOR) 25 mg tablet TAKE 1 TABLET BY MOUTH TWICE A  Tablet 3    levothyroxine (SYNTHROID) 50 mcg tablet TAKE 1 TABLET BY MOUTH EVERY DAY 90 Tablet 2    dapagliflozin (Farxiga) 5 mg tab tablet Take 5 mg by mouth daily.  dulaglutide (Trulicity) 3 ZQ/9.8 mL pnij by SubCUTAneous route every seven (7) days.  loperamide (IMMODIUM) 2 mg tablet Take 2 tabs by mouth after the first loose stool of the day then take 1 tab after each bowel movement. Do not exceed 8 tablets total per day. 30 Tab 1    cyanocobalamin 1,000 mcg tablet Take 1,000 mcg by mouth daily.  magnesium oxide (MAG-OX) 400 mg tablet TAKE 1 TAB BY MOUTH TWO (2) TIMES A DAY. 240 Tab 1    metFORMIN ER (GLUCOPHAGE XR) 500 mg tablet Take 1,000 mg by mouth two (2) times a day. 3    HUMALOG KWIKPEN 100 unit/mL kwikpen 28 Units by SubCUTAneous route nightly. Sliding scale  3    Cholecalciferol, Vitamin D3, (VITAMIN D3) 1,000 unit cap Take 1 Cap by mouth nightly. 30 Cap 3    Aspirin, Buffered 81 mg tab Take  by mouth daily.  omega-3 acid ethyl esters (LOVAZA) 1 gram capsule Take 4 g by mouth daily.       insulin glargine (LANTUS) 100 unit/mL injection 20 Units by SubCUTAneous route two (2) times a day. (Patient taking differently: by SubCUTAneous route three (3) times daily (with meals). Sliding scale) 1 Vial 3     Allergies   Allergen Reactions    Adhesive Rash and Itching    Advil [Ibuprofen] Swelling     FEET SWELL    Ceftin [Cefuroxime Axetil] Itching    Lotensin [Benazepril] Cough    Penicillins Swelling    Sulfa (Sulfonamide Antibiotics) Rash, Itching and Swelling       Family History   Problem Relation Age of Onset    Heart Disease Mother     Heart Surgery Mother         VALVE REPLACED AT AGE 72    Heart Attack Father 46        MASSIVE     Anesth Problems Neg Hx      Social History     Tobacco Use    Smoking status: Former Smoker     Packs/day: 1.00     Years: 5.00     Pack years: 5.00     Quit date: 1970     Years since quittin.4    Smokeless tobacco: Never Used   Substance Use Topics    Alcohol use: Not Currently         Melinda Bueno MD     This medical record was transcribed using an electronic medical records/speech recognition system. Although proofread, it may and can contain electronic, spelling and other errors. Corrections may be executed at a later time. Please contact us for any clarifications as needed. aside from patient's Medicare visit on this date 22  I have spent 20 minutes reviewing previous notes, test results and face to face with the patient discussing the diagnosis and importance of compliance with the treatment plan as well as documenting on the day of the visit.

## 2022-05-27 NOTE — PATIENT INSTRUCTIONS
Medicare Wellness Visit, Female     The best way to live healthy is to have a lifestyle where you eat a well-balanced diet, exercise regularly, limit alcohol use, and quit all forms of tobacco/nicotine, if applicable. Regular preventive services are another way to keep healthy. Preventive services (vaccines, screening tests, monitoring & exams) can help personalize your care plan, which helps you manage your own care. Screening tests can find health problems at the earliest stages, when they are easiest to treat. [facility] follows the current, evidence-based guidelines published by the [facility] ([facility]) when recommending preventive services for our patients. Because we follow these guidelines, sometimes recommendations change over time as research supports it. (For example, mammograms used to be recommended annually. Even though Medicare will still pay for an annual mammogram, the newer guidelines recommend a mammogram every two years for women of average risk). Of course, you and your doctor may decide to screen more often for some diseases, based on your risk and your co-morbidities (chronic disease you are already diagnosed with). Preventive services for you include:  - Medicare offers their members a free annual wellness visit, which is time for you and your primary care provider to discuss and plan for your preventive service needs. Take advantage of this benefit every year!  -All adults over the age of 72 should receive the recommended pneumonia vaccines. Current USPSTF guidelines recommend a series of two vaccines for the best pneumonia protection.   -All adults should have a flu vaccine yearly and a tetanus vaccine every 10 years.   -All adults age 48 and older should receive the shingles vaccines (series of two vaccines).       -All adults age 38-68 who are overweight should have a diabetes screening test once every three years.   -All adults born between [birth year] and 1965 should be screened once for Hepatitis C.  -Other screening tests and preventive services for persons with diabetes include: an eye exam to screen for diabetic retinopathy, a kidney function test, a foot exam, and stricter control over your cholesterol.   -Cardiovascular screening for adults with routine risk involves an electrocardiogram (ECG) at intervals determined by your doctor.   -Colorectal cancer screenings should be done for adults age 54-65 with no increased risk factors for colorectal cancer. There are a number of acceptable methods of screening for this type of cancer. Each test has its own benefits and drawbacks. Discuss with your doctor what is most appropriate for you during your annual wellness visit. The different tests include: colonoscopy (considered the best screening method), a fecal occult blood test, a fecal DNA test, and sigmoidoscopy.    -A bone mass density test is recommended when a woman turns 65 to screen for osteoporosis. This test is only recommended one time, as a screening. Some providers will use this same test as a disease monitoring tool if you already have osteoporosis. -Breast cancer screenings are recommended every other year for women of normal risk, age 54-69.  -Cervical cancer screenings for women over age 72 are only recommended with certain risk factors.      Here is a list of your current Health Maintenance items (your personalized list of preventive services) with a due date:  Health Maintenance Due   Topic Date Due    Diabetic Foot Care  Never done    Eye Exam  Never done    Bone Mineral Density   Never done

## 2022-06-05 RX ORDER — ROSUVASTATIN CALCIUM 20 MG/1
TABLET, COATED ORAL
Qty: 90 TABLET | Refills: 1 | Status: SHIPPED | OUTPATIENT
Start: 2022-06-05

## 2022-06-09 ENCOUNTER — HOSPITAL ENCOUNTER (OUTPATIENT)
Dept: MAMMOGRAPHY | Age: 77
Discharge: HOME | End: 2022-06-09
Attending: INTERNAL MEDICINE
Payer: MEDICARE

## 2022-06-09 DIAGNOSIS — Z78.0 POSTMENOPAUSAL STATE: ICD-10-CM

## 2022-06-09 PROCEDURE — 77080 DXA BONE DENSITY AXIAL: CPT

## 2022-06-09 NOTE — ED NOTES
Pt to radiology via stretcher. Hemigard Postcare Instructions: The HEMIGARD strips are to remain completely dry for at least 5-7 days.

## 2022-06-17 LAB — HEMOCCULT STL QL IA: NEGATIVE

## 2022-07-13 ENCOUNTER — PATIENT MESSAGE (OUTPATIENT)
Dept: INTERNAL MEDICINE CLINIC | Age: 77
End: 2022-07-13

## 2022-07-14 NOTE — TELEPHONE ENCOUNTER
From: Yeyo Woo  To: Karl Berumen MD  Sent: 7/13/2022 4:16 PM EDT  Subject: Mammography    Jef was called by Cristina to schedule an appt at your request for mammography. Last exam was 3/10/2022 performed at [facility]. Test was negative. I don't think this needs to be repeated at this time. Will cancel if you also believe this is not required. If another test was in mind please let us know.  A DEXA was last done at your request.

## 2022-07-17 DIAGNOSIS — I10 ESSENTIAL HYPERTENSION: ICD-10-CM

## 2022-07-17 RX ORDER — LOSARTAN POTASSIUM AND HYDROCHLOROTHIAZIDE 12.5; 5 MG/1; MG/1
TABLET ORAL
Qty: 180 TABLET | Refills: 0 | Status: SHIPPED | OUTPATIENT
Start: 2022-07-17 | End: 2022-10-21

## 2022-07-18 LAB — HBA1C MFR BLD HPLC: 7.5 %

## 2022-08-01 RX ORDER — SERTRALINE HYDROCHLORIDE 100 MG/1
TABLET, FILM COATED ORAL
Qty: 90 TABLET | Refills: 0 | Status: SHIPPED | OUTPATIENT
Start: 2022-08-01 | End: 2022-11-03

## 2022-08-09 ENCOUNTER — TRANSCRIBE ORDER (OUTPATIENT)
Dept: SCHEDULING | Age: 77
End: 2022-08-09

## 2022-08-17 ENCOUNTER — OFFICE VISIT (OUTPATIENT)
Dept: CARDIOLOGY CLINIC | Age: 77
End: 2022-08-17
Payer: MEDICARE

## 2022-08-17 VITALS
BODY MASS INDEX: 34.71 KG/M2 | WEIGHT: 188.6 LBS | HEIGHT: 62 IN | RESPIRATION RATE: 18 BRPM | OXYGEN SATURATION: 95 % | DIASTOLIC BLOOD PRESSURE: 68 MMHG | TEMPERATURE: 98 F | HEART RATE: 75 BPM | SYSTOLIC BLOOD PRESSURE: 112 MMHG

## 2022-08-17 DIAGNOSIS — I25.83 CORONARY ARTERY DISEASE DUE TO LIPID RICH PLAQUE: Primary | ICD-10-CM

## 2022-08-17 DIAGNOSIS — E78.2 MIXED HYPERLIPIDEMIA: ICD-10-CM

## 2022-08-17 DIAGNOSIS — I25.10 CORONARY ARTERY DISEASE DUE TO LIPID RICH PLAQUE: Primary | ICD-10-CM

## 2022-08-17 PROCEDURE — G8752 SYS BP LESS 140: HCPCS | Performed by: INTERNAL MEDICINE

## 2022-08-17 PROCEDURE — G8417 CALC BMI ABV UP PARAM F/U: HCPCS | Performed by: INTERNAL MEDICINE

## 2022-08-17 PROCEDURE — 1090F PRES/ABSN URINE INCON ASSESS: CPT | Performed by: INTERNAL MEDICINE

## 2022-08-17 PROCEDURE — 1101F PT FALLS ASSESS-DOCD LE1/YR: CPT | Performed by: INTERNAL MEDICINE

## 2022-08-17 PROCEDURE — G8399 PT W/DXA RESULTS DOCUMENT: HCPCS | Performed by: INTERNAL MEDICINE

## 2022-08-17 PROCEDURE — G8536 NO DOC ELDER MAL SCRN: HCPCS | Performed by: INTERNAL MEDICINE

## 2022-08-17 PROCEDURE — 99214 OFFICE O/P EST MOD 30 MIN: CPT | Performed by: INTERNAL MEDICINE

## 2022-08-17 PROCEDURE — G8427 DOCREV CUR MEDS BY ELIG CLIN: HCPCS | Performed by: INTERNAL MEDICINE

## 2022-08-17 PROCEDURE — G8510 SCR DEP NEG, NO PLAN REQD: HCPCS | Performed by: INTERNAL MEDICINE

## 2022-08-17 PROCEDURE — 1123F ACP DISCUSS/DSCN MKR DOCD: CPT | Performed by: INTERNAL MEDICINE

## 2022-08-17 PROCEDURE — 93010 ELECTROCARDIOGRAM REPORT: CPT | Performed by: INTERNAL MEDICINE

## 2022-08-17 PROCEDURE — G8754 DIAS BP LESS 90: HCPCS | Performed by: INTERNAL MEDICINE

## 2022-08-17 RX ORDER — MELOXICAM 7.5 MG/1
7.5 TABLET ORAL DAILY
COMMUNITY
Start: 2022-08-12 | End: 2022-09-01

## 2022-08-17 RX ORDER — TIRZEPATIDE 10 MG/.5ML
5 INJECTION, SOLUTION SUBCUTANEOUS
COMMUNITY

## 2022-08-17 RX ORDER — FLASH GLUCOSE SENSOR
KIT MISCELLANEOUS
COMMUNITY
Start: 2022-08-12

## 2022-08-17 NOTE — LETTER
8/17/2022    Patient: Scott Crowell   YOB: 1945   Date of Visit: 8/17/2022     Ady Pavon, [address] [facility] [address]  [address] [facility]  Via In Basket    Dear Ady Pavon MD,      Thank you for referring Ms. Jessica Baumann to [facility] for evaluation. My notes for this consultation are attached. If you have questions, please do not hesitate to call me. I look forward to following your patient along with you.       Sincerely,    Chase Robb MD

## 2022-08-17 NOTE — PROGRESS NOTES
[place]: Ashleigh Monaco  [phone]          Cardiology Progress Note      Requesting/referring provider: Dr. Yahaira Tejada  Reason for Consult: Coronary artery disease    HPI: Judi Bryan, a 68y.o. year-old who presents for evaluation of elevated calcium score. Mrs. Nusrat Reed has history of hypertension diabetes and coronary artery disease. Apparently in 2004 she underwent a cardiac catheterization was suspected to have possible dissection requiring management with placement of a drug-eluting stent at [facility] by Dr. Sonali Alvarado. She has subsequently not been cathed. Currently she denies any symptoms of chest pain but is easily fatigued and often short of breath with activity. Her day-to-day activity level is pretty low. Recently she underwent a coronary calcium/Agatson score which was significantly elevated close to about 2000. She is here to discuss the results. In 2019 summer, patient was in [place] and had 2 episodes of chest pain for which she had to visit hospital ER. Troponins were negative at that time and they recommended further follow-up with cardiology. She was suggested to undergo a stress test at that time but declined. Over the past 12 months she reports no new episodes of chest discomfort and has done very well. She continues to have exercise intolerance and is morbidly obese and has not been able to lose weight though. ECG: August 2020 sinuss bradycardia nonspecific ST-T changes  Echocardiogram February 2020: Normal LV systolic function, mild LVH, no significant valvular lesions    Assessment/Plan:  1. Coronary artery disease manifested in the form   A. Possible ACS versus coronary dissection in 2004  B. Severe single-vessel disease involving RCA based on history  C. Normal LV systolic function  D. No known subsequent residual disease  E.  Currently no residual angina or heart failure symptoms    2.   Functional intolerance/exercise intolerance potentially related to advancing coronary artery disease  3. Hypertension essential--well controlled  4. Morbid obesity with sleep apnea  5. Type 2 diabetes mellitus  6. Sinus bradycardia with higher doses of beta-blocker    Mrs. Angelica Doty was seen with her  for discussion regarding coronary artery disease. She had elevated calcium scoring prior balloon angioplasty with Dr. Efrain Becerra about 20 years ago. Off-and-on she has atypical chest discomfort for the past 6 months she is angina free. I believe she has underlying likely significant coronary disease but currently that appears to be not causing any symptoms and can be managed medically. She is on optimal medical therapy at this time but due to sinus bradycardia and tiredness,, no-dose of beta-blocker metoprolol to 25 mg twice a day. Chronic cardiovascular problems such as hypertension, diabetes are currently well controlled. She has lost about 10 pounds in the past 12 months. Joni is working on reducing insulin doses. We will see her back in 12 months. We will recheck her lipid profile. Target LDL should be 70 mg/dL. Overall she appears to be less tired and lethargic with sleep apnea treatment which should be continued. We will recheck echocardiogram in 1 year to ensure that LV function is stable. Otherwise currently angina free and does not require any further interventions. Continue current medications including losartan hydrochlorothiazide for blood pressure control, rosuvastatin for lipid control and Morgan Eng for managing heart failure with preserved ejection fraction. Aspirin 81 mg may be continued as well for diagnosis of presumed CAD.       []    High complexity decision making was performed  []    Patient is at high-risk of decompensation with multiple organ involvement  She  has a past medical history of Arthritis, CAD (coronary artery disease) (2006), Deviated septum (12/20/13), Diabetes ([facility] [address] Heart disease, Hypertension, Incontinence, Nasal polyp, and Unspecified sleep apnea (2013). Review of system:Patient reports no PND/Orthpnea/CP. Reports no pedal edema. Over the past few years her weight has significantly increased. Diabetes control is marginal at best.  He reports no cough/fever/focal neurological deficits/abdominal pain. All other systems negative except as above. Family History   Problem Relation Age of Onset    Heart Disease Mother     Heart Surgery Mother         VALVE REPLACED AT AGE 72    Hypertension Mother     Heart Attack Father 46        MASSIVE     Hypertension Father     Diabetes Maternal Aunt     Hypertension Sister     Hypertension Brother     Anesth Problems Neg Hx       Social History     Socioeconomic History    Marital status:    Tobacco Use    Smoking status: Former     Packs/day: 1.00     Years: 5.00     Pack years: 5.00     Types: Cigarettes     Quit date: 1970     Years since quittin.6    Smokeless tobacco: Never   Substance and Sexual Activity    Alcohol use: Not Currently    Drug use: No    Sexual activity: Not Currently     Partners: Male     Birth control/protection: None   Social History Narrative            3 children women 40, 40, 37 healthy other than skin issues      PE  Vitals:    22 1105   BP: 112/68   Pulse: 75   Resp: 18   Temp: 98 °F (36.7 °C)   TempSrc: Oral   SpO2: 95%   Weight: 188 lb 9.6 oz (85.5 kg)   Height: 5' 2\" (1.575 m)    Body mass index is 34.5 kg/m². General:    Alert, cooperative, no distress. Morbidly obese   Psychiatric:    Normal Mood and affect    Eye/ENT:      Pupils equal, No asymmetry, Conjunctival slightly pale. Able to hear voice at normal amplitude   Lungs:      Visibly symmetric chest expansion, No palpable tenderness. Clear to auscultation bilaterally. Heart[de-identified]    Regular rate and rhythm, S1, S2 normal, no murmur, click, rub or gallop. No JVD, Normal palpable peripheral pulses. No cyanosis   Abdomen:     Soft, non-tender. Bowel sounds normal. No masses,  No      organomegaly. Extremities:   Extremities normal, atraumatic, no edema. Neurologic:   CN II-XII grossly intact.  No gross focal deficits           Recent Labs:  Lab Results   Component Value Date/Time    Cholesterol, total 135 2019 10:18 AM    HDL Cholesterol 51 2019 10:18 AM    LDL, calculated 43 2019 10:18 AM    Triglyceride 207 (H) 2019 10:18 AM     Lab Results   Component Value Date/Time    Creatinine 0.92 2021 11:36 AM     Lab Results   Component Value Date/Time    BUN 21 (H) 2021 11:36 AM     Lab Results   Component Value Date/Time    Potassium 3.9 2021 11:36 AM     Lab Results   Component Value Date/Time    Hemoglobin A1c 8.2 (H) 2021 11:50 AM    Hemoglobin A1c, External 7.7 2019 12:00 AM     Lab Results   Component Value Date/Time    HGB 12.6 2020 06:36 PM     Lab Results   Component Value Date/Time    PLATELET 568  06:36 PM       Reviewed:  Past Medical History:   Diagnosis Date    Arthritis     CAD (coronary artery disease) 2006    STENT PLACED>PLAQUE BROKE OFF--HAD \"MINOR MI\"    Deviated septum 13    HAS TROUBLE BREATHING THROUGH LEFT SIDE; CT SHOWED POLYP    Diabetes ([address]     Heart disease     Hypertension     Incontinence     Nasal polyp     Unspecified sleep apnea 2013    biphasic /bipap     Social History     Tobacco Use   Smoking Status Former    Packs/day: 1.00    Years: 5.00    Pack years: 5.00    Types: Cigarettes    Quit date: 1970    Years since quittin.6   Smokeless Tobacco Never     Social History     Substance and Sexual Activity   Alcohol Use Not Currently     Allergies   Allergen Reactions    Adhesive Rash and Itching    Advil [Ibuprofen] Swelling     FEET SWELL    Ceftin [Cefuroxime Axetil] Itching    Lotensin [Benazepril] Cough    Penicillins Swelling    Sulfa (Sulfonamide Antibiotics) Rash, Itching and Swelling     Family History   Problem Relation Age of Onset    Heart Disease Mother     Heart Surgery Mother         VALVE REPLACED AT AGE 72    Hypertension Mother     Heart Attack Father 46        MASSIVE     Hypertension Father     Diabetes Maternal Aunt     Hypertension Sister     Hypertension Brother     Anesth Problems Neg Hx         Current Outpatient Medications   Medication Sig    FreeStyle Radha 14 Day Sensor kit USE AS DIRECTED EVERY 14 DAYS    meloxicam (MOBIC) 7.5 mg tablet Take 7.5 mg by mouth daily. tirzepatide (Mounjaro) 10 mg/0.5 mL pnij 5 mg by SubCUTAneous route. sertraline (ZOLOFT) 100 mg tablet TAKE 1 TABLET BY MOUTH EVERY DAY IN THE EVENING    losartan-hydroCHLOROthiazide (HYZAAR) 50-12.5 mg per tablet TAKE 1 TABLET BY MOUTH TWICE A DAY    rosuvastatin (CRESTOR) 20 mg tablet TAKE 1 TABLET BY MOUTH EVERYDAY AT BEDTIME    insulin degludec Laurian Daughters FlexTouch U-100) 100 unit/mL (3 mL) inpn Taking 28 untis qhs Dr. Tanya Prieot    finerenone Mirna Vanessa) 20 mg tab Take 1 Tablet by mouth daily. Dr. Mcnair Plants rxing    ondansetron (ZOFRAN ODT) 4 mg disintegrating tablet TAKE 1 TAB BY MOUTH EVERY EIGHT (8) HOURS AS NEEDED FOR NAUSEA OR VOMITING. metoprolol tartrate (LOPRESSOR) 25 mg tablet TAKE 1 TABLET BY MOUTH TWICE A DAY    levothyroxine (SYNTHROID) 50 mcg tablet TAKE 1 TABLET BY MOUTH EVERY DAY    dapagliflozin (FARXIGA) 5 mg tab tablet Take 5 mg by mouth daily. cyanocobalamin 1,000 mcg tablet Take 1,000 mcg by mouth daily. metFORMIN ER (GLUCOPHAGE XR) 500 mg tablet Take 1,000 mg by mouth two (2) times a day. HUMALOG KWIKPEN 100 unit/mL kwikpen 28 Units by SubCUTAneous route nightly. Sliding scale    Cholecalciferol, Vitamin D3, (VITAMIN D3) 1,000 unit cap Take 1 Cap by mouth nightly. Aspirin, Buffered 81 mg tab Take  by mouth daily. No current facility-administered medications for this visit.        Mykel Rosa MD08/17/22 There are other unrelated non-urgent complaints, but due to the busy schedule and the amount of time I've already spent with her, time does not permit me to address these routine issues at today's visit. I've requested another appointment to review these additional issues.     [address] [facility] [place]  [address]  [address]

## 2022-08-17 NOTE — PATIENT INSTRUCTIONS
Have fasting labs obtained. Our office will call you with results.       Our office will obtain recent lab results from endocrinologist.    Schedule follow up with Dr. Nancy Rodriguez in 1 year with echocardiogram (same day)

## 2022-08-17 NOTE — PROGRESS NOTES
Identified pt with two pt identifiers(name and ). Reviewed record in preparation for visit and have obtained necessary documentation. Chief Complaint   Patient presents with    Heart Problem    Follow-up      Vitals:    22 1105   BP: 112/68   Pulse: 75   Resp: 18   Temp: 98 °F (36.7 °C)   TempSrc: Oral   SpO2: 95%   Weight: 188 lb 9.6 oz (85.5 kg)   Height: 5' 2\" (1.575 m)   PainSc:   0 - No pain       Allergies   Allergen Reactions    Adhesive Rash and Itching    Advil [Ibuprofen] Swelling     FEET SWELL    Ceftin [Cefuroxime Axetil] Itching    Lotensin [Benazepril] Cough    Penicillins Swelling    Sulfa (Sulfonamide Antibiotics) Rash, Itching and Swelling       Current Outpatient Medications   Medication Instructions    Aspirin, Buffered 81 mg tab DAILY    Cholecalciferol, Vitamin D3, (VITAMIN D3) 1,000 unit cap 1 Capsule, Oral, EVERY BEDTIME    cyanocobalamin 1,000 mcg, Oral, DAILY    dapagliflozin (FARXIGA) 5 mg, Oral, DAILY    dulaglutide (Trulicity) 3 BZ/0.1 mL pnij SubCUTAneous, EVERY 7 DAYS    finerenone (Kerendia) 20 mg tab 1 Tablet, Oral, DAILY, Dr. Krista Jose rxing    FreeStyle Radha 14 Day Sensor kit USE AS DIRECTED EVERY 14 DAYS    HumaLOG KwikPen Insulin 28 Units, SubCUTAneous, EVERY BEDTIME, Sliding scale    insulin degludec Adeola Echevaria FlexTouch U-100) 100 unit/mL (3 mL) inpn Taking 28 untis qhs Dr. Shelbie Tracy    levothyroxine (SYNTHROID) 50 mcg tablet TAKE 1 TABLET BY MOUTH EVERY DAY    loperamide (IMMODIUM) 2 mg tablet Take 2 tabs by mouth after the first loose stool of the day then take 1 tab after each bowel movement. Do not exceed 8 tablets total per day. losartan-hydroCHLOROthiazide (HYZAAR) 50-12.5 mg per tablet TAKE 1 TABLET BY MOUTH TWICE A DAY    magnesium oxide (MAG-OX) 400 mg tablet TAKE 1 TAB BY MOUTH TWO (2) TIMES A DAY.     meloxicam (MOBIC) 7.5 mg, Oral, DAILY    metFORMIN ER (GLUCOPHAGE XR) 1,000 mg, Oral, 2 TIMES DAILY    metoprolol tartrate (LOPRESSOR) 25 mg tablet TAKE 1 TABLET BY MOUTH TWICE A DAY    Mounjaro 5 mg, SubCUTAneous    omega-3 acid ethyl esters (LOVAZA) 4 g, DAILY    ondansetron (ZOFRAN ODT) 4 mg, Oral, EVERY 8 HOURS AS NEEDED    rosuvastatin (CRESTOR) 20 mg tablet TAKE 1 TABLET BY MOUTH EVERYDAY AT BEDTIME    sertraline (ZOLOFT) 100 mg tablet TAKE 1 TABLET BY MOUTH EVERY DAY IN THE EVENING       Health Maintenance Review: Patient reminded of \"due or due soon\" health maintenance. I have asked the patient to contact his/her primary care provider (PCP) for follow-up on his/her health maintenance. Immunization History   Administered Date(s) Administered     Influenza, FLUZONE (age 72 y+), High Dose 09/02/2021    COVID-19, MODERNA BLUE border, Primary or Immunocompromised, (age 18y+), IM, 100 mcg/0.5mL 02/13/2021, 03/13/2021, 10/29/2021    Influenza High Dose Vaccine PF 09/22/2015, 10/01/2016, 08/18/2017, 11/15/2018, 08/29/2019, 08/30/2020    Influenza, AFLURIA, FLUZONE (age 11-32 mo), IM, MDV, 0.25 mL, AFLURIA, FLUZONE (age2 y+) IM, MDV, 0.5mL 09/22/2021    Pneumococcal Conjugate (PCV-13) 10/17/2016    Pneumococcal Polysaccharide (PPSV-23) 11/30/2010, 05/20/2019    Tdap 01/15/2015    Zoster Recombinant 12/17/2019, 12/14/2021    Zoster Vaccine, Live 07/27/2011           Coordination of Care Questionnaire:  :   1) Have you been to an emergency room, urgent care, or hospitalized since your last visit? If yes, where when, and reason for visit? no       2. Have seen or consulted any other health care provider since your last visit? If yes, where when, and reason for visit? NO      Patient is accompanied by self I have received verbal consent from Jennifer Freed to discuss any/all medical information while they are present in the room.

## 2022-08-23 ENCOUNTER — HOSPITAL ENCOUNTER (OUTPATIENT)
Dept: MAMMOGRAPHY | Age: 77
Discharge: HOME | End: 2022-08-23
Attending: INTERNAL MEDICINE
Payer: MEDICARE

## 2022-08-23 DIAGNOSIS — Z12.31 ENCOUNTER FOR SCREENING MAMMOGRAM FOR MALIGNANT NEOPLASM OF BREAST: ICD-10-CM

## 2022-08-23 PROCEDURE — 77063 BREAST TOMOSYNTHESIS BI: CPT

## 2022-09-22 ENCOUNTER — OFFICE VISIT (OUTPATIENT)
Dept: INTERNAL MEDICINE CLINIC | Age: 77
End: 2022-09-22
Payer: MEDICARE

## 2022-09-22 VITALS
BODY MASS INDEX: 34.67 KG/M2 | DIASTOLIC BLOOD PRESSURE: 57 MMHG | SYSTOLIC BLOOD PRESSURE: 106 MMHG | TEMPERATURE: 97.4 F | RESPIRATION RATE: 14 BRPM | HEIGHT: 62 IN | HEART RATE: 62 BPM | OXYGEN SATURATION: 97 % | WEIGHT: 188.4 LBS

## 2022-09-22 DIAGNOSIS — H26.9 CATARACT OF BOTH EYES, UNSPECIFIED CATARACT TYPE: Primary | ICD-10-CM

## 2022-09-22 PROCEDURE — G8536 NO DOC ELDER MAL SCRN: HCPCS | Performed by: INTERNAL MEDICINE

## 2022-09-22 PROCEDURE — G8417 CALC BMI ABV UP PARAM F/U: HCPCS | Performed by: INTERNAL MEDICINE

## 2022-09-22 PROCEDURE — 99214 OFFICE O/P EST MOD 30 MIN: CPT | Performed by: INTERNAL MEDICINE

## 2022-09-22 PROCEDURE — G8752 SYS BP LESS 140: HCPCS | Performed by: INTERNAL MEDICINE

## 2022-09-22 PROCEDURE — 1101F PT FALLS ASSESS-DOCD LE1/YR: CPT | Performed by: INTERNAL MEDICINE

## 2022-09-22 PROCEDURE — G0463 HOSPITAL OUTPT CLINIC VISIT: HCPCS | Performed by: INTERNAL MEDICINE

## 2022-09-22 PROCEDURE — G8399 PT W/DXA RESULTS DOCUMENT: HCPCS | Performed by: INTERNAL MEDICINE

## 2022-09-22 PROCEDURE — G8510 SCR DEP NEG, NO PLAN REQD: HCPCS | Performed by: INTERNAL MEDICINE

## 2022-09-22 PROCEDURE — G8427 DOCREV CUR MEDS BY ELIG CLIN: HCPCS | Performed by: INTERNAL MEDICINE

## 2022-09-22 PROCEDURE — 1090F PRES/ABSN URINE INCON ASSESS: CPT | Performed by: INTERNAL MEDICINE

## 2022-09-22 PROCEDURE — G8754 DIAS BP LESS 90: HCPCS | Performed by: INTERNAL MEDICINE

## 2022-09-22 NOTE — PROGRESS NOTES
Preoperative Evaluation    Date of Exam: 2022    Estefania Pendleton is a 68 y.o. female (:1945) who presents for preoperative evaluation. Procedure/Surgery:cataract  Date of Procedure/Surgery: October 3, 2022 and 2022  Surgeon: Dr. Janiya Canales  [facility]:  [facility]  Primary Physician: Teri Melvin MD  Latex Allergy: no    Patient presents for preop evaluation for bilateral cataract surgery. Diabetes  She has been followed by endocrinology. She is not having any side effects or hypoglycemia on her insulin. She is doing physical therapy for her shoulder pain however is getting some light exercise on the treadmill. She is not having any hypoglycemia. She is on multiple diabetes medications and is not having any side effects. Hypertension  She does not have any chest pain or shortness of breath. She is tolerating the losartan hydrochlorothiazide twice a day and no lightheaded or dizziness. Coronary artery disease  Follows with cardiology. She had an EKG several months ago and no abnormalities. She has not had any changes in her cardiac medications. Problem List:     Patient Active Problem List    Diagnosis Date Noted    Chronic renal disease, stage III 2022    Incontinence without sensory awareness 2020    Mixed hyperlipidemia 2020    Urge incontinence of urine 02/10/2020    Female stress incontinence 02/10/2020    Type 2 diabetes with nephropathy ([address] 2018    Severe obesity (BMI 35.0-39. 9) with comorbidity ([address] 2018    Advanced care planning/counseling discussion 2017    CAD (coronary artery disease)     Diabetes ([address]     Hypertension     Arthritis     Heart disease      Medical History:     Past Medical History:   Diagnosis Date    Arthritis     CAD (coronary artery disease)     STENT PLACED>PLAQUE BROKE OFF--HAD \"MINOR MI\"    Deviated septum 13    HAS TROUBLE BREATHING THROUGH LEFT SIDE; CT SHOWED POLYP    Diabetes ([facility] [address]     Heart disease     Hypertension     Incontinence     Nasal polyp     Unspecified sleep apnea 12/20/2013    biphasic /bipap     Allergies: Allergies   Allergen Reactions    Adhesive Rash and Itching    Advil [Ibuprofen] Swelling     FEET SWELL    Ceftin [Cefuroxime Axetil] Itching    Lotensin [Benazepril] Cough    Penicillins Swelling    Sulfa (Sulfonamide Antibiotics) Rash, Itching and Swelling      Medications:     Current Outpatient Medications   Medication Sig    FreeStyle Radha 14 Day Sensor kit USE AS DIRECTED EVERY 14 DAYS    tirzepatide (Mounjaro) 10 mg/0.5 mL pnij 5 mg by SubCUTAneous route. sertraline (ZOLOFT) 100 mg tablet TAKE 1 TABLET BY MOUTH EVERY DAY IN THE EVENING    losartan-hydroCHLOROthiazide (HYZAAR) 50-12.5 mg per tablet TAKE 1 TABLET BY MOUTH TWICE A DAY    rosuvastatin (CRESTOR) 20 mg tablet TAKE 1 TABLET BY MOUTH EVERYDAY AT BEDTIME    insulin degludec Louvenia Keena FlexTouch U-100) 100 unit/mL (3 mL) inpn Taking 28 untis qhs Dr. Sousa Sons    finerenone Viktor Stiven) 20 mg tab Take 1 Tablet by mouth daily. Dr. Candace Cadet rxing    ondansetron (ZOFRAN ODT) 4 mg disintegrating tablet TAKE 1 TAB BY MOUTH EVERY EIGHT (8) HOURS AS NEEDED FOR NAUSEA OR VOMITING. metoprolol tartrate (LOPRESSOR) 25 mg tablet TAKE 1 TABLET BY MOUTH TWICE A DAY    levothyroxine (SYNTHROID) 50 mcg tablet TAKE 1 TABLET BY MOUTH EVERY DAY    dapagliflozin (FARXIGA) 5 mg tab tablet Take 5 mg by mouth daily. cyanocobalamin 1,000 mcg tablet Take 1,000 mcg by mouth daily. metFORMIN ER (GLUCOPHAGE XR) 500 mg tablet Take 1,000 mg by mouth two (2) times a day. HUMALOG KWIKPEN 100 unit/mL kwikpen 28 Units by SubCUTAneous route nightly. Sliding scale    Cholecalciferol, Vitamin D3, (VITAMIN D3) 1,000 unit cap Take 1 Cap by mouth nightly. Aspirin, Buffered 81 mg tab Take  by mouth daily. No current facility-administered medications for this visit.      Surgical History:     Past Surgical History:   Procedure Laterality Date    HX  SECTION      X3    HX COLONOSCOPY  2011    HX HEART CATHETERIZATION  2004    HX HEENT  ?     SEPTOPLASTY    MT CARDIAC SURG PROCEDURE UNLIST  2006    CARDIAC CATH;ANGIOPLASTY WITH STENT     Social History:     Social History     Socioeconomic History    Marital status:    Tobacco Use    Smoking status: Former     Packs/day: 1.00     Years: 5.00     Pack years: 5.00     Types: Cigarettes     Quit date: 1970     Years since quittin.7    Smokeless tobacco: Never   Substance and Sexual Activity    Alcohol use: Not Currently    Drug use: No    Sexual activity: Not Currently     Partners: Male     Birth control/protection: None   Social History Narrative            3 children women 36, 40, 40 healthy other than skin issues       Recent use of: No recent use of aspirin (ASA), NSAIDS or steroids    Tetanus up to date: last tetanus booster within 10 years      Anesthesia Complications: None  History of abnormal bleeding : None  History of Blood Transfusions: no  Health Care Directive or Living Will: yes    REVIEW OF SYSTEMS:  Constitutional: negative for fevers, chills, sweats, fatigue, malaise, anorexia, and weight loss  Eyes: negative for contacts/glasses, irritation, and redness  Respiratory: negative for cough, sputum, hemoptysis, asthma, wheezing, dyspnea on exertion, or emphysema  Cardiovascular: negative for chest pressure/discomfort, dyspnea  Gastrointestinal: negative for dysphagia, odynophagia, dyspepsia, reflux symptoms, nausea, vomiting, and change in bowel habits  Musculoskeletal:positive for back pain and right lower leg pain, negative for myalgias and neck pain  Neurological: negative for headaches, dizziness, and vertigo    EXAM:   Visit Vitals  BP (!) 106/57 (BP 1 Location: Left upper arm, BP Patient Position: Sitting, BP Cuff Size: Small adult)   Pulse 62   Temp 97.4 °F (36.3 °C) (Oral)   Resp 14   Ht 5' 2\" (1.575 m)   Wt 188 lb 6.4 oz (85.5 kg)   SpO2 97%   BMI 34.46 kg/m²     General appearance - alert, well appearing, and in no distress  Mental status - alert, oriented to person, place, and time  Eyes - pupils equal and reactive, extraocular eye movements intact  Nose - normal and patent, no erythema, discharge or polyps  Mouth - mucous membranes moist, pharynx normal without lesions  Neck - supple, no significant adenopathy  Lymphatics - no palpable lymphadenopathy, no hepatosplenomegaly  Chest - clear to auscultation, no wheezes, rales or rhonchi, symmetric air entry  Heart - normal rate, regular rhythm, normal S1, S2, no murmurs, rubs, clicks or gallops  Abdomen - soft, nontender, nondistended, no masses or organomegaly  Back exam - full range of motion, no tenderness, palpable spasm or pain on motion  Neurological - alert, oriented, normal speech, no focal findings or movement disorder noted  Musculoskeletal - no joint tenderness, deformity or swelling  Extremities - peripheral pulses normal, no pedal edema, no clubbing or cyanosis  Skin - normal coloration and turgor, no rashes, no suspicious skin lesions noted      DIAGNOSTICS:   3. Labs:   Labs reviewed from endocrinology in July 2022  She has mild renal insufficiency  Hemoglobin A1c 7.5      IMPRESSION:   Patient is a 57-year-old woman with history of coronary artery disease status post stent several years ago and no angina, diabetes overall controlled and mild renal dysfunction. She is scheduled for a low risk procedure and does not have any cardiac issues. She can continue her medications and proceed with surgery. Hypertension  Well-controlled    Diabetes  Hemoglobin A1c 7.5  She is stable on Tresiba, metformin, Farxiga and now [facility]  Follow-up with endocrinology in January    Patient did voice concerns about some low back pain and right leg pain. I evaluated and reviewed with her.   Emphasis on visit today was for her preop she is in physical therapy and will continue monitoring and get therapy if needed.           Mo Baez MD   9/22/2022

## 2022-09-25 DIAGNOSIS — I10 ESSENTIAL HYPERTENSION: ICD-10-CM

## 2022-09-26 RX ORDER — METOPROLOL TARTRATE 25 MG/1
TABLET, FILM COATED ORAL
Qty: 180 TABLET | Refills: 3 | Status: SHIPPED | OUTPATIENT
Start: 2022-09-26

## 2022-09-27 ENCOUNTER — PATIENT MESSAGE (OUTPATIENT)
Dept: CARDIOLOGY CLINIC | Age: 77
End: 2022-09-27

## 2022-10-21 DIAGNOSIS — I10 ESSENTIAL HYPERTENSION: ICD-10-CM

## 2022-10-21 RX ORDER — LOSARTAN POTASSIUM AND HYDROCHLOROTHIAZIDE 12.5; 5 MG/1; MG/1
TABLET ORAL
Qty: 180 TABLET | Refills: 0 | Status: SHIPPED | OUTPATIENT
Start: 2022-10-21

## 2022-11-03 RX ORDER — SERTRALINE HYDROCHLORIDE 100 MG/1
TABLET, FILM COATED ORAL
Qty: 90 TABLET | Refills: 0 | Status: SHIPPED | OUTPATIENT
Start: 2022-11-03 | End: 2022-11-07

## 2022-11-07 RX ORDER — ROSUVASTATIN CALCIUM 20 MG/1
TABLET, COATED ORAL
Qty: 90 TABLET | Refills: 1 | Status: SHIPPED | OUTPATIENT
Start: 2022-11-07

## 2022-11-18 ENCOUNTER — OFFICE VISIT (OUTPATIENT)
Dept: INTERNAL MEDICINE CLINIC | Age: 77
End: 2022-11-18
Payer: MEDICARE

## 2022-11-18 VITALS
TEMPERATURE: 97.6 F | WEIGHT: 183.5 LBS | BODY MASS INDEX: 33.77 KG/M2 | HEIGHT: 62 IN | OXYGEN SATURATION: 90 % | RESPIRATION RATE: 16 BRPM | DIASTOLIC BLOOD PRESSURE: 66 MMHG | SYSTOLIC BLOOD PRESSURE: 98 MMHG | HEART RATE: 67 BPM

## 2022-11-18 DIAGNOSIS — E11.21 TYPE 2 DIABETES WITH NEPHROPATHY (HCC): ICD-10-CM

## 2022-11-18 DIAGNOSIS — M76.31 IT BAND SYNDROME, RIGHT: Primary | ICD-10-CM

## 2022-11-18 DIAGNOSIS — G47.33 OBSTRUCTIVE SLEEP APNEA SYNDROME: ICD-10-CM

## 2022-11-18 DIAGNOSIS — I25.10 CORONARY ARTERY DISEASE WITHOUT ANGINA PECTORIS, UNSPECIFIED VESSEL OR LESION TYPE, UNSPECIFIED WHETHER NATIVE OR TRANSPLANTED HEART: ICD-10-CM

## 2022-11-18 DIAGNOSIS — M25.511 CHRONIC RIGHT SHOULDER PAIN: ICD-10-CM

## 2022-11-18 DIAGNOSIS — I10 ESSENTIAL HYPERTENSION: ICD-10-CM

## 2022-11-18 DIAGNOSIS — G89.29 CHRONIC RIGHT SHOULDER PAIN: ICD-10-CM

## 2022-11-18 PROCEDURE — 1101F PT FALLS ASSESS-DOCD LE1/YR: CPT | Performed by: INTERNAL MEDICINE

## 2022-11-18 PROCEDURE — G8510 SCR DEP NEG, NO PLAN REQD: HCPCS | Performed by: INTERNAL MEDICINE

## 2022-11-18 PROCEDURE — 99214 OFFICE O/P EST MOD 30 MIN: CPT | Performed by: INTERNAL MEDICINE

## 2022-11-18 PROCEDURE — G8417 CALC BMI ABV UP PARAM F/U: HCPCS | Performed by: INTERNAL MEDICINE

## 2022-11-18 PROCEDURE — G8754 DIAS BP LESS 90: HCPCS | Performed by: INTERNAL MEDICINE

## 2022-11-18 PROCEDURE — G8399 PT W/DXA RESULTS DOCUMENT: HCPCS | Performed by: INTERNAL MEDICINE

## 2022-11-18 PROCEDURE — G8536 NO DOC ELDER MAL SCRN: HCPCS | Performed by: INTERNAL MEDICINE

## 2022-11-18 PROCEDURE — G8752 SYS BP LESS 140: HCPCS | Performed by: INTERNAL MEDICINE

## 2022-11-18 PROCEDURE — G0463 HOSPITAL OUTPT CLINIC VISIT: HCPCS | Performed by: INTERNAL MEDICINE

## 2022-11-18 PROCEDURE — 1090F PRES/ABSN URINE INCON ASSESS: CPT | Performed by: INTERNAL MEDICINE

## 2022-11-18 PROCEDURE — G8427 DOCREV CUR MEDS BY ELIG CLIN: HCPCS | Performed by: INTERNAL MEDICINE

## 2022-11-18 NOTE — PROGRESS NOTES
Diagnoses and all orders for this visit:    1. It band syndrome, right  Patient has been working on treadmill  Appears to have iliotibial band syndrome  Will work with physical therapy-     [address] [place] [address],[address]    2. Essential hypertension  Patient's blood pressure very well controlled   She denies lightheaded or dizziness  Discussed with patient and  regarding blood pressure in the evening at night  Check blood pressure at night to ensure adequate pressure during sleep. They will discuss with cardiology as well    3. Type 2 diabetes with nephropathy (HCC)  Dr. Yaquelin Perdomo is treating along with patient's metabolic issues     4. Coronary artery disease without angina pectoris, unspecified vessel or lesion type, unspecified whether native or transplanted heart  Continue statin given atherosclerosis and diabetes LDL goal less than 70    5. Chronic right shoulder pain  Followed by orthopedics  Not controlled  Will take Tylenol 500 mg x 2 consistently every night and monitor symptoms    6. Obstructive sleep apnea syndrome  Currently using BiPAP but difficult with her shoulder pain            Discussed with patient and . She is being followed by multiple specialists and they are covering her overall care with regards to her diabetes, atherosclerosis, sleep apnea, orthopedics    Given her specialist she can follow-up with me every 6 months or annually. Her specialists are monitoring her very closely. Follow-up for her Medicare wellness visit. Follow-up with specialists: Endocrinology, cardiology  Follow-up in 6 months or earlier if needed      Chief Complaint   Patient presents with    Medication Evaluation     Right shoulder pain  She has been going to physical therapy for the past 2 months. Her  notes she has been off and on with this. She was seen by her orthopedic Dr. Melba Espitia and no intervention right now. He is continuing with conservative care and holding off on MRI.   She finds it difficult to sleep at night. She has been taking Tylenol intermittently but not consistently. She has tried diclofenac gel as well. Pain is significant enough to aggravate her sleep. She was given a new pillow to try to offset her shoulder pain    Right iliotibial band  She notes right leg pain. She rubs her knee when she is sitting. She has no difficulty going upstairs or downstairs. It only occurs primarily with sitting. She has been on the treadmill. Sometimes occurs when sitting to standing.     Diabetes  Followed very closely by Dr. Rhiannon Terrazas at 5 mg but cannot seem to advance due to significant nausea with medication  She is [facility] the [facility] and sees a   No side effects on her medications. Her A1c has been improved  She continues on insulin, mounjaro, metformin, Pink Carlos      Subjective:   Rossana Campos is a 68 y.o. female with hypertension. Hypertension ROS: taking medications as instructed, no medication side effects noted, no TIA's, no chest pain on exertion, no dyspnea on exertion, no swelling of ankles. New concerns: None. She denies lightheaded or dizziness when walking or when she gets up at night to go to the bathroom      SUBJECTIVE: Rossana Campos is a 68 y.o. female here for follow up of hypothyroidism. Lab Results   Component Value Date/Time    TSH 1.94 01/05/2021 11:50 AM     Thyroid ROS: denies fatigue, weight changes, heat/cold intolerance, bowel/skin changes or CVS symptoms. Memory impairment  No complaints at this visit.           Past Medical History:   Diagnosis Date    Arthritis     CAD (coronary artery disease) 2006    STENT PLACED>PLAQUE BROKE OFF--HAD \"MINOR MI\"    Deviated septum 12/20/13    HAS TROUBLE BREATHING THROUGH LEFT SIDE; CT SHOWED POLYP    Diabetes ([facility] [address]     Heart disease     Hypertension     Incontinence     Nasal polyp     Unspecified sleep apnea 12/20/2013    biphasic /bipap     Past Surgical History: Procedure Laterality Date    HX CATARACT REMOVAL      HX  SECTION      X3    HX COLONOSCOPY  2011    HX HEART CATHETERIZATION  2004    HX HEENT  ? SEPTOPLASTY    [place] CARDIAC SURG PROCEDURE UNLIST  2006    CARDIAC CATH;ANGIOPLASTY WITH STENT     Social History     Socioeconomic History    Marital status:    Tobacco Use    Smoking status: Former     Packs/day: 1.00     Years: 5.00     Pack years: 5.00     Types: Cigarettes     Quit date: 1970     Years since quittin.9    Smokeless tobacco: Never   Substance and Sexual Activity    Alcohol use: Not Currently    Drug use: No    Sexual activity: Not Currently     Partners: Male     Birth control/protection: None   Social History Narrative            3 children women 36, 40, 40 healthy other than skin issues     Family History   Problem Relation Age of Onset    Heart Disease Mother     Heart Surgery Mother         VALVE REPLACED AT AGE 72    Hypertension Mother     Hypertension Sister     Diabetes Maternal Aunt     Breast Cancer Paternal Aunt     Heart Attack Father 46        MASSIVE     Hypertension Father     Hypertension Brother     Anesth Problems Neg Hx      Current Outpatient Medications   Medication Sig Dispense Refill    rosuvastatin (CRESTOR) 20 mg tablet TAKE 1 TABLET BY MOUTH EVERYDAY AT BEDTIME 90 Tablet 1    sertraline (ZOLOFT) 100 mg tablet TAKE 1 TABLET BY MOUTH EVERY DAY IN THE EVENING 90 Tablet 1    losartan-hydroCHLOROthiazide (HYZAAR) 50-12.5 mg per tablet TAKE 1 TABLET BY MOUTH TWICE A  Tablet 1    metoprolol tartrate (LOPRESSOR) 25 mg tablet TAKE 1 TABLET BY MOUTH TWICE A  Tablet 3    FreeStyle Radha 14 Day Sensor kit USE AS DIRECTED EVERY 14 DAYS      tirzepatide (Mounjaro) 10 mg/0.5 mL pnij 5 mg by SubCUTAneous route.       insulin degludec Manual Robe FlexTouch U-100) 100 unit/mL (3 mL) inpn Taking 28 untis qhs Dr. Axel Colunga 3 Pen 0    finerenone Kat Galarza) 20 mg tab Take 1 Tablet by mouth daily. Dr. Yuliana Navarro rxing 30 Tablet 1    ondansetron (ZOFRAN ODT) 4 mg disintegrating tablet TAKE 1 TAB BY MOUTH EVERY EIGHT (8) HOURS AS NEEDED FOR NAUSEA OR VOMITING. 20 Tablet 1    levothyroxine (SYNTHROID) 50 mcg tablet TAKE 1 TABLET BY MOUTH EVERY DAY 90 Tablet 2    dapagliflozin (FARXIGA) 5 mg tab tablet Take 5 mg by mouth daily. cyanocobalamin 1,000 mcg tablet Take 1,000 mcg by mouth daily. metFORMIN ER (GLUCOPHAGE XR) 500 mg tablet Take 1,000 mg by mouth two (2) times a day. 3    HUMALOG KWIKPEN 100 unit/mL kwikpen 28 Units by SubCUTAneous route nightly. Sliding scale  3    Cholecalciferol, Vitamin D3, (VITAMIN D3) 1,000 unit cap Take 1 Cap by mouth nightly. 30 Cap 3    Aspirin, Buffered 81 mg tab Take  by mouth daily.        Allergies   Allergen Reactions    Adhesive Rash and Itching    Advil [Ibuprofen] Swelling     FEET SWELL    Ceftin [Cefuroxime Axetil] Itching    Lotensin [Benazepril] Cough    Penicillins Swelling    Sulfa (Sulfonamide Antibiotics) Rash, Itching and Swelling       Review of Systems - General ROS: negative for - chills or fever  Cardiovascular ROS: no chest pain or dyspnea on exertion  Respiratory ROS: no cough, shortness of breath, or wheezing    Visit Vitals  BP 98/66 (BP 1 Location: Left upper arm, BP Patient Position: Sitting, BP Cuff Size: Adult)   Pulse 67   Temp 97.6 °F (36.4 °C) (Oral)   Resp 16   Ht 5' 2\" (1.575 m)   Wt 183 lb 8 oz (83.2 kg)   SpO2 90%   BMI 33.56 kg/m²     Constitutional: [x] Appears well-developed and well-nourished [x] No apparent distress      [] Abnormal -     Mental status: [x] Alert and awake  [x] Oriented to person/place/time [x] Able to follow commands    [] Abnormal -     Eyes:   EOM    [x]  Normal    [] Abnormal -   Sclera  [x]  Normal    [] Abnormal -          Discharge [x]  None visible   [] Abnormal -     HENT: [x] Normocephalic, atraumatic  [] Abnormal -   [x] Mouth/Throat: Mucous membranes are moist    External Ears [x] Normal  [] Abnormal -    Neck: [x] No visualized mass [] Abnormal -     Pulmonary/Chest: [x] Respiratory effort normal   [x] No visualized signs of difficulty breathing or respiratory distress        [] Abnormal -      Musculoskeletal:   [x] Normal gait with no signs of ataxia         [x] Normal range of motion of neck        [] Abnormal -     Neurological:        [x] No Facial Asymmetry (Cranial nerve 7 motor function) (limited exam due to video visit)          [x] No gaze palsy        [] Abnormal -          Skin:        [x] No significant exanthematous lesions or discoloration noted on facial skin         [] Abnormal -            Psychiatric:       [x] Normal Affect [] Abnormal -        [x] No Hallucinations

## 2023-01-13 DIAGNOSIS — M25.511 RIGHT SHOULDER PAIN, UNSPECIFIED CHRONICITY: Primary | ICD-10-CM

## 2023-01-23 ENCOUNTER — OFFICE VISIT (OUTPATIENT)
Dept: ORTHOPEDIC SURGERY | Age: 78
End: 2023-01-23
Payer: MEDICARE

## 2023-01-23 VITALS — HEIGHT: 62 IN | BODY MASS INDEX: 33.13 KG/M2 | WEIGHT: 180 LBS

## 2023-01-23 DIAGNOSIS — M25.511 ACUTE PAIN OF RIGHT SHOULDER: Primary | ICD-10-CM

## 2023-01-23 DIAGNOSIS — Z79.4 CONTROLLED TYPE 2 DIABETES MELLITUS WITHOUT COMPLICATION, WITH LONG-TERM CURRENT USE OF INSULIN (HCC): ICD-10-CM

## 2023-01-23 DIAGNOSIS — E11.9 CONTROLLED TYPE 2 DIABETES MELLITUS WITHOUT COMPLICATION, WITH LONG-TERM CURRENT USE OF INSULIN (HCC): ICD-10-CM

## 2023-01-23 DIAGNOSIS — M19.011 PRIMARY OSTEOARTHRITIS OF RIGHT SHOULDER: ICD-10-CM

## 2023-01-23 PROBLEM — E11.22 TYPE 2 DIABETES MELLITUS WITH CHRONIC KIDNEY DISEASE (HCC): Status: ACTIVE | Noted: 2023-01-23

## 2023-01-23 PROCEDURE — G8427 DOCREV CUR MEDS BY ELIG CLIN: HCPCS | Performed by: ORTHOPAEDIC SURGERY

## 2023-01-23 PROCEDURE — 99203 OFFICE O/P NEW LOW 30 MIN: CPT | Performed by: ORTHOPAEDIC SURGERY

## 2023-01-23 PROCEDURE — 1101F PT FALLS ASSESS-DOCD LE1/YR: CPT | Performed by: ORTHOPAEDIC SURGERY

## 2023-01-23 PROCEDURE — 1090F PRES/ABSN URINE INCON ASSESS: CPT | Performed by: ORTHOPAEDIC SURGERY

## 2023-01-23 PROCEDURE — 1123F ACP DISCUSS/DSCN MKR DOCD: CPT | Performed by: ORTHOPAEDIC SURGERY

## 2023-01-23 PROCEDURE — G8417 CALC BMI ABV UP PARAM F/U: HCPCS | Performed by: ORTHOPAEDIC SURGERY

## 2023-01-23 PROCEDURE — G8399 PT W/DXA RESULTS DOCUMENT: HCPCS | Performed by: ORTHOPAEDIC SURGERY

## 2023-01-23 PROCEDURE — G8536 NO DOC ELDER MAL SCRN: HCPCS | Performed by: ORTHOPAEDIC SURGERY

## 2023-01-23 PROCEDURE — G8432 DEP SCR NOT DOC, RNG: HCPCS | Performed by: ORTHOPAEDIC SURGERY

## 2023-01-23 NOTE — LETTER
1/23/2023    Patient: Emanuel Wang   YOB: 1945   Date of Visit: 1/23/2023     Damion Dodson, [address] [facility]  Via In Basket    Dear Damion Dodson MD,      Thank you for referring Ms. Eben Ames to [facility] for evaluation. My notes for this consultation are attached. If you have questions, please do not hesitate to call me. I look forward to following your patient along with you.       Sincerely,    Willem Renteria MD

## 2023-01-23 NOTE — PROGRESS NOTES
Lexie Pena (: 1945) is a 68 y.o. female, patient, here for evaluation of the following chief complaint(s):  Shoulder Pain (Right shoulder )       HPI:    49-year-old female presents with chief complaint of right shoulder pain. She states that past 5 months she has had progressive increasing pain and decreased range of motion in her right shoulder. Patient is a diabetic and has a A1c of 7.4 and is on insulin. She has already seen another orthopedist for this issue who is prescribed her physical therapy and given her cortisone injection. She is done 3 months of physical therapy and has been seeing improvement. She would like to continue physical therapy for her adhesive capsulitis. She is here today for another opinion to make sure that therapy is working. Allergies   Allergen Reactions    Adhesive Rash and Itching    Advil [Ibuprofen] Swelling     FEET SWELL    Ceftin [Cefuroxime Axetil] Itching    Lotensin [Benazepril] Cough    Penicillins Swelling    Sulfa (Sulfonamide Antibiotics) Rash, Itching and Swelling       Current Outpatient Medications   Medication Sig    rosuvastatin (CRESTOR) 20 mg tablet TAKE 1 TABLET BY MOUTH EVERYDAY AT BEDTIME    sertraline (ZOLOFT) 100 mg tablet TAKE 1 TABLET BY MOUTH EVERY DAY IN THE EVENING    losartan-hydroCHLOROthiazide (HYZAAR) 50-12.5 mg per tablet TAKE 1 TABLET BY MOUTH TWICE A DAY    metoprolol tartrate (LOPRESSOR) 25 mg tablet TAKE 1 TABLET BY MOUTH TWICE A DAY    FreeStyle Radha 14 Day Sensor kit USE AS DIRECTED EVERY 14 DAYS    tirzepatide (Mounjaro) 10 mg/0.5 mL pnij 5 mg by SubCUTAneous route. insulin degludec Jerome Harris FlexTouch U-100) 100 unit/mL (3 mL) inpn Taking 28 untis qhs Dr. Johnathon Paz) 20 mg tab Take 1 Tablet by mouth daily. Dr. Matthew Watts rxing    ondansetron (ZOFRAN ODT) 4 mg disintegrating tablet TAKE 1 TAB BY MOUTH EVERY EIGHT (8) HOURS AS NEEDED FOR NAUSEA OR VOMITING.     levothyroxine (SYNTHROID) 50 mcg tablet TAKE 1 TABLET BY MOUTH EVERY DAY    dapagliflozin (FARXIGA) 5 mg tab tablet Take 5 mg by mouth daily. cyanocobalamin 1,000 mcg tablet Take 1,000 mcg by mouth daily. metFORMIN ER (GLUCOPHAGE XR) 500 mg tablet Take 1,000 mg by mouth two (2) times a day. HUMALOG KWIKPEN 100 unit/mL kwikpen 28 Units by SubCUTAneous route nightly. Sliding scale    Cholecalciferol, Vitamin D3, (VITAMIN D3) 1,000 unit cap Take 1 Cap by mouth nightly. Aspirin, Buffered 81 mg tab Take  by mouth daily. No current facility-administered medications for this visit. Past Medical History:   Diagnosis Date    Arthritis     CAD (coronary artery disease)     STENT PLACED>PLAQUE BROKE OFF--HAD \"MINOR MI\"    Deviated septum 13    HAS TROUBLE BREATHING THROUGH LEFT SIDE; CT SHOWED POLYP    Diabetes ([address]     Heart disease     Hypertension     Incontinence     Nasal polyp     Unspecified sleep apnea 2013    biphasic /bipap        Past Surgical History:   Procedure Laterality Date    HX CATARACT REMOVAL      HX  SECTION      X3    HX COLONOSCOPY  2011    HX HEART CATHETERIZATION      HX HEENT  ?     SEPTOPLASTY    [place] CARDIAC SURG PROCEDURE UNLIST  2006    CARDIAC CATH;ANGIOPLASTY WITH STENT       Family History   Problem Relation Age of Onset    Heart Disease Mother     Heart Surgery Mother         VALVE REPLACED AT AGE 72    Hypertension Mother     Hypertension Sister     Diabetes Maternal Aunt     Breast Cancer Paternal Aunt     Heart Attack Father 46        MASSIVE     Hypertension Father     Hypertension Brother     Anesth Problems Neg Hx         Social History     Socioeconomic History    Marital status:      Spouse name: Not on file    Number of children: Not on file    Years of education: Not on file    Highest education level: Not on file   Occupational History    Not on file   Tobacco Use    Smoking status: Former     Packs/day: 1.00     Years: 5.00     Pack years: 5.00 Types: Cigarettes     Quit date: 1970     Years since quittin.1    Smokeless tobacco: Never   Substance and Sexual Activity    Alcohol use: Not Currently    Drug use: No    Sexual activity: Not Currently     Partners: Male     Birth control/protection: None   Other Topics Concern     Service Not Asked    Blood Transfusions Not Asked    Caffeine Concern Not Asked    Occupational Exposure Not Asked    Hobby Hazards Not Asked    Sleep Concern Not Asked    Stress Concern Not Asked    Weight Concern Not Asked    Special Diet Not Asked    Back Care Not Asked    Exercise Not Asked    Bike Helmet Not Asked    Seat Belt Not Asked    Self-Exams Not Asked   Social History Narrative            3 children women 36, 40, 37 healthy other than skin issues     Social Determinants of Health     Financial Resource Strain: Not on file   Food Insecurity: Not on file   Transportation Needs: Not on file   Physical Activity: Not on file   Stress: Not on file   Social Connections: Not on file   Intimate Partner Violence: Not on file   Housing Stability: Not on file       Review of Systems   Musculoskeletal:         Right shoulder      Vitals:  Ht 5' 2\" (1.575 m)   Wt 180 lb (81.6 kg)   BMI 32.92 kg/m²    Body mass index is 32.92 kg/m². Ortho Exam     Patient is alert and oriented x3. Patient is in no acute distress. Patient ambulates with a nonantalgic gait. Right shoulder: No shoulder girdle atrophy. No swelling or erythema. Active and passive forward flexion of 140 degrees, lateral abduction of 80 degrees, and external rotation of 30 degrees with pain at the end of range of motion. Internal rotation to the lumbar spine. 5/5 strength with forward flexion, lateral abduction, internal rotation, and external rotation. Full cervical spine range of motion with negative Spurling sign. Neurovascular exam is normal.    Left shoulder:  No shoulder girdle atrophy .   There is no soft tissue swelling, ecchymosis, abrasions or lacerations. Active range of motion is full with forward flexion, lateral abduction and external rotation. Internal rotation is to the upper lumbar level with a negative lift-off sign. Passive range of motion is full with a negative impingement sign and a negative Suero sign. Rotator cuff strength with forward flexion, lateral abduction and external rotation is intact with 5/5 strength. There is no crepitation about the joint. Palpation of the [facility] joint does not reproduce discomfort, and there is no pain elicited with cross-body adduction. Strength of the extremity is 5/5 at biceps/triceps/wrist extension. DRT's are intact at +2/4 and  symmetrical.  Cervical range of motion is full with no pain to palpation along the paraspinal musculature medial border of the scapula. Spurling's sign is negative. XR Results (most recent):  Results from Appointment encounter on 01/23/23    XR SHOULDER RT AP/LAT MIN 2 V    Narrative  Three-view x-ray of the right shoulder reveals moderate osteoarthritis of the right shoulder with spur formation noted along the inferior aspect of the humeral head and slight narrowing of the joint       ASSESSMENT/PLAN:    The patient is a known diagnosis of right shoulder adhesive capsulitis which we agree with but she also has moderate osteoarthritis of her glenohumeral joint. She can continue physical therapy and we will write a prescription for that today. We also provided more education that she needs to keep her diabetes under control as this may be contributing to her adhesive capsulitis. We will write therapy prescription for 4 weeks and she will follow-up after that to track her progression. If she is not seeing significant improvement and is still having pain at her next follow-up we will consider a glenohumeral joint injection of cortisone.         Larissa Schrader MD

## 2023-03-02 ENCOUNTER — OFFICE VISIT (OUTPATIENT)
Dept: ORTHOPEDIC SURGERY | Age: 78
End: 2023-03-02

## 2023-03-02 DIAGNOSIS — M75.01 ADHESIVE CAPSULITIS OF RIGHT SHOULDER: ICD-10-CM

## 2023-03-02 DIAGNOSIS — Z79.4 CONTROLLED TYPE 2 DIABETES MELLITUS WITHOUT COMPLICATION, WITH LONG-TERM CURRENT USE OF INSULIN (HCC): ICD-10-CM

## 2023-03-02 DIAGNOSIS — M19.011 PRIMARY OSTEOARTHRITIS OF RIGHT SHOULDER: Primary | ICD-10-CM

## 2023-03-02 DIAGNOSIS — E11.9 CONTROLLED TYPE 2 DIABETES MELLITUS WITHOUT COMPLICATION, WITH LONG-TERM CURRENT USE OF INSULIN (HCC): ICD-10-CM

## 2023-03-02 RX ORDER — METHYLPREDNISOLONE ACETATE 80 MG/ML
80 INJECTION, SUSPENSION INTRA-ARTICULAR; INTRALESIONAL; INTRAMUSCULAR; SOFT TISSUE ONCE
Status: COMPLETED | OUTPATIENT
Start: 2023-03-02 | End: 2023-03-02

## 2023-03-02 RX ORDER — BUPIVACAINE HYDROCHLORIDE 2.5 MG/ML
5 INJECTION, SOLUTION INFILTRATION; PERINEURAL ONCE
Status: COMPLETED | OUTPATIENT
Start: 2023-03-02 | End: 2023-03-02

## 2023-03-02 RX ADMIN — BUPIVACAINE HYDROCHLORIDE 12.5 MG: 2.5 INJECTION, SOLUTION INFILTRATION; PERINEURAL at 15:30

## 2023-03-02 RX ADMIN — METHYLPREDNISOLONE ACETATE 80 MG: 80 INJECTION, SUSPENSION INTRA-ARTICULAR; INTRALESIONAL; INTRAMUSCULAR; SOFT TISSUE at 15:31

## 2023-03-02 NOTE — PROGRESS NOTES
Rubi York (: 1945) is a 68 y.o. female, patient, here for evaluation of the following chief complaint(s):  Shoulder Pain (Right shoulder )       HPI:    Patient presents to clinic today for follow-up evaluation of her right shoulder. She has a with standing diagnosis of glenohumeral osteoarthritis with superimposed adhesive capsulitis. She has completed her physical therapy regimen as instructed. She states her pain and range of motion have not improved since her last encounter. Allergies   Allergen Reactions    Adhesive Rash and Itching    Advil [Ibuprofen] Swelling     FEET SWELL    Ceftin [Cefuroxime Axetil] Itching    Lotensin [Benazepril] Cough    Penicillins Swelling    Sulfa (Sulfonamide Antibiotics) Rash, Itching and Swelling       Current Outpatient Medications   Medication Sig    rosuvastatin (CRESTOR) 20 mg tablet TAKE 1 TABLET BY MOUTH EVERYDAY AT BEDTIME    sertraline (ZOLOFT) 100 mg tablet TAKE 1 TABLET BY MOUTH EVERY DAY IN THE EVENING    losartan-hydroCHLOROthiazide (HYZAAR) 50-12.5 mg per tablet TAKE 1 TABLET BY MOUTH TWICE A DAY    metoprolol tartrate (LOPRESSOR) 25 mg tablet TAKE 1 TABLET BY MOUTH TWICE A DAY    FreeStyle Radha 14 Day Sensor kit USE AS DIRECTED EVERY 14 DAYS    tirzepatide (Mounjaro) 10 mg/0.5 mL pnij 5 mg by SubCUTAneous route. insulin degludec Gabrielia Gurmeet FlexTouch U-100) 100 unit/mL (3 mL) inpn Taking 28 untis qhs Dr. Twila Dooley) 20 mg tab Take 1 Tablet by mouth daily. Dr. Shanks Jiménez rxing    ondansetron (ZOFRAN ODT) 4 mg disintegrating tablet TAKE 1 TAB BY MOUTH EVERY EIGHT (8) HOURS AS NEEDED FOR NAUSEA OR VOMITING. levothyroxine (SYNTHROID) 50 mcg tablet TAKE 1 TABLET BY MOUTH EVERY DAY    dapagliflozin (FARXIGA) 5 mg tab tablet Take 5 mg by mouth daily. cyanocobalamin 1,000 mcg tablet Take 1,000 mcg by mouth daily. metFORMIN ER (GLUCOPHAGE XR) 500 mg tablet Take 1,000 mg by mouth two (2) times a day.     HUMALOG KWIKPEN 100 unit/mL kwikpen 28 Units by SubCUTAneous route nightly. Sliding scale    Cholecalciferol, Vitamin D3, (VITAMIN D3) 1,000 unit cap Take 1 Cap by mouth nightly. Aspirin, Buffered 81 mg tab Take  by mouth daily. No current facility-administered medications for this visit. Past Medical History:   Diagnosis Date    Arthritis     CAD (coronary artery disease)     STENT PLACED>PLAQUE BROKE OFF--HAD \"MINOR MI\"    Deviated septum 13    HAS TROUBLE BREATHING THROUGH LEFT SIDE; CT SHOWED POLYP    Diabetes ([address]     Heart disease     Hypertension     Incontinence     Nasal polyp     Unspecified sleep apnea 2013    biphasic /bipap        Past Surgical History:   Procedure Laterality Date    HX CATARACT REMOVAL      HX  SECTION      X3    HX COLONOSCOPY  2011    HX HEART CATHETERIZATION      HX HEENT  ?     SEPTOPLASTY    [place] UNLISTED PROCEDURE CARDIAC SURGERY  2006    CARDIAC CATH;ANGIOPLASTY WITH STENT       Family History   Problem Relation Age of Onset    Heart Disease Mother     Heart Surgery Mother         VALVE REPLACED AT AGE 72    Hypertension Mother     Hypertension Sister     Diabetes Maternal Aunt     Breast Cancer Paternal Aunt     Heart Attack Father 46        MASSIVE     Hypertension Father     Hypertension Brother     Anesth Problems Neg Hx         Social History     Socioeconomic History    Marital status:      Spouse name: Not on file    Number of children: Not on file    Years of education: Not on file    Highest education level: Not on file   Occupational History    Not on file   Tobacco Use    Smoking status: Former     Packs/day: 1.00     Years: 5.00     Pack years: 5.00     Types: Cigarettes     Quit date: 1970     Years since quittin.2    Smokeless tobacco: Never   Substance and Sexual Activity    Alcohol use: Not Currently    Drug use: No    Sexual activity: Not Currently     Partners: Male     Birth control/protection: None   Other Topics Concern     Service Not Asked    Blood Transfusions Not Asked    Caffeine Concern Not Asked    Occupational Exposure Not Asked    Hobby Hazards Not Asked    Sleep Concern Not Asked    Stress Concern Not Asked    Weight Concern Not Asked    Special Diet Not Asked    Back Care Not Asked    Exercise Not Asked    Bike Helmet Not Asked    Seat Belt Not Asked    Self-Exams Not Asked   Social History Narrative            3 children women 36, 40, 37 healthy other than skin issues     Social Determinants of Health     Financial Resource Strain: Not on file   Food Insecurity: Not on file   Transportation Needs: Not on file   Physical Activity: Not on file   Stress: Not on file   Social Connections: Not on file   Intimate Partner Violence: Not on file   Housing Stability: Not on file       Review of Systems   Musculoskeletal:         Right shoulder      Vitals: There were no vitals taken for this visit. There is no height or weight on file to calculate BMI. Ortho Exam     Patient is alert and oriented x3. Patient is in no acute distress. Patient ambulates with a nonantalgic gait. Right shoulder: No shoulder girdle atrophy. No swelling or erythema. Active and passive forward flexion of 140 degrees, lateral abduction of 80 degrees, and external rotation of 30 degrees with pain at the end of range of motion. Internal rotation to the waistline. 5/5 strength with forward flexion, lateral abduction, internal rotation, and external rotation. Full cervical spine range of motion with negative Spurling sign. Neurovascular exam is normal.     Left shoulder:  No shoulder girdle atrophy . There is no soft tissue swelling, ecchymosis, abrasions or lacerations. Active range of motion is full with forward flexion, lateral abduction and external rotation. Internal rotation is to the upper lumbar level with a negative lift-off sign.   Passive range of motion is full with a negative impingement sign and a negative Suero sign. Rotator cuff strength with forward flexion, lateral abduction and external rotation is intact with 5/5 strength. There is no crepitation about the joint. Palpation of the [facility] joint does not reproduce discomfort, and there is no pain elicited with cross-body adduction. Strength of the extremity is 5/5 at biceps/triceps/wrist extension. DRT's are intact at +2/4 and  symmetrical.  Cervical range of motion is full with no pain to palpation along the paraspinal musculature medial border of the scapula. Spurling's sign is negative             XR Results (most recent):  Results from Appointment encounter on 01/23/23     XR SHOULDER RT AP/LAT MIN 2 V     Narrative  Three-view x-ray of the right shoulder reveals moderate osteoarthritis of the right shoulder with spur formation noted along the inferior aspect of the humeral head and slight narrowing of the joint. This x-ray was reviewed once again today. ASSESSMENT/PLAN:    We discussed our findings with the patient. She is unfortunately not been able to get much improvement in her pain and range of motion over the past couple months despite conservative measures thus far. We did discuss the possibility of obtaining an MRI versus corticosteroid therapy. We discussed the MRI would help us confirm our diagnosis to make sure there was no other  superimposed pathology like a rotator cuff tear. Corticosteroid would hopefully give her more immediate pain relief start her range of motion improvement. After discussing alternatives, benefits, risks of the steroid injection patient states she would like to try this first.    Consent for the injection was obtained. Risk of postinjection infection, lack of improvement, hypopigmentation and unusual allergic reaction were explained to the patient. After consent, the skin was sterilely prepped and 80 mg of Depo-Medrol and 5 cc of 0.25% plain Marcaine was was injected into the shoulder.   Patient had no complications. Patient is to ice modify activities for 24 hours. We did discuss the potential of elevation of blood sugars. Patient states she is been able to take care of this before she uses a sliding insulin scale. Patient is going to monitor her progress and improvement over the next couple weeks. If she fails to make any significant improvement then she is going to reach out to us to move forward with scheduling the MRI of the shoulder.         Bibi Meade MD

## 2023-03-02 NOTE — LETTER
3/2/2023    Patient: Lawrence Bishop   YOB: 1945   Date of Visit: 3/2/2023     Parminder Galeano, [address] [facility] [address]  [address] [facility]  Via In Basket    Dear Parminder Galeano MD,      Thank you for referring Ms. Poly Garcia to [facility] for evaluation. My notes for this consultation are attached. If you have questions, please do not hesitate to call me. I look forward to following your patient along with you.       Sincerely,    Manuel Soto MD

## 2023-05-17 SDOH — ECONOMIC STABILITY: INCOME INSECURITY: HOW HARD IS IT FOR YOU TO PAY FOR THE VERY BASICS LIKE FOOD, HOUSING, MEDICAL CARE, AND HEATING?: NOT HARD AT ALL

## 2023-05-17 SDOH — ECONOMIC STABILITY: HOUSING INSECURITY
IN THE LAST 12 MONTHS, WAS THERE A TIME WHEN YOU DID NOT HAVE A STEADY PLACE TO SLEEP OR SLEPT IN A SHELTER (INCLUDING NOW)?: NO

## 2023-05-17 SDOH — ECONOMIC STABILITY: FOOD INSECURITY: WITHIN THE PAST 12 MONTHS, THE FOOD YOU BOUGHT JUST DIDN'T LAST AND YOU DIDN'T HAVE MONEY TO GET MORE.: NEVER TRUE

## 2023-05-17 SDOH — ECONOMIC STABILITY: FOOD INSECURITY: WITHIN THE PAST 12 MONTHS, YOU WORRIED THAT YOUR FOOD WOULD RUN OUT BEFORE YOU GOT MONEY TO BUY MORE.: NEVER TRUE

## 2023-05-17 SDOH — ECONOMIC STABILITY: TRANSPORTATION INSECURITY
IN THE PAST 12 MONTHS, HAS LACK OF TRANSPORTATION KEPT YOU FROM MEETINGS, WORK, OR FROM GETTING THINGS NEEDED FOR DAILY LIVING?: NO

## 2023-05-18 ENCOUNTER — OFFICE VISIT (OUTPATIENT)
Age: 78
End: 2023-05-18
Payer: MEDICARE

## 2023-05-18 VITALS
OXYGEN SATURATION: 98 % | SYSTOLIC BLOOD PRESSURE: 95 MMHG | HEIGHT: 62 IN | TEMPERATURE: 97.4 F | HEART RATE: 67 BPM | RESPIRATION RATE: 16 BRPM | DIASTOLIC BLOOD PRESSURE: 62 MMHG | BODY MASS INDEX: 32.9 KG/M2 | WEIGHT: 178.8 LBS

## 2023-05-18 DIAGNOSIS — E83.42 HYPOMAGNESEMIA: ICD-10-CM

## 2023-05-18 DIAGNOSIS — M54.2 NECK PAIN: Primary | ICD-10-CM

## 2023-05-18 DIAGNOSIS — E11.21 TYPE 2 DIABETES MELLITUS WITH DIABETIC NEPHROPATHY, WITHOUT LONG-TERM CURRENT USE OF INSULIN (HCC): ICD-10-CM

## 2023-05-18 DIAGNOSIS — N18.31 CHRONIC KIDNEY DISEASE, STAGE 3A (HCC): ICD-10-CM

## 2023-05-18 PROCEDURE — 1123F ACP DISCUSS/DSCN MKR DOCD: CPT | Performed by: INTERNAL MEDICINE

## 2023-05-18 PROCEDURE — 1090F PRES/ABSN URINE INCON ASSESS: CPT | Performed by: INTERNAL MEDICINE

## 2023-05-18 PROCEDURE — G8399 PT W/DXA RESULTS DOCUMENT: HCPCS | Performed by: INTERNAL MEDICINE

## 2023-05-18 PROCEDURE — 3074F SYST BP LT 130 MM HG: CPT | Performed by: INTERNAL MEDICINE

## 2023-05-18 PROCEDURE — 1036F TOBACCO NON-USER: CPT | Performed by: INTERNAL MEDICINE

## 2023-05-18 PROCEDURE — G8428 CUR MEDS NOT DOCUMENT: HCPCS | Performed by: INTERNAL MEDICINE

## 2023-05-18 PROCEDURE — 3078F DIAST BP <80 MM HG: CPT | Performed by: INTERNAL MEDICINE

## 2023-05-18 PROCEDURE — G8417 CALC BMI ABV UP PARAM F/U: HCPCS | Performed by: INTERNAL MEDICINE

## 2023-05-18 PROCEDURE — 99214 OFFICE O/P EST MOD 30 MIN: CPT | Performed by: INTERNAL MEDICINE

## 2023-05-18 RX ORDER — CHLORAL HYDRATE 500 MG
CAPSULE ORAL DAILY
COMMUNITY

## 2023-05-18 ASSESSMENT — PATIENT HEALTH QUESTIONNAIRE - PHQ9
SUM OF ALL RESPONSES TO PHQ QUESTIONS 1-9: 0
SUM OF ALL RESPONSES TO PHQ QUESTIONS 1-9: 0
1. LITTLE INTEREST OR PLEASURE IN DOING THINGS: 0
SUM OF ALL RESPONSES TO PHQ QUESTIONS 1-9: 0
SUM OF ALL RESPONSES TO PHQ9 QUESTIONS 1 & 2: 0
SUM OF ALL RESPONSES TO PHQ QUESTIONS 1-9: 0
2. FEELING DOWN, DEPRESSED OR HOPELESS: 0

## 2023-05-18 NOTE — PROGRESS NOTES
ASSESSMENT & PLAN:  1. Neck pain  I think this is musculoskeletal  She will see physical therapy  Reviewed some exercises that she can do to release her trapezius and scalene muscles  -     Magnesium; Future  -     External Referral To Physical Therapy  2. Type 2 diabetes mellitus with diabetic nephropathy, without long-term current use of insulin ([facility])  Excellent control with her endocrinologist Dr. Doug Eden. Her last hemoglobin A1c was 7.1    3. Chronic kidney disease, stage 3a ([address]  On metformin  Clarene Roots  For albumin level has gone from 2 73-2 66 and now at 28 as of May 2023    4. Hypomagnesemia  Magnesium level slightly elevated will decrease her magnesium from 400 mg twice a day to daily. She will recheck her magnesium in 2 weeks-     Magnesium; Future       Follow-up with specialists: Endocrinology, cardiology      Follow-up in 6 months or earlier if needed      Chief Complaint   Patient presents with    Medication Evaluation     Diabetes  Followed very closely by Dr. Vincenzo Lau at 5 mg but cannot seem to advance due to significant nausea with medication  She has not been going to the [facility] due to some neck pain  No side effects on her medications. Her A1c has been improved  She continues on insulin, mounjaro, metformin, Farxiga      Neck pain  She reports she has some tension in the back of her neck. She can move her neck in all areas but still has some tension there. Feels like it may be muscle        Subjective:   Supa Tejeda is a 68 y.o. female with hypertension. Hypertension ROS: taking medications as instructed, no medication side effects noted, no TIA's, no chest pain on exertion, no dyspnea on exertion, no swelling of ankles. New concerns: None. Blood pressure has traditionally been very low and her prior cardiologist like to keep it low.   She is not having any lightheaded or dizziness or falls      SUBJECTIVE: Supa Tejeda is a 68 y.o. female here for follow up of

## 2023-05-24 RX ORDER — ROSUVASTATIN CALCIUM 20 MG/1
TABLET, COATED ORAL
Qty: 90 TABLET | Refills: 0 | Status: SHIPPED | OUTPATIENT
Start: 2023-05-24

## 2023-05-24 NOTE — TELEPHONE ENCOUNTER
Lipid panel available via [facility].    Future Appointments   Date Time Provider Vivien Leslie   8/23/2023 10:00 AM [facility] [place] ECHO 1 EZEKIEL VANESSA   8/23/2023 11:00 AM MD EZEKIEL Vazquez     Requested Prescriptions     Signed Prescriptions Disp Refills    rosuvastatin (CRESTOR) 20 MG tablet 90 tablet 0     Sig: TAKE 1 TABLET BY MOUTH EVERYDAY AT BEDTIME     Authorizing Provider: Alana Diez     Ordering User: Sandor schafer MD

## 2023-05-30 DIAGNOSIS — U07.1 COVID-19: ICD-10-CM

## 2023-05-31 RX ORDER — ONDANSETRON 4 MG/1
TABLET, ORALLY DISINTEGRATING ORAL
Qty: 20 TABLET | Refills: 0 | Status: SHIPPED | OUTPATIENT
Start: 2023-05-31

## 2023-06-08 DIAGNOSIS — M54.2 NECK PAIN: ICD-10-CM

## 2023-06-08 DIAGNOSIS — E83.42 HYPOMAGNESEMIA: ICD-10-CM

## 2023-06-08 LAB — MAGNESIUM SERPL-MCNC: 2.1 MG/DL (ref 1.6–2.4)

## 2023-07-05 DIAGNOSIS — I10 ESSENTIAL (PRIMARY) HYPERTENSION: ICD-10-CM

## 2023-07-05 RX ORDER — LOSARTAN POTASSIUM AND HYDROCHLOROTHIAZIDE 12.5; 5 MG/1; MG/1
TABLET ORAL
Qty: 180 TABLET | Refills: 1 | Status: SHIPPED | OUTPATIENT
Start: 2023-07-05

## 2023-07-10 DIAGNOSIS — U07.1 COVID-19: ICD-10-CM

## 2023-07-12 RX ORDER — SERTRALINE HYDROCHLORIDE 100 MG/1
TABLET, FILM COATED ORAL
Qty: 90 TABLET | Refills: 1 | Status: SHIPPED | OUTPATIENT
Start: 2023-07-12

## 2023-07-12 RX ORDER — ONDANSETRON 4 MG/1
TABLET, ORALLY DISINTEGRATING ORAL
Qty: 20 TABLET | Refills: 0 | Status: SHIPPED | OUTPATIENT
Start: 2023-07-12

## 2023-08-08 RX ORDER — ROSUVASTATIN CALCIUM 20 MG/1
TABLET, COATED ORAL
Qty: 90 TABLET | Refills: 0 | OUTPATIENT
Start: 2023-08-08

## 2023-08-08 NOTE — TELEPHONE ENCOUNTER
Requested Prescriptions     Refused Prescriptions Disp Refills    rosuvastatin (CRESTOR) 20 MG tablet [Pharmacy Med Name: ROSUVASTATIN CALCIUM 20 MG TAB] 90 tablet 0     Sig: TAKE 1 TABLET BY MOUTH EVERYDAY AT BEDTIME     Refused By: Sallie Redd     Reason for Refusal: Patient needs an appointment       Future Appointments   Date Time Provider [address]   8/23/2023 10:00 AM [facility] [place] ECHO 1 EZEKIEL [facility]   8/23/2023 11:00 AM MD EZEKIEL Alonso [facility]   8/26/2023 10:30 AM SPT MAMMO 1 SPT[facility]MO [place] [facility]       rosuvastatin (CRESTOR) 20 MG tablet [[id number]]     Order Details  Dose, Route, Frequency: As Directed   Dispense Quantity: 90 tablet Refills: 0          Sig: TAKE 1 TABLET BY MOUTH EVERYDAY AT BEDTIME         Start Date: 05/24/23 End Date: --   Written Date: 05/24/23 Expiration Date: 05/23/24   Original Order:  rosuvastatin (CRESTOR) 20 MG tablet [[id number]]   Providers    Ordering and Authorizing Provider: Tamica Buckner MD NPI: [id number]   Ordering User:  Sallie Redd RN          Pharmacy    [facility]/pharmacy #[id number] - [address] [facility], [address] [place] [address] Drive [phone] - f [phone]   [place] [address]   Phone:  [phone]  Fax:  [phone]

## 2023-08-23 ENCOUNTER — ANCILLARY PROCEDURE (OUTPATIENT)
Age: 78
End: 2023-08-23
Payer: MEDICARE

## 2023-08-23 ENCOUNTER — OFFICE VISIT (OUTPATIENT)
Age: 78
End: 2023-08-23
Payer: MEDICARE

## 2023-08-23 VITALS
HEART RATE: 72 BPM | SYSTOLIC BLOOD PRESSURE: 88 MMHG | BODY MASS INDEX: 33.29 KG/M2 | OXYGEN SATURATION: 95 % | WEIGHT: 182 LBS | DIASTOLIC BLOOD PRESSURE: 52 MMHG

## 2023-08-23 VITALS
SYSTOLIC BLOOD PRESSURE: 95 MMHG | HEIGHT: 62 IN | WEIGHT: 182 LBS | DIASTOLIC BLOOD PRESSURE: 62 MMHG | BODY MASS INDEX: 33.49 KG/M2

## 2023-08-23 DIAGNOSIS — I10 PRIMARY HYPERTENSION: Primary | ICD-10-CM

## 2023-08-23 DIAGNOSIS — I25.10 CORONARY ARTERY DISEASE INVOLVING NATIVE HEART WITHOUT ANGINA PECTORIS, UNSPECIFIED VESSEL OR LESION TYPE: ICD-10-CM

## 2023-08-23 DIAGNOSIS — E78.2 MIXED HYPERLIPIDEMIA: ICD-10-CM

## 2023-08-23 DIAGNOSIS — I25.10 CORONARY ARTERY DISEASE INVOLVING NATIVE CORONARY ARTERY OF NATIVE HEART WITHOUT ANGINA PECTORIS: ICD-10-CM

## 2023-08-23 LAB
ECHO AO ASC DIAM: 3.4 CM
ECHO AO ASCENDING AORTA INDEX: 1.85 CM/M2
ECHO AO ROOT DIAM: 3.5 CM
ECHO AO ROOT INDEX: 1.9 CM/M2
ECHO AV AREA PEAK VELOCITY: 4.3 CM2
ECHO AV AREA VTI: 4.5 CM2
ECHO AV AREA/BSA PEAK VELOCITY: 2.3 CM2/M2
ECHO AV AREA/BSA VTI: 2.4 CM2/M2
ECHO AV MEAN GRADIENT: 3 MMHG
ECHO AV MEAN VELOCITY: 0.8 M/S
ECHO AV PEAK GRADIENT: 6 MMHG
ECHO AV PEAK VELOCITY: 1.2 M/S
ECHO AV VELOCITY RATIO: 1
ECHO AV VTI: 25.6 CM
ECHO BSA: 1.9 M2
ECHO EST RA PRESSURE: 3 MMHG
ECHO LA DIAMETER INDEX: 1.68 CM/M2
ECHO LA DIAMETER: 3.1 CM
ECHO LA TO AORTIC ROOT RATIO: 0.89
ECHO LA VOL 2C: 41 ML (ref 22–52)
ECHO LA VOL 4C: 24 ML (ref 22–52)
ECHO LA VOL BP: 32 ML (ref 22–52)
ECHO LA VOL/BSA BIPLANE: 17 ML/M2 (ref 16–34)
ECHO LA VOLUME AREA LENGTH: 33 ML
ECHO LA VOLUME INDEX A2C: 22 ML/M2 (ref 16–34)
ECHO LA VOLUME INDEX A4C: 13 ML/M2 (ref 16–34)
ECHO LA VOLUME INDEX AREA LENGTH: 18 ML/M2 (ref 16–34)
ECHO LV E' LATERAL VELOCITY: 8 CM/S
ECHO LV E' SEPTAL VELOCITY: 5 CM/S
ECHO LV EJECTION FRACTION A2C: 69 %
ECHO LV EJECTION FRACTION A4C: 65 %
ECHO LV FRACTIONAL SHORTENING: 37 % (ref 28–44)
ECHO LV INTERNAL DIMENSION DIASTOLE INDEX: 2.07 CM/M2
ECHO LV INTERNAL DIMENSION DIASTOLIC: 3.8 CM (ref 3.9–5.3)
ECHO LV INTERNAL DIMENSION SYSTOLIC INDEX: 1.3 CM/M2
ECHO LV INTERNAL DIMENSION SYSTOLIC: 2.4 CM
ECHO LV IVSD: 1.1 CM (ref 0.6–0.9)
ECHO LV MASS 2D: 134.7 G (ref 67–162)
ECHO LV MASS INDEX 2D: 73.2 G/M2 (ref 43–95)
ECHO LV POSTERIOR WALL DIASTOLIC: 1.1 CM (ref 0.6–0.9)
ECHO LV RELATIVE WALL THICKNESS RATIO: 0.58
ECHO LVOT AREA: 4.2 CM2
ECHO LVOT AV VTI INDEX: 1.06
ECHO LVOT DIAM: 2.3 CM
ECHO LVOT MEAN GRADIENT: 3 MMHG
ECHO LVOT PEAK GRADIENT: 6 MMHG
ECHO LVOT PEAK VELOCITY: 1.2 M/S
ECHO LVOT STROKE VOLUME INDEX: 61.4 ML/M2
ECHO LVOT SV: 113 ML
ECHO LVOT VTI: 27.2 CM
ECHO MV A VELOCITY: 1 M/S
ECHO MV E DECELERATION TIME (DT): 270.7 MS
ECHO MV E VELOCITY: 0.7 M/S
ECHO MV E/A RATIO: 0.7
ECHO MV E/E' LATERAL: 8.75
ECHO MV E/E' RATIO (AVERAGED): 11.38
ECHO MV E/E' SEPTAL: 14
ECHO RIGHT VENTRICULAR SYSTOLIC PRESSURE (RVSP): 46 MMHG
ECHO RV BASAL DIMENSION: 4.4 CM
ECHO RV FREE WALL PEAK S': 13 CM/S
ECHO RV TAPSE: 1.8 CM (ref 1.7–?)
ECHO TV REGURGITANT MAX VELOCITY: 3.26 M/S
ECHO TV REGURGITANT PEAK GRADIENT: 42 MMHG

## 2023-08-23 PROCEDURE — 93010 ELECTROCARDIOGRAM REPORT: CPT | Performed by: INTERNAL MEDICINE

## 2023-08-23 PROCEDURE — G8399 PT W/DXA RESULTS DOCUMENT: HCPCS | Performed by: INTERNAL MEDICINE

## 2023-08-23 PROCEDURE — 1090F PRES/ABSN URINE INCON ASSESS: CPT | Performed by: INTERNAL MEDICINE

## 2023-08-23 PROCEDURE — G8427 DOCREV CUR MEDS BY ELIG CLIN: HCPCS | Performed by: INTERNAL MEDICINE

## 2023-08-23 PROCEDURE — 1123F ACP DISCUSS/DSCN MKR DOCD: CPT | Performed by: INTERNAL MEDICINE

## 2023-08-23 PROCEDURE — G8417 CALC BMI ABV UP PARAM F/U: HCPCS | Performed by: INTERNAL MEDICINE

## 2023-08-23 PROCEDURE — 99214 OFFICE O/P EST MOD 30 MIN: CPT | Performed by: INTERNAL MEDICINE

## 2023-08-23 PROCEDURE — 93005 ELECTROCARDIOGRAM TRACING: CPT | Performed by: INTERNAL MEDICINE

## 2023-08-23 PROCEDURE — 3074F SYST BP LT 130 MM HG: CPT | Performed by: INTERNAL MEDICINE

## 2023-08-23 PROCEDURE — 1036F TOBACCO NON-USER: CPT | Performed by: INTERNAL MEDICINE

## 2023-08-23 PROCEDURE — 3078F DIAST BP <80 MM HG: CPT | Performed by: INTERNAL MEDICINE

## 2023-08-23 PROCEDURE — 93306 TTE W/DOPPLER COMPLETE: CPT

## 2023-08-23 RX ORDER — INSULIN GLARGINE 300 U/ML
20-30 INJECTION, SOLUTION SUBCUTANEOUS DAILY
COMMUNITY
Start: 2023-06-09

## 2023-08-23 ASSESSMENT — PATIENT HEALTH QUESTIONNAIRE - PHQ9
2. FEELING DOWN, DEPRESSED OR HOPELESS: 0
SUM OF ALL RESPONSES TO PHQ QUESTIONS 1-9: 0
SUM OF ALL RESPONSES TO PHQ QUESTIONS 1-9: 0
1. LITTLE INTEREST OR PLEASURE IN DOING THINGS: 0
SUM OF ALL RESPONSES TO PHQ QUESTIONS 1-9: 0
SUM OF ALL RESPONSES TO PHQ9 QUESTIONS 1 & 2: 0
SUM OF ALL RESPONSES TO PHQ QUESTIONS 1-9: 0

## 2023-08-23 NOTE — PROGRESS NOTES
MD Fortino Harmon, CORBY                         [facility]. [phone]            Cardiology Consult/Progress Note      Montana Rosales  66 y.o.  1945    Requesting/referring provider: Yadira Arellano MD     Reason for Consult: follow up     Assessment/Plan:  Coronary artery disease, Possible ACS vs. Coronary dissection in 2004; Severe single vessel disease involving RCA, s/p PCI By Dr Gino Parekh   LDL in 5/2023 --35  Normal LV systolic function  Hypertension  Morbid obesity with sleep apnea  Diabetes mellitus, type II  Sinus bradycardia with higher doses of beta blocker     Mrs. Ari Jack was seen with her  for discussion regarding coronary artery disease. She had elevated calcium scoring prior balloon angioplasty with Dr. Chandra Gibson about 20 years ago. Off-and-on she has atypical chest discomfort for the past 6 months  she is angina free. I believe she has underlying likely significant coronary disease but currently that appears to be not causing any symptoms and can be managed medically. She is on optimal medical therapy at this time but due to sinus bradycardia  and tiredness,, no-dose of beta-blocker metoprolol to 25 mg twice a day. Her labs from [place] [facility] were reviewed and show LDL of 35 mg/dL. Echo demonstrates normal LV function with no wall motion abnormalities. Otherwise currently angina free and does not require any further interventions. Continue current medications including losartan hydrochlorothiazide for blood pressure control, rosuvastatin for lipid control  and Cloria Luna for managing heart failure with preserved ejection fraction. Aspirin 81 mg may be continued as well for diagnosis of presumed CAD.        Investigations ordered    []    High complexity decision making was performed  []    Patient is at high-risk of decompensation with multiple organ involvement  []    Complex/difficult social determinants of health outcomes  Total

## 2023-08-24 DIAGNOSIS — U07.1 COVID-19: ICD-10-CM

## 2023-08-26 ENCOUNTER — HOSPITAL ENCOUNTER (OUTPATIENT)
Facility: HOSPITAL | Age: 78
End: 2023-08-26
Attending: INTERNAL MEDICINE
Payer: MEDICARE

## 2023-08-26 DIAGNOSIS — Z12.31 VISIT FOR SCREENING MAMMOGRAM: ICD-10-CM

## 2023-08-26 PROCEDURE — 77067 SCR MAMMO BI INCL CAD: CPT

## 2023-08-28 RX ORDER — ONDANSETRON 4 MG/1
TABLET, ORALLY DISINTEGRATING ORAL
Qty: 20 TABLET | Refills: 0 | Status: SHIPPED | OUTPATIENT
Start: 2023-08-28

## 2023-08-28 RX ORDER — ROSUVASTATIN CALCIUM 20 MG/1
TABLET, COATED ORAL
Qty: 90 TABLET | Refills: 0 | Status: SHIPPED | OUTPATIENT
Start: 2023-08-28

## 2023-08-28 NOTE — TELEPHONE ENCOUNTER
Requested Prescriptions     Signed Prescriptions Disp Refills    rosuvastatin (CRESTOR) 20 MG tablet 90 tablet 0     Sig: TAKE 1 TABLET BY MOUTH EVERYDAY AT BEDTIME     Authorizing Provider: Melinda Tang     Ordering User: Walt Dorado  per MD    Future Appointments   Date Time Provider [address]   8/28/2024 11:00 AM MD EZEKIEL Parra AMB

## 2023-08-31 ENCOUNTER — CLINICAL DOCUMENTATION (OUTPATIENT)
Age: 78
End: 2023-08-31

## 2023-09-16 DIAGNOSIS — I10 ESSENTIAL (PRIMARY) HYPERTENSION: ICD-10-CM

## 2023-09-19 NOTE — TELEPHONE ENCOUNTER
Requested Prescriptions     Signed Prescriptions Disp Refills    metoprolol tartrate (LOPRESSOR) 25 MG tablet 180 tablet 3     Sig: TAKE 1 TABLET BY MOUTH TWICE A DAY     Authorizing Provider: Treva Malloy     Ordering User: Steff RANDLE per MD     Future Appointments   Date Time Provider [address]   8/28/2024 11:00 AM MD EZEKIEL Craven AMB

## 2023-09-20 NOTE — TELEPHONE ENCOUNTER
ALAN Herman CNP  to Me        9/20/23  9:32 AM  Can you verify if shes symptomatic? September 19, 2023  Me  to ALAN Herman CNP, MD EL      9/19/23  2:46 PM  Saw her last BP in office. Didn't see it addressed in note. Any concerns?

## 2023-09-20 NOTE — TELEPHONE ENCOUNTER
Spoke with . 2 patient identifiers used. States patient has been fine and her medications makes her BP low and she is know to have this. Stated Dr. Coats was not concerned at appointment. Let  know that the call was just to clarify.  verbalized understanding and was appreciative of call.

## 2023-10-04 PROBLEM — F33.0 MAJOR DEPRESSIVE DISORDER, RECURRENT, MILD (HCC): Status: ACTIVE | Noted: 2023-10-04

## 2023-10-04 PROBLEM — F33.1 MAJOR DEPRESSIVE DISORDER, RECURRENT, MODERATE (HCC): Status: ACTIVE | Noted: 2023-10-04

## 2023-10-04 PROBLEM — F33.9 MAJOR DEPRESSIVE DISORDER, RECURRENT, UNSPECIFIED (HCC): Status: ACTIVE | Noted: 2023-10-04

## 2023-12-04 RX ORDER — ROSUVASTATIN CALCIUM 20 MG/1
TABLET, COATED ORAL
Qty: 90 TABLET | Refills: 2 | Status: SHIPPED | OUTPATIENT
Start: 2023-12-04

## 2023-12-04 NOTE — TELEPHONE ENCOUNTER
Requested Prescriptions     Signed Prescriptions Disp Refills    rosuvastatin (CRESTOR) 20 MG tablet 90 tablet 2     Sig: TAKE 1 TABLET BY MOUTH EVERYDAY AT BEDTIME     Authorizing Provider: Michael Landon     Ordering User: Samuel Escalante     Verbal order per Dr. Tasneem August.     Future Appointments   Date Time Provider [address]   8/28/2024 11:00 AM MD EZEKIEL Wyman AMB

## 2023-12-30 DIAGNOSIS — I10 ESSENTIAL (PRIMARY) HYPERTENSION: ICD-10-CM

## 2024-01-01 RX ORDER — LOSARTAN POTASSIUM AND HYDROCHLOROTHIAZIDE 12.5; 5 MG/1; MG/1
TABLET ORAL
Qty: 180 TABLET | Refills: 0 | Status: SHIPPED | OUTPATIENT
Start: 2024-01-01

## 2024-01-07 RX ORDER — SERTRALINE HYDROCHLORIDE 100 MG/1
TABLET, FILM COATED ORAL
Qty: 90 TABLET | Refills: 1 | Status: SHIPPED | OUTPATIENT
Start: 2024-01-07

## 2024-01-10 ENCOUNTER — HOSPITAL ENCOUNTER (OUTPATIENT)
Facility: HOSPITAL | Age: 79
Discharge: HOME | End: 2024-01-13
Payer: MEDICARE

## 2024-01-10 DIAGNOSIS — J40 BRONCHITIS: ICD-10-CM

## 2024-01-10 LAB — CREAT BLD-MCNC: 1.1 MG/DL (ref 0.6–1.3)

## 2024-01-10 PROCEDURE — 6360000004 HC RX CONTRAST MEDICATION: Performed by: FAMILY MEDICINE

## 2024-01-10 PROCEDURE — 71260 CT THORAX DX C+: CPT

## 2024-01-10 PROCEDURE — 82565 ASSAY OF CREATININE: CPT

## 2024-01-10 RX ADMIN — IOPAMIDOL 100 ML: 612 INJECTION, SOLUTION INTRAVENOUS at 12:52

## 2024-03-08 DIAGNOSIS — U07.1 COVID-19: ICD-10-CM

## 2024-03-08 RX ORDER — ONDANSETRON 4 MG/1
TABLET, ORALLY DISINTEGRATING ORAL
Qty: 20 TABLET | Refills: 0 | Status: SHIPPED | OUTPATIENT
Start: 2024-03-08

## 2024-03-08 RX ORDER — ONDANSETRON 4 MG/1
TABLET, ORALLY DISINTEGRATING ORAL
Qty: 20 TABLET | Refills: 0 | Status: SHIPPED | OUTPATIENT
Start: 2024-03-08 | End: 2024-03-08 | Stop reason: SDUPTHER

## 2024-03-28 DIAGNOSIS — I10 ESSENTIAL (PRIMARY) HYPERTENSION: ICD-10-CM

## 2024-03-28 RX ORDER — LOSARTAN POTASSIUM AND HYDROCHLOROTHIAZIDE 12.5; 5 MG/1; MG/1
TABLET ORAL
Qty: 180 TABLET | Refills: 0 | Status: SHIPPED | OUTPATIENT
Start: 2024-03-28

## 2024-05-05 DIAGNOSIS — U07.1 COVID-19: ICD-10-CM

## 2024-05-07 RX ORDER — ONDANSETRON 4 MG/1
TABLET, ORALLY DISINTEGRATING ORAL
Qty: 20 TABLET | Refills: 0 | Status: SHIPPED | OUTPATIENT
Start: 2024-05-07

## 2024-05-19 DIAGNOSIS — I10 ESSENTIAL (PRIMARY) HYPERTENSION: ICD-10-CM

## 2024-05-20 RX ORDER — LOSARTAN POTASSIUM AND HYDROCHLOROTHIAZIDE 12.5; 5 MG/1; MG/1
TABLET ORAL
Qty: 180 TABLET | Refills: 0 | Status: SHIPPED | OUTPATIENT
Start: 2024-05-20

## 2024-07-02 RX ORDER — SERTRALINE HYDROCHLORIDE 100 MG/1
TABLET, FILM COATED ORAL
Qty: 90 TABLET | Refills: 0 | Status: SHIPPED | OUTPATIENT
Start: 2024-07-02

## 2024-07-31 SDOH — ECONOMIC STABILITY: FOOD INSECURITY: WITHIN THE PAST 12 MONTHS, YOU WORRIED THAT YOUR FOOD WOULD RUN OUT BEFORE YOU GOT MONEY TO BUY MORE.: NEVER TRUE

## 2024-07-31 SDOH — ECONOMIC STABILITY: FOOD INSECURITY: WITHIN THE PAST 12 MONTHS, THE FOOD YOU BOUGHT JUST DIDN'T LAST AND YOU DIDN'T HAVE MONEY TO GET MORE.: NEVER TRUE

## 2024-07-31 SDOH — ECONOMIC STABILITY: INCOME INSECURITY: HOW HARD IS IT FOR YOU TO PAY FOR THE VERY BASICS LIKE FOOD, HOUSING, MEDICAL CARE, AND HEATING?: NOT HARD AT ALL

## 2024-07-31 SDOH — HEALTH STABILITY: PHYSICAL HEALTH: ON AVERAGE, HOW MANY DAYS PER WEEK DO YOU ENGAGE IN MODERATE TO STRENUOUS EXERCISE (LIKE A BRISK WALK)?: 3 DAYS

## 2024-07-31 SDOH — HEALTH STABILITY: PHYSICAL HEALTH: ON AVERAGE, HOW MANY MINUTES DO YOU ENGAGE IN EXERCISE AT THIS LEVEL?: 60 MIN

## 2024-07-31 ASSESSMENT — PATIENT HEALTH QUESTIONNAIRE - PHQ9
SUM OF ALL RESPONSES TO PHQ9 QUESTIONS 1 & 2: 0
1. LITTLE INTEREST OR PLEASURE IN DOING THINGS: NOT AT ALL
SUM OF ALL RESPONSES TO PHQ QUESTIONS 1-9: 0
2. FEELING DOWN, DEPRESSED OR HOPELESS: NOT AT ALL

## 2024-07-31 ASSESSMENT — LIFESTYLE VARIABLES
HOW MANY STANDARD DRINKS CONTAINING ALCOHOL DO YOU HAVE ON A TYPICAL DAY: PATIENT DOES NOT DRINK
HOW OFTEN DO YOU HAVE A DRINK CONTAINING ALCOHOL: NEVER
HOW MANY STANDARD DRINKS CONTAINING ALCOHOL DO YOU HAVE ON A TYPICAL DAY: 0
HOW OFTEN DO YOU HAVE SIX OR MORE DRINKS ON ONE OCCASION: 1
HOW OFTEN DO YOU HAVE A DRINK CONTAINING ALCOHOL: 1

## 2024-08-01 ENCOUNTER — TELEPHONE (OUTPATIENT)
Age: 79
End: 2024-08-01

## 2024-08-01 ENCOUNTER — OFFICE VISIT (OUTPATIENT)
Age: 79
End: 2024-08-01
Payer: MEDICARE

## 2024-08-01 VITALS
BODY MASS INDEX: 31.94 KG/M2 | RESPIRATION RATE: 14 BRPM | DIASTOLIC BLOOD PRESSURE: 61 MMHG | HEART RATE: 63 BPM | HEIGHT: 62 IN | WEIGHT: 173.6 LBS | OXYGEN SATURATION: 90 % | SYSTOLIC BLOOD PRESSURE: 94 MMHG

## 2024-08-01 DIAGNOSIS — F33.1 MAJOR DEPRESSIVE DISORDER, RECURRENT, MODERATE (HCC): ICD-10-CM

## 2024-08-01 DIAGNOSIS — Z12.31 ENCOUNTER FOR SCREENING MAMMOGRAM FOR BREAST CANCER: ICD-10-CM

## 2024-08-01 DIAGNOSIS — Z12.9 CANCER SCREENING: ICD-10-CM

## 2024-08-01 DIAGNOSIS — G47.30 SLEEP APNEA, UNSPECIFIED TYPE: ICD-10-CM

## 2024-08-01 DIAGNOSIS — Z00.00 MEDICARE ANNUAL WELLNESS VISIT, SUBSEQUENT: Primary | ICD-10-CM

## 2024-08-01 DIAGNOSIS — M85.88 OSTEOPENIA OF LUMBAR SPINE: ICD-10-CM

## 2024-08-01 DIAGNOSIS — E11.21 TYPE 2 DIABETES MELLITUS WITH DIABETIC NEPHROPATHY, WITHOUT LONG-TERM CURRENT USE OF INSULIN (HCC): ICD-10-CM

## 2024-08-01 PROCEDURE — 99213 OFFICE O/P EST LOW 20 MIN: CPT | Performed by: INTERNAL MEDICINE

## 2024-08-01 NOTE — PROGRESS NOTES
She has been notes that there has been some memory changes.    Diabetes  Tolerating her medications.  She is on SGLT2 and no uti sx  On lispro sliding scale  Toujear 24 units.  She denies hypoglycemia.  Nausea associated with mounjaro--after she eats only at dinner and will take Zofran      Memory loss  Reports seeing neuropsychologist several years ago and did battery of tests.  Not great sleeper and was diagnosed with CPAP but not using machine.   notes that she would not use the CPAP      Depression  Thinks her sertraline is at the right dose.  Enjoying time with her kids and grandkids.  She has 2 grand kids who play lacrosse.  She enjoys seeing them being happy.  3 kids and 6 grandchildren        Subjective:   Nik Rhodes is a 79 y.o. female with hypertension.  Hypertension ROS: taking medications as instructed, no medication side effects noted, no TIA's, no chest pain on exertion, no dyspnea on exertion, no swelling of ankles.   New concerns: None.       SUBJECTIVE: Nik Rhodes is a 77 y.o. female here for follow up of hypothyroidism.  Lab Results   Component Value Date/Time    TSH 1.94 2021 11:50 AM     Thyroid ROS: denies fatigue, weight changes, heat/cold intolerance, bowel/skin changes or CVS symptoms.       Past Medical History:   Diagnosis Date    Arthritis     CAD (coronary artery disease)     STENT PLACED>PLAQUE BROKE OFF--HAD \"MINOR MI\"    Deviated septum 13    HAS TROUBLE BREATHING THROUGH LEFT SIDE; CT SHOWED POLYP    Diabetes ([facility])     Heart disease     Hypertension     Incontinence     Nasal polyp     Unspecified sleep apnea 2013    biphasic /bipap     Past Surgical History:   Procedure Laterality Date    HX CATARACT REMOVAL      HX  SECTION      X3    HX COLONOSCOPY  2011    HX HEART CATHETERIZATION      HX HEENT  ?    SEPTOPLASTY    [place] CARDIAC SURG PROCEDURE UNLIST  2006    CARDIAC CATH;ANGIOPLASTY WITH STENT     Social History

## 2024-08-01 NOTE — TELEPHONE ENCOUNTER
----- Message from Casie Gilman sent at 8/1/2024  1:21 PM EDT -----  Regarding: ECC Message to Provider  ECC Message to Provider    Relationship to Patient: Covered Entity\ Guero pal     Additional Information : caller want to have the corrected order of the mammogram of the patient.  --------------------------------------------------------------------------------------------------------------------------    Call Back Information: OK to leave message on voicemail  Preferred Call Back Number: Phone [phone] (home)   [phone]

## 2024-08-16 ENCOUNTER — TELEPHONE (OUTPATIENT)
Age: 79
End: 2024-08-16

## 2024-08-16 NOTE — TELEPHONE ENCOUNTER
Phoned patient to schedule a sleep consult per     Torri Solomon MD.  Patient's spouse stated that she was at an appointment.  Will have her return the call to schedule.

## 2024-08-26 DIAGNOSIS — I10 ESSENTIAL (PRIMARY) HYPERTENSION: ICD-10-CM

## 2024-08-26 RX ORDER — METOPROLOL TARTRATE 25 MG/1
25 TABLET, FILM COATED ORAL 2 TIMES DAILY
Qty: 180 TABLET | Refills: 3 | Status: SHIPPED | OUTPATIENT
Start: 2024-08-26

## 2024-08-26 RX ORDER — ROSUVASTATIN CALCIUM 20 MG/1
TABLET, COATED ORAL
Qty: 90 TABLET | Refills: 2 | Status: SHIPPED | OUTPATIENT
Start: 2024-08-26

## 2024-08-26 NOTE — TELEPHONE ENCOUNTER
Requested Prescriptions     Signed Prescriptions Disp Refills    rosuvastatin (CRESTOR) 20 MG tablet 90 tablet 2     Sig: TAKE 1 TABLET BY MOUTH EVERYDAY AT BEDTIME     Authorizing Provider: SUNNY WALKER     Ordering User: JEANNIE SHAFER per MD    Future Appointments   Date Time Provider Department Center   8/27/2024  9:30 AM SPT DEXA 1 SPTMAMMO [place] [facility]   8/27/2024 10:00 AM SPT MAMMO 1 SPTMAMMO [place] [facility]   8/28/2024 11:00 AM Sunny [place] MD [facility]   8/5/2025  9:00 AM Torri Solomon MD [facility] DEP       
BIB mother from home for c/o fever x 12 days, body aches and painful urination x 1 day. Tested + for flu A on 12/23 but still with fever. Last given motrin 930 am.

## 2024-08-26 NOTE — TELEPHONE ENCOUNTER
Requested Prescriptions     Signed Prescriptions Disp Refills    metoprolol tartrate (LOPRESSOR) 25 MG tablet 180 tablet 3     Sig: Take 1 tablet by mouth 2 times daily     Authorizing Provider: SUNNY WALKER     Ordering User: JEANNIE SHAFER per MD    Future Appointments   Date Time Provider Department Center   8/27/2024  9:30 AM SPT DEXA 1 SPTMAMMO [place] [facility]   8/27/2024 10:00 AM SPT MAMMO 1 [facility]PT[facility]MO [place] [facility]   8/28/2024 11:00 AM Sunny [place] MD [facility]   8/5/2025  9:00 AM Torri Solomon MD [facility] DEP

## 2024-08-27 ENCOUNTER — HOSPITAL ENCOUNTER (OUTPATIENT)
Facility: HOSPITAL | Age: 79
Discharge: HOME | End: 2024-08-30
Attending: INTERNAL MEDICINE
Payer: MEDICARE

## 2024-08-27 DIAGNOSIS — M85.88 OSTEOPENIA OF LUMBAR SPINE: ICD-10-CM

## 2024-08-27 DIAGNOSIS — Z12.31 ENCOUNTER FOR SCREENING MAMMOGRAM FOR BREAST CANCER: ICD-10-CM

## 2024-08-27 DIAGNOSIS — Z12.9 CANCER SCREENING: ICD-10-CM

## 2024-08-27 PROCEDURE — 77080 DXA BONE DENSITY AXIAL: CPT

## 2024-08-27 PROCEDURE — 77063 BREAST TOMOSYNTHESIS BI: CPT

## 2024-08-28 ENCOUNTER — OFFICE VISIT (OUTPATIENT)
Age: 79
End: 2024-08-28
Payer: MEDICARE

## 2024-08-28 VITALS
HEIGHT: 62 IN | WEIGHT: 171 LBS | HEART RATE: 60 BPM | DIASTOLIC BLOOD PRESSURE: 60 MMHG | RESPIRATION RATE: 16 BRPM | BODY MASS INDEX: 31.47 KG/M2 | SYSTOLIC BLOOD PRESSURE: 120 MMHG | OXYGEN SATURATION: 92 %

## 2024-08-28 DIAGNOSIS — I10 ESSENTIAL (PRIMARY) HYPERTENSION: Primary | ICD-10-CM

## 2024-08-28 PROCEDURE — 3074F SYST BP LT 130 MM HG: CPT | Performed by: INTERNAL MEDICINE

## 2024-08-28 PROCEDURE — G8399 PT W/DXA RESULTS DOCUMENT: HCPCS | Performed by: INTERNAL MEDICINE

## 2024-08-28 PROCEDURE — 93010 ELECTROCARDIOGRAM REPORT: CPT | Performed by: INTERNAL MEDICINE

## 2024-08-28 PROCEDURE — 99214 OFFICE O/P EST MOD 30 MIN: CPT | Performed by: INTERNAL MEDICINE

## 2024-08-28 PROCEDURE — 1123F ACP DISCUSS/DSCN MKR DOCD: CPT | Performed by: INTERNAL MEDICINE

## 2024-08-28 PROCEDURE — 1036F TOBACCO NON-USER: CPT | Performed by: INTERNAL MEDICINE

## 2024-08-28 PROCEDURE — 1090F PRES/ABSN URINE INCON ASSESS: CPT | Performed by: INTERNAL MEDICINE

## 2024-08-28 PROCEDURE — G8428 CUR MEDS NOT DOCUMENT: HCPCS | Performed by: INTERNAL MEDICINE

## 2024-08-28 PROCEDURE — G8417 CALC BMI ABV UP PARAM F/U: HCPCS | Performed by: INTERNAL MEDICINE

## 2024-08-28 PROCEDURE — 93005 ELECTROCARDIOGRAM TRACING: CPT | Performed by: INTERNAL MEDICINE

## 2024-08-28 PROCEDURE — 3078F DIAST BP <80 MM HG: CPT | Performed by: INTERNAL MEDICINE

## 2024-08-28 RX ORDER — CALCIUM CARBONATE/VITAMIN D3 500-10/5ML
1 LIQUID (ML) ORAL DAILY
COMMUNITY

## 2024-08-28 RX ORDER — LOSARTAN POTASSIUM AND HYDROCHLOROTHIAZIDE 12.5; 5 MG/1; MG/1
1 TABLET ORAL 2 TIMES DAILY
Qty: 180 TABLET | Refills: 3 | Status: SHIPPED | OUTPATIENT
Start: 2024-08-28

## 2024-08-28 RX ORDER — TIRZEPATIDE 7.5 MG/.5ML
INJECTION, SOLUTION SUBCUTANEOUS
COMMUNITY
Start: 2024-08-19

## 2024-08-28 NOTE — PROGRESS NOTES
drug-eluting stent at [facility] by Dr. Adams.   In 2019 summer, patient was in  [place] and had 2 episodes of chest pain for which she had to visit hospital ER.  Troponins were negative at that time and they recommended further follow-up with cardiology.  She was suggested to undergo a stress test at that time but declined.      Review of system:  Patient reports no dyspnea/PND/Orthpnea/CP.   Pt reports no cough/fever/focal neurological deficits/abdominal pain.  All other systems negative except as above.       Patient Active Problem List   Diagnosis    Heart disease    Hypertension    Type 2 diabetes with nephropathy (HCC)    Severe obesity (BMI 35.0-39.9) with comorbidity (HCC)    Incontinence without sensory awareness    Urge incontinence of urine    CAD (coronary artery disease)    Arthritis    Advanced care planning/counseling discussion    Mixed hyperlipidemia    Diabetes (HCC)    Female stress incontinence    Chronic renal disease, stage III ([facility])    Type 2 diabetes mellitus with chronic kidney disease (HCC)    Major depressive disorder, recurrent, mild    Major depressive disorder, recurrent, moderate    Major depressive disorder, recurrent, unspecified       Allergies   Allergen Reactions    Latex Rash    Ibuprofen Swelling and Other (See Comments)     FEET SWELL    Other Reaction(s): hives, SWELLING IN FEET, Unknown    Reaction Type: Allergy; Reaction(s): pedal swelling   FEET SWELL   FEET SWELL      FEET SWELL    Penicillins Swelling, Itching and Rash    Sulfa Antibiotics Itching, Rash, Swelling and Other (See Comments)     Reaction Type: Allergy; Reaction(s): Itching,Rash,Swelling   Other reaction(s): Rash    Benazepril Cough    Cefuroxime Axetil Itching    Adhesive Tape Itching and Rash    Cephalosporins Rash       Current Outpatient Medications on File Prior to Visit   Medication Sig Dispense Refill    Continuous Glucose Sensor (FREESTYLE ZI 2 SENSOR) MISC CHECK BLOOD SUGAR DX-E11.21 CHANGE SENSOR  EVERY 14 DAYS 84 DAYS      MOUNJARO 7.5 MG/0.5ML SOPN SC injection INJECT 7.5 MG SUBCUTANEOUS ONCE A WEEK 85 DAYS      Magnesium Oxide 400 MG CAPS Take 1 capsule by mouth daily      rosuvastatin (CRESTOR) 20 MG tablet TAKE 1 TABLET BY MOUTH EVERYDAY AT BEDTIME 90 tablet 2    metoprolol tartrate (LOPRESSOR) 25 MG tablet Take 1 tablet by mouth 2 times daily 180 tablet 3    sertraline (ZOLOFT) 100 MG tablet TAKE 1 TABLET BY MOUTH EVERY DAY IN THE EVENING 90 tablet 0    losartan-hydroCHLOROthiazide (HYZAAR) 50-12.5 MG per tablet TAKE 1 TABLET BY MOUTH TWICE A  tablet 0    ondansetron (ZOFRAN-ODT) 4 MG disintegrating tablet DISSOLVE 1 TABLET UNDER TONGUE EVERY 8 HOURS AS NEEDED FOR NAUSEA AND VOMITING 20 tablet 0    TOUJEO SOLOSTAR 300 UNIT/ML concentrated injection pen Inject 20-30 Units into the skin daily      Omega-3 Fatty Acids (FISH OIL) 1000 MG capsule Take by mouth daily      vitamin D 25 MCG (1000 UT) CAPS Take 1 capsule by mouth      cyanocobalamin 1000 MCG tablet Take 1 tablet by mouth daily      dapagliflozin (FARXIGA) 5 MG tablet Take 1 tablet by mouth daily      Finerenone (KERENDIA) 20 MG TABS Take 1 tablet by mouth daily      insulin lispro, 1 Unit Dial, (HUMALOG/ADMELOG) 100 UNIT/ML SOPN Inject 28 Units into the skin      levothyroxine (SYNTHROID) 50 MCG tablet Take 1 tablet by mouth daily      metFORMIN (GLUCOPHAGE-XR) 500 MG extended release tablet Take 2 tablets by mouth 2 times daily      Tirzepatide (MOUNJARO) 10 MG/0.5ML SOPN SC injection Inject 5 mg into the skin (Patient not taking: Reported on 8/28/2024)       No current facility-administered medications on file prior to visit.       Past Medical History:   Diagnosis Date    Arthritis     CAD (coronary artery disease) 2006    STENT PLACED>PLAQUE BROKE OFF--HAD \"MINOR MI\"    Deviated septum 12/20/13    HAS TROUBLE BREATHING THROUGH LEFT SIDE; CT SHOWED POLYP    Diabetes (HCC)     Heart disease     Hypertension     Incontinence     Nasal

## 2024-09-27 RX ORDER — SERTRALINE HYDROCHLORIDE 100 MG/1
TABLET, FILM COATED ORAL
Qty: 90 TABLET | Refills: 0 | Status: SHIPPED | OUTPATIENT
Start: 2024-09-27

## 2024-11-20 ENCOUNTER — OFFICE VISIT (OUTPATIENT)
Facility: CLINIC | Age: 79
End: 2024-11-20
Payer: MEDICARE

## 2024-11-20 VITALS
HEIGHT: 62 IN | RESPIRATION RATE: 14 BRPM | SYSTOLIC BLOOD PRESSURE: 110 MMHG | WEIGHT: 170.8 LBS | DIASTOLIC BLOOD PRESSURE: 58 MMHG | HEART RATE: 57 BPM | OXYGEN SATURATION: 94 % | BODY MASS INDEX: 31.43 KG/M2

## 2024-11-20 DIAGNOSIS — E11.9 TYPE 2 DIABETES MELLITUS WITHOUT COMPLICATION, WITHOUT LONG-TERM CURRENT USE OF INSULIN (HCC): ICD-10-CM

## 2024-11-20 DIAGNOSIS — G47.33 OBSTRUCTIVE SLEEP APNEA SYNDROME: ICD-10-CM

## 2024-11-20 DIAGNOSIS — I10 ESSENTIAL (PRIMARY) HYPERTENSION: ICD-10-CM

## 2024-11-20 DIAGNOSIS — T78.40XD ALLERGY, SUBSEQUENT ENCOUNTER: Primary | ICD-10-CM

## 2024-11-20 DIAGNOSIS — R41.3 MEMORY CHANGE: ICD-10-CM

## 2024-11-20 PROCEDURE — 1159F MED LIST DOCD IN RCRD: CPT | Performed by: INTERNAL MEDICINE

## 2024-11-20 PROCEDURE — 1123F ACP DISCUSS/DSCN MKR DOCD: CPT | Performed by: INTERNAL MEDICINE

## 2024-11-20 PROCEDURE — 1090F PRES/ABSN URINE INCON ASSESS: CPT | Performed by: INTERNAL MEDICINE

## 2024-11-20 PROCEDURE — 3074F SYST BP LT 130 MM HG: CPT | Performed by: INTERNAL MEDICINE

## 2024-11-20 PROCEDURE — 3078F DIAST BP <80 MM HG: CPT | Performed by: INTERNAL MEDICINE

## 2024-11-20 PROCEDURE — 99214 OFFICE O/P EST MOD 30 MIN: CPT | Performed by: INTERNAL MEDICINE

## 2024-11-20 PROCEDURE — G8399 PT W/DXA RESULTS DOCUMENT: HCPCS | Performed by: INTERNAL MEDICINE

## 2024-11-20 PROCEDURE — G8417 CALC BMI ABV UP PARAM F/U: HCPCS | Performed by: INTERNAL MEDICINE

## 2024-11-20 PROCEDURE — 1036F TOBACCO NON-USER: CPT | Performed by: INTERNAL MEDICINE

## 2024-11-20 PROCEDURE — 1160F RVW MEDS BY RX/DR IN RCRD: CPT | Performed by: INTERNAL MEDICINE

## 2024-11-20 PROCEDURE — G8427 DOCREV CUR MEDS BY ELIG CLIN: HCPCS | Performed by: INTERNAL MEDICINE

## 2024-11-20 PROCEDURE — G8484 FLU IMMUNIZE NO ADMIN: HCPCS | Performed by: INTERNAL MEDICINE

## 2024-11-20 RX ORDER — EPINEPHRINE 0.3 MG/.3ML
0.3 INJECTION SUBCUTANEOUS ONCE
Qty: 0.3 ML | Refills: 0 | Status: SHIPPED | OUTPATIENT
Start: 2024-11-20 | End: 2024-11-20

## 2024-11-20 ASSESSMENT — PATIENT HEALTH QUESTIONNAIRE - PHQ9
SUM OF ALL RESPONSES TO PHQ QUESTIONS 1-9: 0
SUM OF ALL RESPONSES TO PHQ QUESTIONS 1-9: 0
SUM OF ALL RESPONSES TO PHQ9 QUESTIONS 1 & 2: 0
1. LITTLE INTEREST OR PLEASURE IN DOING THINGS: NOT AT ALL
SUM OF ALL RESPONSES TO PHQ QUESTIONS 1-9: 0
SUM OF ALL RESPONSES TO PHQ QUESTIONS 1-9: 0
2. FEELING DOWN, DEPRESSED OR HOPELESS: NOT AT ALL

## 2024-11-20 NOTE — PROGRESS NOTES
ASSESSMENT & PLAN:  1. Allergy, subsequent encounter  New problem  Will refer to allergy for food allergy and/or antibiotic allergy eval.  History of penicillin allergy  -     Amb External Referral To Allergy  -     EPINEPHrine (EPIPEN 2-SHANELLE) 0.3 MG/0.3ML SOAJ injection; Inject 0.3 mLs into the muscle once for 1 dose Use as directed for allergic reaction, Disp-0.3 mL, R-0Normal  2. Obstructive sleep apnea syndrome  Not controlled  Notes not compliant with CPAP due to machinery possible inspire candidate  Note that her memory change may be related to her sleep and reinforced/encouraged compliance with CPAP machine and follow-up with sleep clinic  -     External Referral To Sleep Medicine  3. Memory change   notes repeat of questions, no significant functional changes  Dr. Ugarte evaluated in 2019 we will get a repeat evaluation  Continue aggressive cardiovascular management to decrease risk for vascular dementia  -     [facility] - Pepper Summers, Jose A, NeuropsychologyJose A ([address])  4. Type 2 diabetes mellitus without complication, without long-term current use of insulin (HCC)  Nicely controlled and losing weight on Mounjaro 7.5 mg weekly  Continue exercise and encouraged weight training if possible  5. Essential (primary) hypertension  Good control continue meds       Follow-up in 6 months or earlier if needed.  Chief Complaint   Patient presents with    Allergic Reaction     Reaction to blueberries     Patient presents with .    Food allergy  Patient reports 1 month ago ate her total cereal with skim milk and blueberries.  A few hours following this she felt her whole body getting itchy.    [facility] and [facility] Medrol Dosepak.  She still felt ill and the following day was cold and shaking like a leaf.  [facility] and they put her on Omnicef.  She took 1 dose and developed rash all over her body.  Note that rash was primarily on thigh and chest and

## 2024-11-22 ENCOUNTER — TELEPHONE (OUTPATIENT)
Age: 79
End: 2024-11-22

## 2024-11-25 NOTE — TELEPHONE ENCOUNTER
Pt  calling back for an update on scheduling call back. Advised that a message has been sent and made aware of time frame for call back

## 2024-12-23 RX ORDER — SERTRALINE HYDROCHLORIDE 100 MG/1
TABLET, FILM COATED ORAL
Qty: 90 TABLET | Refills: 0 | Status: SHIPPED | OUTPATIENT
Start: 2024-12-23

## 2025-01-08 ASSESSMENT — SLEEP AND FATIGUE QUESTIONNAIRES
HOW LIKELY ARE YOU TO NOD OFF OR FALL ASLEEP IN A CAR, WHILE STOPPED FOR A FEW MINUTES IN TRAFFIC: WOULD NEVER DOZE
ESS TOTAL SCORE: 9
HAS YOUR RELATIONSHIP WITH FAMILY, FRIENDS OR WORK COLLEAGUES BEEN AFFECTED BECAUSE YOU ARE SLEEPY OR TIRED: NO
DO YOU HAVE DIFFICULTY VISITING YOUR FAMILY OR FRIENDS IN THEIR HOME BECAUSE YOU BECOME SLEEPY OR TIRED: NO
DO YOU HAVE DIFFICULTY WATCHING A MOVIE OR VIDEO BECAUSE YOU BECOME SLEEPY OR TIRED: YES, MODERATE
HOW LIKELY ARE YOU TO NOD OFF OR FALL ASLEEP WHILE SITTING AND TALKING TO SOMEONE: WOULD NEVER DOZE
DO YOU GENERALLY HAVE DIFFICULTY REMEMBERING THINGS BECAUSE YOU ARE SLEEPY OR TIRED: YES, MODERATE
HOW LIKELY ARE YOU TO NOD OFF OR FALL ASLEEP WHILE LYING DOWN TO REST IN THE AFTERNOON WHEN CIRCUMSTANCES PERMIT: MODERATE CHANCE OF DOZING
HAS YOUR MOOD BEEN AFFECTED BECAUSE YOU ARE SLEEPY OR TIRED: NO
FOSQ SCORE: 15.5
HOW LIKELY ARE YOU TO NOD OFF OR FALL ASLEEP WHILE SITTING QUIETLY AFTER LUNCH WITHOUT ALCOHOL: WOULD NEVER DOZE
DO YOU HAVE DIFFICULTY OPERATING A MOTOR VEHICLE FOR LONG DISTANCES (GREATER THAN 100 MILES) BECAUSE YOU BECOME SLEEPY: YES, A LITTLE
DO YOU HAVE DIFFICULTY CONCENTRATING ON THE THINGS YOU DO BECAUSE YOU ARE SLEEPY OR TIRED: YES, MODERATE
HOW LIKELY ARE YOU TO NOD OFF OR FALL ASLEEP WHILE SITTING AND TALKING TO SOMEONE: WOULD NEVER DOZE
HOW LIKELY ARE YOU TO NOD OFF OR FALL ASLEEP WHILE WATCHING TV: MODERATE CHANCE OF DOZING
HOW LIKELY ARE YOU TO NOD OFF OR FALL ASLEEP WHILE WATCHING TV: MODERATE CHANCE OF DOZING
HOW LIKELY ARE YOU TO NOD OFF OR FALL ASLEEP WHEN YOU ARE A PASSENGER IN A CAR FOR AN HOUR WITHOUT A BREAK: SLIGHT CHANCE OF DOZING
DO YOU HAVE DIFFICULTY OPERATING A MOTOR VEHICLE FOR SHORT DISTANCES (LESS THAN 100 MILES) BECAUSE YOU BECOME SLEEPY: NO
HOW LIKELY ARE YOU TO NOD OFF OR FALL ASLEEP WHEN YOU ARE A PASSENGER IN A CAR FOR AN HOUR WITHOUT A BREAK: SLIGHT CHANCE OF DOZING
DO YOU HAVE DIFFICULTY BEING AS ACTIVE AS YOU WANT TO BE IN THE MORNING BECAUSE YOU ARE SLEEPY OR TIRED: NO
DO YOU HAVE DIFFICULTY BEING AS ACTIVE AS YOU WANT TO BE IN THE EVENING BECAUSE YOU ARE SLEEPY OR TIRED: YES, MODERATE
HOW LIKELY ARE YOU TO NOD OFF OR FALL ASLEEP IN A CAR, WHILE STOPPED FOR A FEW MINUTES IN TRAFFIC: WOULD NEVER DOZE
HOW LIKELY ARE YOU TO NOD OFF OR FALL ASLEEP WHILE SITTING INACTIVE IN A PUBLIC PLACE: MODERATE CHANCE OF DOZING
HOW LIKELY ARE YOU TO NOD OFF OR FALL ASLEEP WHILE SITTING QUIETLY AFTER LUNCH WITHOUT ALCOHOL: WOULD NEVER DOZE
HOW LIKELY ARE YOU TO NOD OFF OR FALL ASLEEP WHILE LYING DOWN TO REST IN THE AFTERNOON WHEN CIRCUMSTANCES PERMIT: MODERATE CHANCE OF DOZING
HOW LIKELY ARE YOU TO NOD OFF OR FALL ASLEEP WHILE SITTING AND READING: MODERATE CHANCE OF DOZING
HOW LIKELY ARE YOU TO NOD OFF OR FALL ASLEEP WHILE SITTING INACTIVE IN A PUBLIC PLACE: MODERATE CHANCE OF DOZING
HOW LIKELY ARE YOU TO NOD OFF OR FALL ASLEEP WHILE SITTING AND READING: MODERATE CHANCE OF DOZING

## 2025-01-09 ENCOUNTER — TELEMEDICINE (OUTPATIENT)
Age: 80
End: 2025-01-09

## 2025-01-09 ENCOUNTER — TELEPHONE (OUTPATIENT)
Age: 80
End: 2025-01-09

## 2025-01-09 DIAGNOSIS — G47.33 OSA (OBSTRUCTIVE SLEEP APNEA): Primary | ICD-10-CM

## 2025-01-09 RX ORDER — TIRZEPATIDE 7.5 MG/.5ML
INJECTION, SOLUTION SUBCUTANEOUS
COMMUNITY
Start: 2024-11-15

## 2025-01-09 RX ORDER — CEFDINIR 300 MG/1
CAPSULE ORAL
COMMUNITY
Start: 2024-10-19

## 2025-01-09 NOTE — TELEPHONE ENCOUNTER
Called patient to office to schedule follow-up titration sleep study. Patient scheduled for 03/26/2025. PAP supply order sent to [facility].

## 2025-01-09 NOTE — PROGRESS NOTES
Chief Complaint   Patient presents with    Sleep Problem     Follow up LOV 5/19/2022. [place]ent [place]. Send link to [phone]      
11/20/24: 77.5 kg (170 lb 12.8 oz).        Physical Exam completed by visual and auditory observation of patient with verbal input from patient.    General:   Alert, oriented, not in acute distress   Eyes:  Anicteric Sclerae; no obvious strabismus   Nose:  No obvious nasal septum deviation    Neck:   Midline trachea, no visible mass   Chest/Lungs:  Respiratory effort normal, no visualized signs of difficulty breathing or respiratory distress   CVS:  No JVD   Extremities:  No obvious rashes noted on face, neck, or hands   Neuro:  No facial asymmetry, no focal deficits; no obvious tremor    Psych:  Normal affect,  normal countenance       Nik MCMANUS Carmelo, was evaluated through a synchronous (real-time) audio-video encounter. The patient (or guardian if applicable) is aware that this is a billable service, which includes applicable co-pays. This Virtual Visit was conducted with patient's (and/or legal guardian's) consent. Patient identification was verified, and a caregiver was present when appropriate.   The patient was located at Home: [address] [place] [id number]  Provider was located at Home (Appt Dept State): [place]  Confirm you are appropriately licensed, registered, or certified to deliver care in the state where the patient is located as indicated above. If you are not or unsure, please re-schedule the visit: Yes, I confirm.     Office visit exceeded 24 minutes with counseling and direction of care taking up more than 50% of the allotted time.    An electronic signature was used to authenticate this note.      MYLES COLLINS MD, FAASM  Electronically signed. 01/09/25

## 2025-03-20 RX ORDER — SERTRALINE HYDROCHLORIDE 100 MG/1
100 TABLET, FILM COATED ORAL EVERY EVENING
Qty: 90 TABLET | Refills: 0 | Status: SHIPPED | OUTPATIENT
Start: 2025-03-20

## 2025-03-22 ENCOUNTER — HOSPITAL ENCOUNTER (EMERGENCY)
Facility: HOSPITAL | Age: 80
Discharge: HOME | End: 2025-03-22
Attending: STUDENT IN AN ORGANIZED HEALTH CARE EDUCATION/TRAINING PROGRAM
Payer: MEDICARE

## 2025-03-22 VITALS
RESPIRATION RATE: 16 BRPM | TEMPERATURE: 97 F | SYSTOLIC BLOOD PRESSURE: 133 MMHG | WEIGHT: 176.37 LBS | HEART RATE: 54 BPM | DIASTOLIC BLOOD PRESSURE: 63 MMHG | BODY MASS INDEX: 32.46 KG/M2 | HEIGHT: 62 IN | OXYGEN SATURATION: 99 %

## 2025-03-22 DIAGNOSIS — R19.7 DIARRHEA OF PRESUMED INFECTIOUS ORIGIN: Primary | ICD-10-CM

## 2025-03-22 DIAGNOSIS — E86.0 DEHYDRATION: ICD-10-CM

## 2025-03-22 PROCEDURE — 96361 HYDRATE IV INFUSION ADD-ON: CPT

## 2025-03-22 PROCEDURE — 2580000003 HC RX 258: Performed by: STUDENT IN AN ORGANIZED HEALTH CARE EDUCATION/TRAINING PROGRAM

## 2025-03-22 PROCEDURE — 99284 EMERGENCY DEPT VISIT MOD MDM: CPT

## 2025-03-22 PROCEDURE — 96360 HYDRATION IV INFUSION INIT: CPT

## 2025-03-22 RX ORDER — 0.9 % SODIUM CHLORIDE 0.9 %
1000 INTRAVENOUS SOLUTION INTRAVENOUS ONCE
Status: COMPLETED | OUTPATIENT
Start: 2025-03-22 | End: 2025-03-22

## 2025-03-22 RX ADMIN — SODIUM CHLORIDE 1000 ML: 0.9 INJECTION, SOLUTION INTRAVENOUS at 15:40

## 2025-03-22 ASSESSMENT — ENCOUNTER SYMPTOMS: DIARRHEA: 1

## 2025-03-22 NOTE — ED NOTES
Patient stable at time of discharge. Reviewed discharge instructions and home care with patient. Allowed time for questions. Patient verbalized understanding. Ambulatory out of department with steady gait accompanied by spouse.

## 2025-03-22 NOTE — ED TRIAGE NOTES
Patient arrives ambulatory with  with c/o diarrhea for 3 days and chills. Patient was seen at [facility] today and they noted a low BP 99/63. PF recommended IV fluids but they could not find a vein and told patient to go to the ER. Patient denies abdominal pain or dizziness.

## 2025-03-22 NOTE — ED PROVIDER NOTES
[facility] DEPARTMENT  EMERGENCY DEPARTMENT ENCOUNTER      Pt Name: Nik Rhodes  MRN: [id number]  Birthdate 1945  Date of evaluation: 3/22/2025  Provider: Joesph Veras DO    CHIEF COMPLAINT       Chief Complaint   Patient presents with    Diarrhea    Chills         HISTORY OF PRESENT ILLNESS   (Location/Symptom, Timing/Onset, Context/Setting, Quality, Duration, Modifying Factors, Severity)  Note limiting factors.   79-year-old female history of diabetes, hypertension, hyperlipidemia presenting today with diarrhea and concerns for dehydration.  Has had 3 days of diarrhea, multiple episodes per day of nonbloody watery stools without vomiting or abdominal pain/fevers.  Has had some chills.  No ill contacts, suspicious food consumption no recent antibiotic use.  No recent travel.  [facility] and they found her to have orthostatic hypotension and recommended she have an IV for fluids.  They were unable to obtain IV access so they sent her here for the fluids.  They did obtain labs while there.    Labs from [facility]:  Hemoglobin 14.3, white blood cell 9.3  Glucose 142  BUN 32  Creatinine 1.2  CO2 21  Potassium 4.1  Sodium 139                Review of External Medical Records:     Nursing Notes were reviewed.    REVIEW OF SYSTEMS    (2-9 systems for level 4, 10 or more for level 5)     Review of Systems   Constitutional:  Positive for chills.   Gastrointestinal:  Positive for diarrhea.       Except as noted above the remainder of the review of systems was reviewed and negative.       PAST MEDICAL HISTORY     Past Medical History:   Diagnosis Date    Arthritis     CAD (coronary artery disease) 2006    STENT PLACED>PLAQUE BROKE OFF--HAD \"MINOR MI\"    Deviated septum 12/20/13    HAS TROUBLE BREATHING THROUGH LEFT SIDE; CT SHOWED POLYP    Diabetes (HCC)     Heart disease     Hypertension     Incontinence     Nasal polyp     Unspecified sleep apnea 12/20/2013    biphasic

## 2025-04-01 ENCOUNTER — PATIENT MESSAGE (OUTPATIENT)
Facility: CLINIC | Age: 80
End: 2025-04-01

## 2025-05-15 RX ORDER — ROSUVASTATIN CALCIUM 20 MG/1
20 TABLET, COATED ORAL
Qty: 90 TABLET | Refills: 3 | Status: SHIPPED | OUTPATIENT
Start: 2025-05-15

## 2025-05-15 NOTE — TELEPHONE ENCOUNTER
Requested Prescriptions     Signed Prescriptions Disp Refills    rosuvastatin (CRESTOR) 20 MG tablet 90 tablet 3     Sig: TAKE 1 TABLET BY MOUTH EVERYDAY AT BEDTIME     Authorizing Provider: SUNNY WALKER     Ordering User: JEANNIE SHAFER     Future Appointments   Date Time Provider Department Center   6/13/2025 11:00 AM Pepper Summers PSYD [facility] BS AMB   6/30/2025  2:00 PM NEUROPSYCH TESTING [facility] BRIANNA BS AMB   7/14/2025  1:30 PM Pepper Summers PSYD [facility] BS AMB   8/5/2025  9:00 AM Torri Solomon MD IM [facility]   9/10/2025 11:40 AM Sunny Walker MD [facility] BS AMB

## 2025-06-13 ENCOUNTER — OFFICE VISIT (OUTPATIENT)
Age: 80
End: 2025-06-13
Payer: MEDICARE

## 2025-06-13 DIAGNOSIS — R41.3 MEMORY LOSS: Primary | ICD-10-CM

## 2025-06-13 DIAGNOSIS — G31.84 COGNITIVE IMPAIRMENT, MILD, SO STATED: ICD-10-CM

## 2025-06-13 PROCEDURE — 90791 PSYCH DIAGNOSTIC EVALUATION: CPT | Performed by: STUDENT IN AN ORGANIZED HEALTH CARE EDUCATION/TRAINING PROGRAM

## 2025-06-13 NOTE — PROGRESS NOTES
Sometimes does not double-check that she has taken all the pills out of the cup. Ms. Rhodes resides with her  in their own home. There were no reported safety concerns.     Current Emotional/Behavioral Functioning: Ms. Rhodes reported to being a longstanding \"worrier.\" She acknowledged that she has likely had anxiety but was never treated for it and this sometimes impacted her daily life. Lately, her  feels that most of her worries have been about potentially being a burden to him. Ms. Rhodes has reportedly been taking sertraline for several years now; she and her  are not sure whether this is actually helping her mood in any way. Ms. Rhodes did not report to any valentina behavioral changes, current thoughts of suicide/self-harm, or perceptual disturbances.     Physical Health Symptoms & Behaviors: Diagnosed with GALLITO, now compliant with nightly BiPAP use. However, she reported that her sleep is not good. She is waking up several times per night;  tries to get her back to bed but she states that she is too worried to do so. He reportedly cannot get her to sleep at a normal time, she tries to go to bed around 2-3 am. As a result, she is very fatigued during the day and takes naps. Ms. Rhodes reported that she does water aerobics 3x/week; she has not been able to go for walks as much as before due to chronic back aches. This limits her ability to walk, but she is not unsteady on her feet and has not had any falls. Sensory functions are unchanged.     RELEVANT HISTORY     MEDICAL AND MENTAL HEALTH HISTORY:  Medical History:   Obstructive sleep apnea, non-compliant with CPAP. External referral to Sleep Medicine: Compliant on BiPAP  Diabetes, HTN, CAD = all controlled at this time.   Migraine: no  Seizure: no  Stroke: no  Traumatic brain injury (TBI): no  Past Medical History:   Diagnosis Date    Arthritis     CAD (coronary artery disease) 2006    STENT PLACED>PLAQUE BROKE OFF--HAD \"MINOR MI\"

## 2025-06-13 NOTE — PATIENT INSTRUCTIONS
Thank you for choosing us for your neuropsychological evaluation. We will examine your overall cognitive functioning, including areas such as memory, attention, concentration, language, and problem solving.   You were seen for an initial visit by Dr. Pepper Summers (Neuropsychologist) today. Please refer to the dates listed under \"What's Next\" for your scheduled Procedure (testing) and Follow-Up appointments.    Reminders for your testing appointment:   If you wear glasses/contacts and/or hearing assistive devices, please be sure to bring them with you to your testing appointment.   You will be working with my Psychometrist, Maren, on the day of testing. She is specially trained to administer these cognitive tests to you.   Please prepare accordingly, as the duration of testing may take a few hours to complete.     To reschedule or cancel your appointment, please call [phone].   In case of an emergency, call [phone] or report to the nearest emergency department.  It has been our pleasure to work with you, and we look forward to speaking with you soon.

## 2025-06-16 RX ORDER — SERTRALINE HYDROCHLORIDE 100 MG/1
100 TABLET, FILM COATED ORAL EVERY EVENING
Qty: 90 TABLET | Refills: 0 | Status: SHIPPED | OUTPATIENT
Start: 2025-06-16

## 2025-06-16 RX ORDER — SERTRALINE HYDROCHLORIDE 100 MG/1
100 TABLET, FILM COATED ORAL EVERY EVENING
Qty: 90 TABLET | Refills: 0 | OUTPATIENT
Start: 2025-06-16

## 2025-06-24 ENCOUNTER — TELEPHONE (OUTPATIENT)
Age: 80
End: 2025-06-24

## 2025-06-24 NOTE — TELEPHONE ENCOUNTER
Pt daughter would like Dr. BARCLAY to be aware of mother still driving. Daughter believes that she should not be driving and if this can be address during visit.        Елена bhatti  [phone]   Not on patient release form

## 2025-06-26 NOTE — TELEPHONE ENCOUNTER
Returned call and let her know that her concerns were conveyed to Dr. Summers.  Also, recommended they add her to HIPAA.  She thanked me for calling her back. She is hoping they will add her when mom comes in for testing.

## 2025-06-30 ENCOUNTER — PROCEDURE VISIT (OUTPATIENT)
Age: 80
End: 2025-06-30
Payer: MEDICARE

## 2025-06-30 DIAGNOSIS — G31.84 AMNESTIC MCI (MILD COGNITIVE IMPAIRMENT WITH MEMORY LOSS): Primary | ICD-10-CM

## 2025-06-30 PROCEDURE — 96133 NRPSYC TST EVAL PHYS/QHP EA: CPT | Performed by: STUDENT IN AN ORGANIZED HEALTH CARE EDUCATION/TRAINING PROGRAM

## 2025-06-30 PROCEDURE — 96139 PSYCL/NRPSYC TST TECH EA: CPT | Performed by: STUDENT IN AN ORGANIZED HEALTH CARE EDUCATION/TRAINING PROGRAM

## 2025-06-30 PROCEDURE — 96132 NRPSYC TST EVAL PHYS/QHP 1ST: CPT | Performed by: STUDENT IN AN ORGANIZED HEALTH CARE EDUCATION/TRAINING PROGRAM

## 2025-06-30 PROCEDURE — 96138 PSYCL/NRPSYC TECH 1ST: CPT | Performed by: STUDENT IN AN ORGANIZED HEALTH CARE EDUCATION/TRAINING PROGRAM

## 2025-07-10 ENCOUNTER — TELEPHONE (OUTPATIENT)
Facility: CLINIC | Age: 80
End: 2025-07-10

## 2025-07-10 NOTE — TELEPHONE ENCOUNTER
----- Message from Valdo FOUNTAIN sent at 7/9/2025  3:09 PM EDT -----  Regarding: E[facility] Appointment Request  E[facility] Appointment Request    Patient needs appointment for E[facility] Appointment Type: Annual Visit.    Patient Requested Dates(s):August 14 2025  Patient Requested Time:morning 10 am   Provider Name:Torri Solomon MD    Reason for Appointment Request: Established Patient - No appointments available during search  --------------------------------------------------------------------------------------------------------------------------    Relationship to Patient: Spouse/Partner vignesh      Call Back Information: OK to leave message on voicemail  Preferred Call Back Number: Phone [phone]

## 2025-07-14 ENCOUNTER — OFFICE VISIT (OUTPATIENT)
Age: 80
End: 2025-07-14
Payer: MEDICARE

## 2025-07-14 DIAGNOSIS — G31.84 AMNESTIC MCI (MILD COGNITIVE IMPAIRMENT WITH MEMORY LOSS): Primary | ICD-10-CM

## 2025-07-14 PROCEDURE — 96132 NRPSYC TST EVAL PHYS/QHP 1ST: CPT | Performed by: STUDENT IN AN ORGANIZED HEALTH CARE EDUCATION/TRAINING PROGRAM

## 2025-07-14 NOTE — PROGRESS NOTES
exercises to optimize functioning - discussed vascular risk factors.   I am pleased to hear that she continues to participate in water aerobics.   Continue to use BiPAP for sleep apnea.   Re-evaluate 12-18 months for continued tracking - scheduling will contact patient to get them in the books for next year.     I answered any questions in detail, reminded her that a copy of her report could be accessed via crobo, and encouraged her and her family to contact us with any further questions or concerns moving forward.     MENTAL STATUS:  Arrival: On time and accompanied by her .  General appearance: Appropriately dressed and well-groomed.   Orientation: Oriented to time, place, and situation.   Level of consciousness: Alert, attentive  Ambulation/Gait: Ambulated independently; gait was unremarkable.   Motor: WNL.   Sensory: Vision and hearing were adequate for the purposes of the evaluation.  Speech: Normal for rate, tone, prosody.   Language comprehension: Intact.   Thought processes/Insight: Logical thought processes; adequate insight.  Mood and Affect: Euthymic mood; normal range of affect.   Current suicidality/homicidality: Did not endorse.   Rapport: Adequately established.     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

## 2025-07-18 NOTE — PROGRESS NOTES
NEUROLOGY  NEW PATIENT EVALUATION/CONSULTATION         PATIENT ID:   Nik Rhodes  1945  80 y.o. female  [id number]     Reason for Consultation: Memory changes        Previous records (physician notes, laboratory reports, and radiology reports) and imaging studies were reviewed and summarized. My recommendations will be communicated back to the patient's physician(s) via electronic medical record and/or by US mail.     Chief Complaint   Patient presents with    New Patient     New Patient  Memory   Ref by Dr. Summers         Referred by: Dr Summers, Neuropsychologist      History of Present Illness:   She is here today with her  who helps provide history. Pt was recommended to abstain from driving after Neuropsychological evaluation in June 2025 and pt/ desire to resume driving short distances as she did well with this in the past.  reports pt's sister has been diagnosed with dementia  in her 80's, otherwise, no known family history of dementia. Pt is taking Sertraline 100 mg and denies depression, but does c/o anxiety since childhood. In conversation with me, she repeated a statement about 3-5 minutes later without recalling,  states this is not unusual.     Onset and progression: 2019, with progression     Neuropsychiatric symptoms                 Problems with judgment: No              Reduced interest in hobbies/activities: Yes              Repeats questions, stories, or statements: Yes              Trouble recalling people's names: Yes              Trouble learning how to use a tool or appliance: Yes              Forgetting the correct month or year: No              Difficulty handling financial affairs (bill-paying, taxes): Yes              Difficulty remembering appointments: No     Memory: Short term memory deficit? Yes per , wife disagrees  Language: (word finding)? No  Change in personality: Yes, more anxious  Socially inappropriate behavior: No  Change in

## 2025-07-28 ENCOUNTER — OFFICE VISIT (OUTPATIENT)
Age: 80
End: 2025-07-28
Payer: MEDICARE

## 2025-07-28 VITALS
OXYGEN SATURATION: 94 % | HEART RATE: 72 BPM | HEIGHT: 62 IN | DIASTOLIC BLOOD PRESSURE: 62 MMHG | BODY MASS INDEX: 31.65 KG/M2 | WEIGHT: 172 LBS | SYSTOLIC BLOOD PRESSURE: 102 MMHG | RESPIRATION RATE: 16 BRPM

## 2025-07-28 DIAGNOSIS — G31.84 AMNESTIC MCI (MILD COGNITIVE IMPAIRMENT WITH MEMORY LOSS): Primary | ICD-10-CM

## 2025-07-28 PROCEDURE — 1090F PRES/ABSN URINE INCON ASSESS: CPT

## 2025-07-28 PROCEDURE — 3078F DIAST BP <80 MM HG: CPT

## 2025-07-28 PROCEDURE — G8427 DOCREV CUR MEDS BY ELIG CLIN: HCPCS

## 2025-07-28 PROCEDURE — 1160F RVW MEDS BY RX/DR IN RCRD: CPT

## 2025-07-28 PROCEDURE — 1159F MED LIST DOCD IN RCRD: CPT

## 2025-07-28 PROCEDURE — 99204 OFFICE O/P NEW MOD 45 MIN: CPT

## 2025-07-28 PROCEDURE — G8399 PT W/DXA RESULTS DOCUMENT: HCPCS

## 2025-07-28 PROCEDURE — 3074F SYST BP LT 130 MM HG: CPT

## 2025-07-28 PROCEDURE — G8417 CALC BMI ABV UP PARAM F/U: HCPCS

## 2025-07-28 PROCEDURE — 1123F ACP DISCUSS/DSCN MKR DOCD: CPT

## 2025-07-28 PROCEDURE — 1036F TOBACCO NON-USER: CPT

## 2025-07-28 ASSESSMENT — PATIENT HEALTH QUESTIONNAIRE - PHQ9
SUM OF ALL RESPONSES TO PHQ QUESTIONS 1-9: 0
1. LITTLE INTEREST OR PLEASURE IN DOING THINGS: NOT AT ALL
2. FEELING DOWN, DEPRESSED OR HOPELESS: NOT AT ALL
SUM OF ALL RESPONSES TO PHQ QUESTIONS 1-9: 0

## 2025-07-28 ASSESSMENT — VISUAL ACUITY: VA_NORMAL: 1

## 2025-07-28 NOTE — PATIENT INSTRUCTIONS
[facility]  Address: [address] [place] [address]  Phone: [phone]    [facility] [place]   Address: [address], [place] [address]  Phone: [phone]    [facility] (In person/virtual)   [place] Location  Address: [address], [place] [address]  Phone: [phone]    [place] Location  Address: [address] [place] [address]  Phone: [phone]    [place] Location  Address: [address], [place] [address]  Phone: [phone]    [place] Location  Address: [address], [place] [address]  Phone: [phone]    [place] Location  Address: [address], [place] [address]  Phone: [phone]    [facility]  Address: [address], [place] [address]  Phone: [phone]    [facility]  [place]  [address] [place]  Phone: [phone]    [facility]  [address]  [place], [place]  Phone: [phone]    [facility]  Address: [address] [place] [address]  Phone: [phone]    [facility]  Address: [address], [place] [address]  Phone: [phone]    [facility]  Address: [address], [place] [address]  Phone: [phone]    [facility], [facility]  Address: [address], [place] [address]  Phone: [phone]    [facility], [facility]  Address: [address] [place] [address]  Phone: [phone]

## 2025-08-04 ENCOUNTER — CLINICAL DOCUMENTATION (OUTPATIENT)
Age: 80
End: 2025-08-04

## 2025-08-10 DIAGNOSIS — I10 ESSENTIAL (PRIMARY) HYPERTENSION: ICD-10-CM

## 2025-08-11 RX ORDER — METOPROLOL TARTRATE 25 MG/1
25 TABLET, FILM COATED ORAL 2 TIMES DAILY
Qty: 180 TABLET | Refills: 3 | Status: SHIPPED | OUTPATIENT
Start: 2025-08-11

## 2025-08-12 ENCOUNTER — HOSPITAL ENCOUNTER (OUTPATIENT)
Facility: HOSPITAL | Age: 80
Discharge: HOME | End: 2025-08-15
Payer: MEDICARE

## 2025-08-12 DIAGNOSIS — G31.84 AMNESTIC MCI (MILD COGNITIVE IMPAIRMENT WITH MEMORY LOSS): ICD-10-CM

## 2025-08-12 PROCEDURE — 70551 MRI BRAIN STEM W/O DYE: CPT

## 2025-08-14 ENCOUNTER — RESULTS FOLLOW-UP (OUTPATIENT)
Age: 80
End: 2025-08-14

## 2025-08-14 DIAGNOSIS — I10 ESSENTIAL (PRIMARY) HYPERTENSION: ICD-10-CM

## 2025-08-14 RX ORDER — LOSARTAN POTASSIUM AND HYDROCHLOROTHIAZIDE 12.5; 5 MG/1; MG/1
1 TABLET ORAL 2 TIMES DAILY
Qty: 180 TABLET | Refills: 3 | Status: SHIPPED | OUTPATIENT
Start: 2025-08-14

## 2025-08-28 ENCOUNTER — HOSPITAL ENCOUNTER (OUTPATIENT)
Facility: HOSPITAL | Age: 80
Discharge: HOME | End: 2025-08-31
Attending: INTERNAL MEDICINE
Payer: MEDICARE

## 2025-08-28 DIAGNOSIS — Z12.31 VISIT FOR SCREENING MAMMOGRAM: ICD-10-CM

## 2025-08-28 PROCEDURE — 77063 BREAST TOMOSYNTHESIS BI: CPT

## 2025-08-28 SDOH — ECONOMIC STABILITY: INCOME INSECURITY: IN THE LAST 12 MONTHS, WAS THERE A TIME WHEN YOU WERE NOT ABLE TO PAY THE MORTGAGE OR RENT ON TIME?: NO

## 2025-08-28 SDOH — ECONOMIC STABILITY: TRANSPORTATION INSECURITY
IN THE PAST 12 MONTHS, HAS THE LACK OF TRANSPORTATION KEPT YOU FROM MEDICAL APPOINTMENTS OR FROM GETTING MEDICATIONS?: NO

## 2025-08-28 SDOH — ECONOMIC STABILITY: FOOD INSECURITY: WITHIN THE PAST 12 MONTHS, YOU WORRIED THAT YOUR FOOD WOULD RUN OUT BEFORE YOU GOT MONEY TO BUY MORE.: NEVER TRUE

## 2025-08-28 SDOH — ECONOMIC STABILITY: FOOD INSECURITY: WITHIN THE PAST 12 MONTHS, THE FOOD YOU BOUGHT JUST DIDN'T LAST AND YOU DIDN'T HAVE MONEY TO GET MORE.: NEVER TRUE

## 2025-08-28 SDOH — HEALTH STABILITY: PHYSICAL HEALTH: ON AVERAGE, HOW MANY MINUTES DO YOU ENGAGE IN EXERCISE AT THIS LEVEL?: 60 MIN

## 2025-08-28 SDOH — HEALTH STABILITY: PHYSICAL HEALTH: ON AVERAGE, HOW MANY DAYS PER WEEK DO YOU ENGAGE IN MODERATE TO STRENUOUS EXERCISE (LIKE A BRISK WALK)?: 3 DAYS

## 2025-08-28 ASSESSMENT — LIFESTYLE VARIABLES
HOW MANY STANDARD DRINKS CONTAINING ALCOHOL DO YOU HAVE ON A TYPICAL DAY: PATIENT DOES NOT DRINK
HOW MANY STANDARD DRINKS CONTAINING ALCOHOL DO YOU HAVE ON A TYPICAL DAY: 0
HOW OFTEN DO YOU HAVE SIX OR MORE DRINKS ON ONE OCCASION: 1
HOW OFTEN DO YOU HAVE A DRINK CONTAINING ALCOHOL: 1
HOW OFTEN DO YOU HAVE A DRINK CONTAINING ALCOHOL: NEVER

## 2025-08-28 ASSESSMENT — PATIENT HEALTH QUESTIONNAIRE - PHQ9
SUM OF ALL RESPONSES TO PHQ QUESTIONS 1-9: 2
SUM OF ALL RESPONSES TO PHQ QUESTIONS 1-9: 2
2. FEELING DOWN, DEPRESSED OR HOPELESS: MORE THAN HALF THE DAYS
1. LITTLE INTEREST OR PLEASURE IN DOING THINGS: NOT AT ALL
SUM OF ALL RESPONSES TO PHQ QUESTIONS 1-9: 2
SUM OF ALL RESPONSES TO PHQ QUESTIONS 1-9: 2

## 2025-09-04 ENCOUNTER — OFFICE VISIT (OUTPATIENT)
Facility: CLINIC | Age: 80
End: 2025-09-04
Payer: MEDICARE

## 2025-09-04 ENCOUNTER — TELEPHONE (OUTPATIENT)
Age: 80
End: 2025-09-04

## 2025-09-04 VITALS
DIASTOLIC BLOOD PRESSURE: 67 MMHG | RESPIRATION RATE: 14 BRPM | WEIGHT: 173.2 LBS | BODY MASS INDEX: 31.87 KG/M2 | OXYGEN SATURATION: 93 % | HEART RATE: 59 BPM | HEIGHT: 62 IN | SYSTOLIC BLOOD PRESSURE: 115 MMHG

## 2025-09-04 DIAGNOSIS — Z00.00 MEDICARE ANNUAL WELLNESS VISIT, SUBSEQUENT: Primary | ICD-10-CM

## 2025-09-04 DIAGNOSIS — G47.33 OBSTRUCTIVE SLEEP APNEA SYNDROME: ICD-10-CM

## 2025-09-04 DIAGNOSIS — F33.1 MAJOR DEPRESSIVE DISORDER, RECURRENT, MODERATE (HCC): ICD-10-CM

## 2025-09-04 DIAGNOSIS — I25.10 CORONARY ARTERY DISEASE INVOLVING NATIVE HEART WITHOUT ANGINA PECTORIS, UNSPECIFIED VESSEL OR LESION TYPE: ICD-10-CM

## 2025-09-04 DIAGNOSIS — G31.84 MCI (MILD COGNITIVE IMPAIRMENT): ICD-10-CM

## 2025-09-04 DIAGNOSIS — E11.21 TYPE 2 DIABETES MELLITUS WITH DIABETIC NEPHROPATHY, WITHOUT LONG-TERM CURRENT USE OF INSULIN (HCC): ICD-10-CM

## 2025-09-04 DIAGNOSIS — N18.31 CHRONIC KIDNEY DISEASE, STAGE 3A (HCC): ICD-10-CM

## 2025-09-04 DIAGNOSIS — I10 PRIMARY HYPERTENSION: ICD-10-CM

## 2025-09-04 PROCEDURE — 99214 OFFICE O/P EST MOD 30 MIN: CPT | Performed by: INTERNAL MEDICINE

## 2025-09-04 PROCEDURE — 1159F MED LIST DOCD IN RCRD: CPT | Performed by: INTERNAL MEDICINE

## 2025-09-04 PROCEDURE — G8417 CALC BMI ABV UP PARAM F/U: HCPCS | Performed by: INTERNAL MEDICINE

## 2025-09-04 PROCEDURE — 1123F ACP DISCUSS/DSCN MKR DOCD: CPT | Performed by: INTERNAL MEDICINE

## 2025-09-04 PROCEDURE — 3078F DIAST BP <80 MM HG: CPT | Performed by: INTERNAL MEDICINE

## 2025-09-04 PROCEDURE — 1090F PRES/ABSN URINE INCON ASSESS: CPT | Performed by: INTERNAL MEDICINE

## 2025-09-04 PROCEDURE — PBSHW TDAP, BOOSTRIX, (AGE 10 YRS+), IM: Performed by: INTERNAL MEDICINE

## 2025-09-04 PROCEDURE — G8427 DOCREV CUR MEDS BY ELIG CLIN: HCPCS | Performed by: INTERNAL MEDICINE

## 2025-09-04 PROCEDURE — 3074F SYST BP LT 130 MM HG: CPT | Performed by: INTERNAL MEDICINE

## 2025-09-04 PROCEDURE — 1036F TOBACCO NON-USER: CPT | Performed by: INTERNAL MEDICINE

## 2025-09-04 PROCEDURE — 90715 TDAP VACCINE 7 YRS/> IM: CPT | Performed by: INTERNAL MEDICINE

## 2025-09-04 PROCEDURE — G8399 PT W/DXA RESULTS DOCUMENT: HCPCS | Performed by: INTERNAL MEDICINE

## 2025-09-04 PROCEDURE — G0439 PPPS, SUBSEQ VISIT: HCPCS | Performed by: INTERNAL MEDICINE

## 2025-09-04 RX ORDER — SERTRALINE HYDROCHLORIDE 100 MG/1
100 TABLET, FILM COATED ORAL EVERY EVENING
Qty: 90 TABLET | Refills: 3 | Status: SHIPPED | OUTPATIENT
Start: 2025-09-04